# Patient Record
Sex: MALE | Race: WHITE | NOT HISPANIC OR LATINO | Employment: FULL TIME | URBAN - METROPOLITAN AREA
[De-identification: names, ages, dates, MRNs, and addresses within clinical notes are randomized per-mention and may not be internally consistent; named-entity substitution may affect disease eponyms.]

---

## 2017-02-23 ENCOUNTER — HOME HEALTH ADMISSION (OUTPATIENT)
Dept: HOME HEALTH SERVICES | Facility: HOME HEALTHCARE | Age: 47
End: 2017-02-23
Payer: MEDICAID

## 2017-02-24 ENCOUNTER — HOME CARE VISIT (OUTPATIENT)
Dept: HOME HEALTH SERVICES | Facility: HOME HEALTHCARE | Age: 47
End: 2017-02-24

## 2017-02-26 ENCOUNTER — HOME CARE VISIT (OUTPATIENT)
Dept: HOME HEALTH SERVICES | Facility: HOME HEALTHCARE | Age: 47
End: 2017-02-26

## 2018-03-26 ENCOUNTER — APPOINTMENT (OUTPATIENT)
Dept: RADIOLOGY | Facility: MEDICAL CENTER | Age: 48
End: 2018-03-26
Attending: EMERGENCY MEDICINE
Payer: MEDICAID

## 2018-03-26 ENCOUNTER — HOSPITAL ENCOUNTER (EMERGENCY)
Facility: MEDICAL CENTER | Age: 48
End: 2018-03-26
Attending: EMERGENCY MEDICINE
Payer: MEDICAID

## 2018-03-26 VITALS
RESPIRATION RATE: 18 BRPM | SYSTOLIC BLOOD PRESSURE: 131 MMHG | HEIGHT: 73 IN | OXYGEN SATURATION: 96 % | HEART RATE: 83 BPM | WEIGHT: 183.2 LBS | BODY MASS INDEX: 24.28 KG/M2 | DIASTOLIC BLOOD PRESSURE: 81 MMHG | TEMPERATURE: 97.3 F

## 2018-03-26 DIAGNOSIS — M62.830 MUSCLE SPASM OF BACK: ICD-10-CM

## 2018-03-26 LAB
ALBUMIN SERPL BCP-MCNC: 3.8 G/DL (ref 3.2–4.9)
ALBUMIN/GLOB SERPL: 1.4 G/DL
ALP SERPL-CCNC: 120 U/L (ref 30–99)
ALT SERPL-CCNC: 27 U/L (ref 2–50)
ANION GAP SERPL CALC-SCNC: 6 MMOL/L (ref 0–11.9)
AST SERPL-CCNC: 16 U/L (ref 12–45)
BASOPHILS # BLD AUTO: 0.7 % (ref 0–1.8)
BASOPHILS # BLD: 0.03 K/UL (ref 0–0.12)
BILIRUB SERPL-MCNC: 0.5 MG/DL (ref 0.1–1.5)
BUN SERPL-MCNC: 14 MG/DL (ref 8–22)
CALCIUM SERPL-MCNC: 9.4 MG/DL (ref 8.5–10.5)
CHLORIDE SERPL-SCNC: 99 MMOL/L (ref 96–112)
CO2 SERPL-SCNC: 25 MMOL/L (ref 20–33)
CREAT SERPL-MCNC: 0.87 MG/DL (ref 0.5–1.4)
EOSINOPHIL # BLD AUTO: 0.1 K/UL (ref 0–0.51)
EOSINOPHIL NFR BLD: 2.3 % (ref 0–6.9)
ERYTHROCYTE [DISTWIDTH] IN BLOOD BY AUTOMATED COUNT: 37.2 FL (ref 35.9–50)
GLOBULIN SER CALC-MCNC: 2.8 G/DL (ref 1.9–3.5)
GLUCOSE SERPL-MCNC: 448 MG/DL (ref 65–99)
HCT VFR BLD AUTO: 41.8 % (ref 42–52)
HGB BLD-MCNC: 15.3 G/DL (ref 14–18)
IMM GRANULOCYTES # BLD AUTO: 0.01 K/UL (ref 0–0.11)
IMM GRANULOCYTES NFR BLD AUTO: 0.2 % (ref 0–0.9)
LIPASE SERPL-CCNC: 16 U/L (ref 11–82)
LYMPHOCYTES # BLD AUTO: 1.57 K/UL (ref 1–4.8)
LYMPHOCYTES NFR BLD: 36.9 % (ref 22–41)
MCH RBC QN AUTO: 29.1 PG (ref 27–33)
MCHC RBC AUTO-ENTMCNC: 36.6 G/DL (ref 33.7–35.3)
MCV RBC AUTO: 79.6 FL (ref 81.4–97.8)
MONOCYTES # BLD AUTO: 0.52 K/UL (ref 0–0.85)
MONOCYTES NFR BLD AUTO: 12.2 % (ref 0–13.4)
NEUTROPHILS # BLD AUTO: 2.03 K/UL (ref 1.82–7.42)
NEUTROPHILS NFR BLD: 47.7 % (ref 44–72)
NRBC # BLD AUTO: 0 K/UL
NRBC BLD-RTO: 0 /100 WBC
PLATELET # BLD AUTO: 167 K/UL (ref 164–446)
PMV BLD AUTO: 10.1 FL (ref 9–12.9)
POTASSIUM SERPL-SCNC: 4 MMOL/L (ref 3.6–5.5)
PROT SERPL-MCNC: 6.6 G/DL (ref 6–8.2)
RBC # BLD AUTO: 5.25 M/UL (ref 4.7–6.1)
SODIUM SERPL-SCNC: 130 MMOL/L (ref 135–145)
WBC # BLD AUTO: 4.3 K/UL (ref 4.8–10.8)

## 2018-03-26 PROCEDURE — 700111 HCHG RX REV CODE 636 W/ 250 OVERRIDE (IP): Performed by: EMERGENCY MEDICINE

## 2018-03-26 PROCEDURE — 85025 COMPLETE CBC W/AUTO DIFF WBC: CPT

## 2018-03-26 PROCEDURE — 96374 THER/PROPH/DIAG INJ IV PUSH: CPT

## 2018-03-26 PROCEDURE — A9270 NON-COVERED ITEM OR SERVICE: HCPCS | Performed by: EMERGENCY MEDICINE

## 2018-03-26 PROCEDURE — 700102 HCHG RX REV CODE 250 W/ 637 OVERRIDE(OP): Performed by: EMERGENCY MEDICINE

## 2018-03-26 PROCEDURE — 74176 CT ABD & PELVIS W/O CONTRAST: CPT

## 2018-03-26 PROCEDURE — 99285 EMERGENCY DEPT VISIT HI MDM: CPT

## 2018-03-26 PROCEDURE — 83690 ASSAY OF LIPASE: CPT

## 2018-03-26 PROCEDURE — 80053 COMPREHEN METABOLIC PANEL: CPT

## 2018-03-26 RX ORDER — DIAZEPAM 5 MG/1
10 TABLET ORAL ONCE
Status: COMPLETED | OUTPATIENT
Start: 2018-03-26 | End: 2018-03-26

## 2018-03-26 RX ORDER — NAPROXEN 500 MG/1
500 TABLET ORAL 2 TIMES DAILY WITH MEALS
Qty: 20 TAB | Refills: 0 | Status: SHIPPED | OUTPATIENT
Start: 2018-03-26 | End: 2020-02-03

## 2018-03-26 RX ORDER — KETOROLAC TROMETHAMINE 30 MG/ML
30 INJECTION, SOLUTION INTRAMUSCULAR; INTRAVENOUS ONCE
Status: COMPLETED | OUTPATIENT
Start: 2018-03-26 | End: 2018-03-26

## 2018-03-26 RX ORDER — METHOCARBAMOL 750 MG/1
1500 TABLET, FILM COATED ORAL 3 TIMES DAILY
Qty: 30 TAB | Refills: 0 | Status: SHIPPED | OUTPATIENT
Start: 2018-03-26 | End: 2020-03-09

## 2018-03-26 RX ADMIN — DIAZEPAM 10 MG: 5 TABLET ORAL at 03:49

## 2018-03-26 RX ADMIN — KETOROLAC TROMETHAMINE 30 MG: 30 INJECTION, SOLUTION INTRAMUSCULAR; INTRAVENOUS at 03:49

## 2018-03-26 ASSESSMENT — PAIN SCALES - GENERAL
PAINLEVEL_OUTOF10: 0
PAINLEVEL_OUTOF10: 7
PAINLEVEL_OUTOF10: 0

## 2018-03-26 NOTE — ED NOTES
Patient continues resting in bed with eyes closed, visible rise and fall of chest, awakens to touch, oriented x 4, able to stay awake and answer multiple questions, again instructed not to drive home, personal cell phone within reach to call for transportation home, patient immediately falls back asleep as soon as RN left room, VS remain stable on RA.

## 2018-03-26 NOTE — ED NOTES
Pt in nad, resp equal and unlabored. Pt resting in bed with eyes closed. Awaiting ct results. Will continue to monitor.

## 2018-03-26 NOTE — ED PROVIDER NOTES
ED Provider Note    ED Provider Note    Primary care provider: Miladys Sanchez P.A.-C.  Means of arrival: walk in  History obtained from: Patient    CHIEF COMPLAINT  Chief Complaint   Patient presents with   • Flank Pain   • Abdominal Pain     Seen at 3:31 AM.   HPI  Vincent Riojas is a 47 y.o. male who presents to the Emergency Department 1.5 hours of right lower back cramping and spasming type pain. It is nonradiating and occurs without any precipitating or modifying factors. He has never had any pain in this location in the past. He does note a distant history of nephrolithiasis, this resolved without any treatment. He states the pain is very superficial and feels like it is in the skin. He denies any burning or rash.    He denies any recent fevers, chills, nausea, vomiting, hematuria, dysuria, trauma, lifting injury, numbness, weakness or incontinence. He does report some urinary retention that has been persistent for the past year. He has not seen any physician regarding this.    REVIEW OF SYSTEMS  See HPI,   Remainder of ROS negative.     PAST MEDICAL HISTORY   has a past medical history of AIDS (acquired immune deficiency syndrome) (CMS-[facility]); Brain tumor (CMS-HCC); CAD (coronary artery disease); Cancer (CMS-[facility]); Depression; Diabetes mellitus; HIV (human immunodeficiency virus infection) (CMS-[facility]); Lymphoma (CMS-HCC); and Myocardial infarct.    SURGICAL HISTORY  patient denies any surgical history    SOCIAL HISTORY  Social History   Substance Use Topics   • Smoking status: Current Every Day Smoker     Packs/day: 0.50     Types: Cigarettes     Last attempt to quit: 8/14/2015   • Smokeless tobacco: Never Used   • Alcohol use No      Comment: socially      History   Drug Use No     Comment: hx of. currently clean       FAMILY HISTORY  History reviewed. No pertinent family history.    CURRENT MEDICATIONS  Reviewed.  See Encounter Summary.     ALLERGIES  Allergies   Allergen Reactions   •  "Hydrocodone-Acetaminophen Vomiting   • Peanut-Derived Anaphylaxis   • Penicillins Hives     Patient tolerated Ancef 08/13/15       PHYSICAL EXAM  VITAL SIGNS: /91   Pulse 100   Temp 36.3 °C (97.4 °F)   Resp 18   Ht 1.854 m (6' 1\")   Wt 83.1 kg (183 lb 3.2 oz)   SpO2 97%   BMI 24.17 kg/m²   Constitutional: Awake, alert in no apparent distress. Appears uncomfortable intermittently with spasming type pain.  HENT: Normocephalic, Bilateral external ears normal. Nose normal.   Eyes: Conjunctiva normal, non-icteric, EOMI.    Thorax & Lungs: Easy unlabored respirations, Clear to ascultation bilaterally.  Cardiovascular: Borderline tachycardia, No murmurs, rubs or gallops.  Abdomen:  Soft, nontender, nondistended, normal active bowel sounds.   :    Skin: Visualized skin is  Dry, No erythema, No rash.   Musculoskeletal:   No cyanosis, clubbing or edema. The back is grossly normal in appearance. There is some tenderness in the right lower back region at the level of L4 and quite lateral, near the posterior axillary line.  Neurologic: Alert, Grossly non-focal.   Psychiatric: Normal affect, Normal mood  Lymphatic:  No cervical LAD      RADIOLOGY  CT-RENAL COLIC EVALUATION(A/P W/O)   Final Result         1. No urinary tract calculus identified. No renal collecting system in dilatation.      2. No evidence of inflammatory change in the abdomen or pelvis.  The study is however limited due to nonuse of intravenous contrast        The radiologist's interpretation of all radiological studies have been reviewed by me.    COURSE & MEDICAL DECISION MAKING  Pertinent Labs & Imaging studies reviewed. (See chart for details)    Differential diagnoses include but are not limited to: Back spasms, renal colic    3:31 AM - Medical record reviewed, patient seen and examined at bedside. I evaluated this patient on 2 separate occasions in the past.    4:56 AM - I attempted to awaken and discharge patient, he does arouse to verbal " stimuli though he is fairly sedated from the Valium.    Decision Making:  This is a 47 y.o. year old male who presents with severe spasming pain in the right flank and right lower back. His abdominal examination is benign. He does not have any CVA tenderness. His presentation is similar to a time I saw him several years ago when he had severe leg spasms. There was no etiology found at that time with the exception of mild hypokalemia. He was treated with Valium and his symptoms resolved. Because of the location, renal colic was a consideration. CT is unremarkable. Labs and urinalysis are also unremarkable.    I wrote for 10 mg of by mouth Valium on this presentation as when I saw him in the past it took a significant amount of benzodiazepines to achieve a level of comfort. He does appear that he has been slightly overmedicated today and is quite somnolent from the benzodiazepines. His vitals are reassuring, he does not have any hypotension, he does not have any respiratory depression.    He'll be discharged when he awakens fully. Because of his oversedation with benzodiazepines, he will be treated with non-scheduled medications.        The patient's blood pressure is elevated today. >120/80. I have referred them to primary care for follow up.       Discharge Medications:  New Prescriptions    METHOCARBAMOL (ROBAXIN) 750 MG TAB    Take 2 Tabs by mouth 3 times a day.    NAPROXEN (NAPROSYN) 500 MG TAB    Take 1 Tab by mouth 2 times a day, with meals.       The patient will be discharged home in good condition.    FINAL IMPRESSION  1. Muscle spasm of back

## 2018-03-26 NOTE — ED NOTES
Pt remains drowsy, responds to tactile stimuli, but unable to maintain awakeness. Will d/c when more awake.

## 2018-03-26 NOTE — ED NOTES
Pt up in room, removing lines and cords, states he is leaving and has to go to work, pt states he plans to drive, pt informed that he should not drive after taking valium. Pt yelling at staff, asked to wait in room, pt leaving, security called, primary RN and supervisor walking out of ER with pt.

## 2018-03-26 NOTE — ED NOTES
Report from MILAN Kohli.  Patient lethargic, awakens to touch, but then quickly falls back to sleep, placed on continuous pulse oximetry monitoring and automatic BP, POC discussed, instructed patient not to drive home.

## 2018-03-26 NOTE — ED NOTES
Patient ambulatory out of ED with steady gait.  Left prior to being given discharge instructions, follow up information, or prescriptions, patient stated he was going to drive, followed to parking garage, unable to see what kind of vehicle patient was driving, [employer] called and notified.

## 2018-03-26 NOTE — ED NOTES
Pt c/o intermittent R flank, RLQ abd pain x several hours. Pt reports hx of kidney stone last year. Pt reports urinary symptoms. denies n/v/d. Pt also reports hx of AIDS, not currently taking antivirals on a regular basis.

## 2018-03-26 NOTE — ED TRIAGE NOTES
"Chief Complaint   Patient presents with   • Flank Pain   • Abdominal Pain     Patient ambulatory to triage. States that he is having RIGHT sided flank/abdominal pain. States that the pain is \"stabbing\" and has lasted 1.5 hours. Patient states that nothing makes the pain better or worse. /91   Pulse 100   Temp 36.3 °C (97.4 °F)   Resp 18   Ht 1.854 m (6' 1\")   Wt 83.1 kg (183 lb 3.2 oz)   SpO2 97%   BMI 24.17 kg/m²     "

## 2018-03-26 NOTE — DISCHARGE INSTRUCTIONS
Muscle Cramps and Spasms  Muscle cramps and spasms are when muscles tighten by themselves. They usually get better within minutes. Muscle cramps are painful. They are usually stronger and last longer than muscle spasms. Muscle spasms may or may not be painful. They can last a few seconds or much longer.  HOME CARE  · Drink enough fluid to keep your pee (urine) clear or pale yellow.  · Massage, stretch, and relax the muscle.  · Use a warm towel, heating pad, or warm shower water on tight muscles.  · Place ice on the muscle if it is tender or in pain.  ¨ Put ice in a plastic bag.  ¨ Place a towel between your skin and the bag.  ¨ Leave the ice on for 15-20 minutes, 3-4 times a day.  · Only take medicine as told by your doctor.  GET HELP RIGHT AWAY IF:   Your cramps or spasms get worse, happen more often, or do not get better with time.  MAKE SURE YOU:  · Understand these instructions.  · Will watch your condition.  · Will get help right away if you are not doing well or get worse.  This information is not intended to replace advice given to you by your health care provider. Make sure you discuss any questions you have with your health care provider.  Document Released: 11/30/2009 Document Revised: 04/14/2014 Document Reviewed: 09/20/2016  Elsevier Interactive Patient Education © 2017 Elsevier Inc.

## 2020-01-01 ENCOUNTER — HOSPITAL ENCOUNTER (INPATIENT)
Facility: MEDICAL CENTER | Age: 50
LOS: 2 days | DRG: 439 | End: 2020-11-28
Attending: EMERGENCY MEDICINE | Admitting: INTERNAL MEDICINE
Payer: MEDICAID

## 2020-01-01 ENCOUNTER — APPOINTMENT (OUTPATIENT)
Dept: RADIOLOGY | Facility: MEDICAL CENTER | Age: 50
DRG: 439 | End: 2020-01-01
Attending: INTERNAL MEDICINE
Payer: MEDICAID

## 2020-01-01 VITALS
RESPIRATION RATE: 16 BRPM | DIASTOLIC BLOOD PRESSURE: 77 MMHG | HEIGHT: 73 IN | BODY MASS INDEX: 22.53 KG/M2 | SYSTOLIC BLOOD PRESSURE: 120 MMHG | OXYGEN SATURATION: 95 % | HEART RATE: 78 BPM | TEMPERATURE: 98.2 F | WEIGHT: 170 LBS

## 2020-01-01 DIAGNOSIS — Z21 ASYMPTOMATIC HIV INFECTION (HCC): ICD-10-CM

## 2020-01-01 DIAGNOSIS — K85.30 DRUG-INDUCED ACUTE PANCREATITIS, UNSPECIFIED COMPLICATION STATUS: ICD-10-CM

## 2020-01-01 LAB
ALBUMIN SERPL BCP-MCNC: 3.7 G/DL (ref 3.2–4.9)
ALBUMIN/GLOB SERPL: 1.2 G/DL
ALP SERPL-CCNC: 176 U/L (ref 30–99)
ALT SERPL-CCNC: 43 U/L (ref 2–50)
AMPHET UR QL SCN: POSITIVE
AMYLASE SERPL-CCNC: 52 U/L (ref 20–103)
ANION GAP SERPL CALC-SCNC: 4 MMOL/L (ref 7–16)
ANION GAP SERPL CALC-SCNC: 6 MMOL/L (ref 7–16)
ANION GAP SERPL CALC-SCNC: 6 MMOL/L (ref 7–16)
APPEARANCE UR: CLEAR
AST SERPL-CCNC: 20 U/L (ref 12–45)
BARBITURATES UR QL SCN: NEGATIVE
BASOPHILS # BLD AUTO: 0.2 % (ref 0–1.8)
BASOPHILS # BLD AUTO: 0.6 % (ref 0–1.8)
BASOPHILS # BLD: 0.01 K/UL (ref 0–0.12)
BASOPHILS # BLD: 0.02 K/UL (ref 0–0.12)
BENZODIAZ UR QL SCN: NEGATIVE
BILIRUB SERPL-MCNC: 0.5 MG/DL (ref 0.1–1.5)
BILIRUB UR QL STRIP.AUTO: NEGATIVE
BUN SERPL-MCNC: 10 MG/DL (ref 8–22)
BUN SERPL-MCNC: 13 MG/DL (ref 8–22)
BUN SERPL-MCNC: 16 MG/DL (ref 8–22)
BZE UR QL SCN: NEGATIVE
CALCIUM SERPL-MCNC: 8.3 MG/DL (ref 8.5–10.5)
CALCIUM SERPL-MCNC: 8.5 MG/DL (ref 8.5–10.5)
CALCIUM SERPL-MCNC: 9.3 MG/DL (ref 8.5–10.5)
CANNABINOIDS UR QL SCN: NEGATIVE
CHLORIDE SERPL-SCNC: 100 MMOL/L (ref 96–112)
CHLORIDE SERPL-SCNC: 95 MMOL/L (ref 96–112)
CHLORIDE SERPL-SCNC: 95 MMOL/L (ref 96–112)
CHOLEST SERPL-MCNC: 150 MG/DL (ref 100–199)
CO2 SERPL-SCNC: 25 MMOL/L (ref 20–33)
CO2 SERPL-SCNC: 26 MMOL/L (ref 20–33)
CO2 SERPL-SCNC: 28 MMOL/L (ref 20–33)
COLOR UR: ABNORMAL
COVID ORDER STATUS COVID19: NORMAL
CREAT SERPL-MCNC: 0.61 MG/DL (ref 0.5–1.4)
CREAT SERPL-MCNC: 0.64 MG/DL (ref 0.5–1.4)
CREAT SERPL-MCNC: 0.81 MG/DL (ref 0.5–1.4)
EOSINOPHIL # BLD AUTO: 0.11 K/UL (ref 0–0.51)
EOSINOPHIL # BLD AUTO: 0.15 K/UL (ref 0–0.51)
EOSINOPHIL NFR BLD: 2.6 % (ref 0–6.9)
EOSINOPHIL NFR BLD: 4.2 % (ref 0–6.9)
ERYTHROCYTE [DISTWIDTH] IN BLOOD BY AUTOMATED COUNT: 37.2 FL (ref 35.9–50)
ERYTHROCYTE [DISTWIDTH] IN BLOOD BY AUTOMATED COUNT: 38.5 FL (ref 35.9–50)
ERYTHROCYTE [DISTWIDTH] IN BLOOD BY AUTOMATED COUNT: 40.5 FL (ref 35.9–50)
ETHANOL BLD-MCNC: <10.1 MG/DL (ref 0–10)
GLOBULIN SER CALC-MCNC: 3 G/DL (ref 1.9–3.5)
GLUCOSE BLD-MCNC: 116 MG/DL (ref 65–99)
GLUCOSE BLD-MCNC: 153 MG/DL (ref 65–99)
GLUCOSE BLD-MCNC: 168 MG/DL (ref 65–99)
GLUCOSE BLD-MCNC: 171 MG/DL (ref 65–99)
GLUCOSE BLD-MCNC: 244 MG/DL (ref 65–99)
GLUCOSE BLD-MCNC: 250 MG/DL (ref 65–99)
GLUCOSE SERPL-MCNC: 136 MG/DL (ref 65–99)
GLUCOSE SERPL-MCNC: 168 MG/DL (ref 65–99)
GLUCOSE SERPL-MCNC: 334 MG/DL (ref 65–99)
GLUCOSE UR STRIP.AUTO-MCNC: >=1000 MG/DL
HAV IGM SERPL QL IA: ABNORMAL
HBV CORE IGM SER QL: REACTIVE
HBV SURFACE AG SER QL: REACTIVE
HCT VFR BLD AUTO: 39.9 % (ref 42–52)
HCT VFR BLD AUTO: 40.6 % (ref 42–52)
HCT VFR BLD AUTO: 46 % (ref 42–52)
HCV AB SER QL: ABNORMAL
HDLC SERPL-MCNC: 26 MG/DL
HGB BLD-MCNC: 14.1 G/DL (ref 14–18)
HGB BLD-MCNC: 14.1 G/DL (ref 14–18)
HGB BLD-MCNC: 16.3 G/DL (ref 14–18)
IMM GRANULOCYTES # BLD AUTO: 0.01 K/UL (ref 0–0.11)
IMM GRANULOCYTES # BLD AUTO: 0.01 K/UL (ref 0–0.11)
IMM GRANULOCYTES NFR BLD AUTO: 0.2 % (ref 0–0.9)
IMM GRANULOCYTES NFR BLD AUTO: 0.3 % (ref 0–0.9)
KETONES UR STRIP.AUTO-MCNC: NEGATIVE MG/DL
LDLC SERPL CALC-MCNC: 104 MG/DL
LEUKOCYTE ESTERASE UR QL STRIP.AUTO: NEGATIVE
LIPASE SERPL-CCNC: 171 U/L (ref 11–82)
LYMPHOCYTES # BLD AUTO: 1.77 K/UL (ref 1–4.8)
LYMPHOCYTES # BLD AUTO: 1.79 K/UL (ref 1–4.8)
LYMPHOCYTES NFR BLD: 42.3 % (ref 22–41)
LYMPHOCYTES NFR BLD: 50.1 % (ref 22–41)
MAGNESIUM SERPL-MCNC: 1.6 MG/DL (ref 1.5–2.5)
MCH RBC QN AUTO: 29.1 PG (ref 27–33)
MCH RBC QN AUTO: 29.2 PG (ref 27–33)
MCH RBC QN AUTO: 29.4 PG (ref 27–33)
MCHC RBC AUTO-ENTMCNC: 34.7 G/DL (ref 33.7–35.3)
MCHC RBC AUTO-ENTMCNC: 35.3 G/DL (ref 33.7–35.3)
MCHC RBC AUTO-ENTMCNC: 35.4 G/DL (ref 33.7–35.3)
MCV RBC AUTO: 82.3 FL (ref 81.4–97.8)
MCV RBC AUTO: 82.4 FL (ref 81.4–97.8)
MCV RBC AUTO: 84.6 FL (ref 81.4–97.8)
METHADONE UR QL SCN: NEGATIVE
MICRO URNS: ABNORMAL
MONOCYTES # BLD AUTO: 0.49 K/UL (ref 0–0.85)
MONOCYTES # BLD AUTO: 0.52 K/UL (ref 0–0.85)
MONOCYTES NFR BLD AUTO: 12.3 % (ref 0–13.4)
MONOCYTES NFR BLD AUTO: 13.9 % (ref 0–13.4)
NEUTROPHILS # BLD AUTO: 1.09 K/UL (ref 1.82–7.42)
NEUTROPHILS # BLD AUTO: 1.79 K/UL (ref 1.82–7.42)
NEUTROPHILS NFR BLD: 30.9 % (ref 44–72)
NEUTROPHILS NFR BLD: 42.4 % (ref 44–72)
NITRITE UR QL STRIP.AUTO: NEGATIVE
NRBC # BLD AUTO: 0 K/UL
NRBC # BLD AUTO: 0 K/UL
NRBC BLD-RTO: 0 /100 WBC
NRBC BLD-RTO: 0 /100 WBC
OPIATES UR QL SCN: POSITIVE
OXYCODONE UR QL SCN: NEGATIVE
PCP UR QL SCN: NEGATIVE
PH UR STRIP.AUTO: 6 [PH] (ref 5–8)
PHOSPHATE SERPL-MCNC: 2.8 MG/DL (ref 2.5–4.5)
PLATELET # BLD AUTO: 111 K/UL (ref 164–446)
PLATELET # BLD AUTO: 112 K/UL (ref 164–446)
PLATELET # BLD AUTO: 142 K/UL (ref 164–446)
PMV BLD AUTO: 10.1 FL (ref 9–12.9)
PMV BLD AUTO: 9.8 FL (ref 9–12.9)
PMV BLD AUTO: 9.8 FL (ref 9–12.9)
POTASSIUM SERPL-SCNC: 4 MMOL/L (ref 3.6–5.5)
POTASSIUM SERPL-SCNC: 4 MMOL/L (ref 3.6–5.5)
POTASSIUM SERPL-SCNC: 4.4 MMOL/L (ref 3.6–5.5)
PROPOXYPH UR QL SCN: NEGATIVE
PROT SERPL-MCNC: 6.7 G/DL (ref 6–8.2)
PROT UR QL STRIP: NEGATIVE MG/DL
RBC # BLD AUTO: 4.8 M/UL (ref 4.7–6.1)
RBC # BLD AUTO: 4.85 M/UL (ref 4.7–6.1)
RBC # BLD AUTO: 5.58 M/UL (ref 4.7–6.1)
RBC UR QL AUTO: NEGATIVE
SARS-COV-2 RNA RESP QL NAA+PROBE: NOTDETECTED
SODIUM SERPL-SCNC: 127 MMOL/L (ref 135–145)
SODIUM SERPL-SCNC: 129 MMOL/L (ref 135–145)
SODIUM SERPL-SCNC: 129 MMOL/L (ref 135–145)
SP GR UR STRIP.AUTO: 1.04
SPECIMEN SOURCE: NORMAL
TRIGL SERPL-MCNC: 99 MG/DL (ref 0–149)
UROBILINOGEN UR STRIP.AUTO-MCNC: 1 MG/DL
WBC # BLD AUTO: 3.5 K/UL (ref 4.8–10.8)
WBC # BLD AUTO: 3.5 K/UL (ref 4.8–10.8)
WBC # BLD AUTO: 4.2 K/UL (ref 4.8–10.8)

## 2020-01-01 PROCEDURE — A9270 NON-COVERED ITEM OR SERVICE: HCPCS | Performed by: INTERNAL MEDICINE

## 2020-01-01 PROCEDURE — 80048 BASIC METABOLIC PNL TOTAL CA: CPT

## 2020-01-01 PROCEDURE — 82150 ASSAY OF AMYLASE: CPT

## 2020-01-01 PROCEDURE — U0003 INFECTIOUS AGENT DETECTION BY NUCLEIC ACID (DNA OR RNA); SEVERE ACUTE RESPIRATORY SYNDROME CORONAVIRUS 2 (SARS-COV-2) (CORONAVIRUS DISEASE [COVID-19]), AMPLIFIED PROBE TECHNIQUE, MAKING USE OF HIGH THROUGHPUT TECHNOLOGIES AS DESCRIBED BY CMS-2020-01-R: HCPCS

## 2020-01-01 PROCEDURE — 700101 HCHG RX REV CODE 250: Performed by: INTERNAL MEDICINE

## 2020-01-01 PROCEDURE — 700102 HCHG RX REV CODE 250 W/ 637 OVERRIDE(OP): Performed by: INTERNAL MEDICINE

## 2020-01-01 PROCEDURE — 83735 ASSAY OF MAGNESIUM: CPT

## 2020-01-01 PROCEDURE — 770001 HCHG ROOM/CARE - MED/SURG/GYN PRIV*

## 2020-01-01 PROCEDURE — 700111 HCHG RX REV CODE 636 W/ 250 OVERRIDE (IP): Performed by: EMERGENCY MEDICINE

## 2020-01-01 PROCEDURE — 99285 EMERGENCY DEPT VISIT HI MDM: CPT

## 2020-01-01 PROCEDURE — 700111 HCHG RX REV CODE 636 W/ 250 OVERRIDE (IP)

## 2020-01-01 PROCEDURE — 85027 COMPLETE CBC AUTOMATED: CPT

## 2020-01-01 PROCEDURE — 76705 ECHO EXAM OF ABDOMEN: CPT

## 2020-01-01 PROCEDURE — 85025 COMPLETE CBC W/AUTO DIFF WBC: CPT

## 2020-01-01 PROCEDURE — 80307 DRUG TEST PRSMV CHEM ANLYZR: CPT

## 2020-01-01 PROCEDURE — 82962 GLUCOSE BLOOD TEST: CPT | Mod: 91

## 2020-01-01 PROCEDURE — 96374 THER/PROPH/DIAG INJ IV PUSH: CPT

## 2020-01-01 PROCEDURE — 80061 LIPID PANEL: CPT

## 2020-01-01 PROCEDURE — A9270 NON-COVERED ITEM OR SERVICE: HCPCS

## 2020-01-01 PROCEDURE — 700102 HCHG RX REV CODE 250 W/ 637 OVERRIDE(OP)

## 2020-01-01 PROCEDURE — 36415 COLL VENOUS BLD VENIPUNCTURE: CPT

## 2020-01-01 PROCEDURE — 82962 GLUCOSE BLOOD TEST: CPT

## 2020-01-01 PROCEDURE — 700105 HCHG RX REV CODE 258: Performed by: EMERGENCY MEDICINE

## 2020-01-01 PROCEDURE — 80074 ACUTE HEPATITIS PANEL: CPT

## 2020-01-01 PROCEDURE — 99232 SBSQ HOSP IP/OBS MODERATE 35: CPT | Performed by: INTERNAL MEDICINE

## 2020-01-01 PROCEDURE — 83690 ASSAY OF LIPASE: CPT

## 2020-01-01 PROCEDURE — 80053 COMPREHEN METABOLIC PANEL: CPT

## 2020-01-01 PROCEDURE — 700111 HCHG RX REV CODE 636 W/ 250 OVERRIDE (IP): Performed by: INTERNAL MEDICINE

## 2020-01-01 PROCEDURE — 99223 1ST HOSP IP/OBS HIGH 75: CPT | Performed by: INTERNAL MEDICINE

## 2020-01-01 PROCEDURE — 81003 URINALYSIS AUTO W/O SCOPE: CPT

## 2020-01-01 PROCEDURE — 700105 HCHG RX REV CODE 258: Performed by: INTERNAL MEDICINE

## 2020-01-01 PROCEDURE — 96375 TX/PRO/DX INJ NEW DRUG ADDON: CPT

## 2020-01-01 PROCEDURE — 84100 ASSAY OF PHOSPHORUS: CPT

## 2020-01-01 PROCEDURE — 770006 HCHG ROOM/CARE - MED/SURG/GYN SEMI*

## 2020-01-01 PROCEDURE — 96376 TX/PRO/DX INJ SAME DRUG ADON: CPT

## 2020-01-01 RX ORDER — SODIUM CHLORIDE 9 MG/ML
1000 INJECTION, SOLUTION INTRAVENOUS ONCE
Status: COMPLETED | OUTPATIENT
Start: 2020-01-01 | End: 2020-01-01

## 2020-01-01 RX ORDER — BICTEGRAVIR SODIUM, EMTRICITABINE, AND TENOFOVIR ALAFENAMIDE FUMARATE 50; 200; 25 MG/1; MG/1; MG/1
1 TABLET ORAL DAILY
COMMUNITY
End: 2021-01-01

## 2020-01-01 RX ORDER — FAMOTIDINE 20 MG/1
20 TABLET, FILM COATED ORAL 2 TIMES DAILY
Status: DISCONTINUED | OUTPATIENT
Start: 2020-01-01 | End: 2020-01-01 | Stop reason: HOSPADM

## 2020-01-01 RX ORDER — OXYCODONE HYDROCHLORIDE 5 MG/1
TABLET ORAL
Status: COMPLETED
Start: 2020-01-01 | End: 2020-01-01

## 2020-01-01 RX ORDER — PROCHLORPERAZINE EDISYLATE 5 MG/ML
5-10 INJECTION INTRAMUSCULAR; INTRAVENOUS EVERY 4 HOURS PRN
Status: DISCONTINUED | OUTPATIENT
Start: 2020-01-01 | End: 2020-01-01 | Stop reason: HOSPADM

## 2020-01-01 RX ORDER — ABACAVIR SULFATE, DOLUTEGRAVIR SODIUM, LAMIVUDINE 600; 50; 300 MG/1; MG/1; MG/1
1 TABLET, FILM COATED ORAL DAILY
COMMUNITY
End: 2020-01-01

## 2020-01-01 RX ORDER — MORPHINE SULFATE 4 MG/ML
INJECTION, SOLUTION INTRAMUSCULAR; INTRAVENOUS
Status: COMPLETED
Start: 2020-01-01 | End: 2020-01-01

## 2020-01-01 RX ORDER — ONDANSETRON 2 MG/ML
4 INJECTION INTRAMUSCULAR; INTRAVENOUS
Status: DISCONTINUED | OUTPATIENT
Start: 2020-01-01 | End: 2020-01-01

## 2020-01-01 RX ORDER — PROMETHAZINE HYDROCHLORIDE 25 MG/1
12.5-25 TABLET ORAL EVERY 4 HOURS PRN
Status: DISCONTINUED | OUTPATIENT
Start: 2020-01-01 | End: 2020-01-01 | Stop reason: HOSPADM

## 2020-01-01 RX ORDER — SODIUM CHLORIDE, SODIUM LACTATE, POTASSIUM CHLORIDE, CALCIUM CHLORIDE 600; 310; 30; 20 MG/100ML; MG/100ML; MG/100ML; MG/100ML
INJECTION, SOLUTION INTRAVENOUS CONTINUOUS
Status: DISCONTINUED | OUTPATIENT
Start: 2020-01-01 | End: 2020-01-01

## 2020-01-01 RX ORDER — OXYCODONE HYDROCHLORIDE 5 MG/1
TABLET ORAL
Status: DISPENSED
Start: 2020-01-01 | End: 2020-01-01

## 2020-01-01 RX ORDER — PROMETHAZINE HYDROCHLORIDE 12.5 MG/1
12.5-25 SUPPOSITORY RECTAL EVERY 4 HOURS PRN
Status: DISCONTINUED | OUTPATIENT
Start: 2020-01-01 | End: 2020-01-01 | Stop reason: HOSPADM

## 2020-01-01 RX ORDER — DEXTROSE MONOHYDRATE 25 G/50ML
50 INJECTION, SOLUTION INTRAVENOUS
Status: DISCONTINUED | OUTPATIENT
Start: 2020-01-01 | End: 2020-01-01 | Stop reason: HOSPADM

## 2020-01-01 RX ORDER — ACETAMINOPHEN 325 MG/1
650 TABLET ORAL EVERY 6 HOURS PRN
Status: DISCONTINUED | OUTPATIENT
Start: 2020-01-01 | End: 2020-01-01 | Stop reason: HOSPADM

## 2020-01-01 RX ORDER — ONDANSETRON 2 MG/ML
4 INJECTION INTRAMUSCULAR; INTRAVENOUS EVERY 4 HOURS PRN
Status: DISCONTINUED | OUTPATIENT
Start: 2020-01-01 | End: 2020-01-01 | Stop reason: HOSPADM

## 2020-01-01 RX ORDER — ONDANSETRON 4 MG/1
TABLET, ORALLY DISINTEGRATING ORAL
Status: COMPLETED
Start: 2020-01-01 | End: 2020-01-01

## 2020-01-01 RX ORDER — OXYCODONE HYDROCHLORIDE 5 MG/1
5 TABLET ORAL
Status: DISCONTINUED | OUTPATIENT
Start: 2020-01-01 | End: 2020-01-01 | Stop reason: HOSPADM

## 2020-01-01 RX ORDER — ONDANSETRON 4 MG/1
4 TABLET, ORALLY DISINTEGRATING ORAL EVERY 4 HOURS PRN
Status: DISCONTINUED | OUTPATIENT
Start: 2020-01-01 | End: 2020-01-01 | Stop reason: HOSPADM

## 2020-01-01 RX ORDER — MORPHINE SULFATE 4 MG/ML
4 INJECTION, SOLUTION INTRAMUSCULAR; INTRAVENOUS ONCE
Status: COMPLETED | OUTPATIENT
Start: 2020-01-01 | End: 2020-01-01

## 2020-01-01 RX ORDER — POLYETHYLENE GLYCOL 3350 17 G/17G
1 POWDER, FOR SOLUTION ORAL DAILY
Status: DISCONTINUED | OUTPATIENT
Start: 2020-01-01 | End: 2020-01-01 | Stop reason: HOSPADM

## 2020-01-01 RX ORDER — MORPHINE SULFATE 4 MG/ML
4 INJECTION, SOLUTION INTRAMUSCULAR; INTRAVENOUS
Status: DISCONTINUED | OUTPATIENT
Start: 2020-01-01 | End: 2020-01-01 | Stop reason: HOSPADM

## 2020-01-01 RX ORDER — OXYCODONE HYDROCHLORIDE 10 MG/1
10 TABLET ORAL
Status: DISCONTINUED | OUTPATIENT
Start: 2020-01-01 | End: 2020-01-01 | Stop reason: HOSPADM

## 2020-01-01 RX ADMIN — OXYCODONE HYDROCHLORIDE 5 MG: 5 TABLET ORAL at 05:31

## 2020-01-01 RX ADMIN — ONDANSETRON 4 MG: 2 INJECTION INTRAMUSCULAR; INTRAVENOUS at 09:06

## 2020-01-01 RX ADMIN — SODIUM CHLORIDE, POTASSIUM CHLORIDE, SODIUM LACTATE AND CALCIUM CHLORIDE: 600; 310; 30; 20 INJECTION, SOLUTION INTRAVENOUS at 20:41

## 2020-01-01 RX ADMIN — OXYCODONE HYDROCHLORIDE 5 MG: 5 TABLET ORAL at 21:34

## 2020-01-01 RX ADMIN — FAMOTIDINE 20 MG: 20 TABLET ORAL at 04:24

## 2020-01-01 RX ADMIN — MORPHINE SULFATE 4 MG: 4 INJECTION INTRAVENOUS at 12:57

## 2020-01-01 RX ADMIN — INSULIN HUMAN 2 UNITS: 100 INJECTION, SOLUTION PARENTERAL at 17:00

## 2020-01-01 RX ADMIN — FAMOTIDINE 20 MG: 20 TABLET ORAL at 17:15

## 2020-01-01 RX ADMIN — MORPHINE SULFATE 4 MG: 4 INJECTION INTRAVENOUS at 19:54

## 2020-01-01 RX ADMIN — POLYETHYLENE GLYCOL 3350 1 PACKET: 17 POWDER, FOR SOLUTION ORAL at 04:25

## 2020-01-01 RX ADMIN — SODIUM CHLORIDE 1000 ML: 9 INJECTION, SOLUTION INTRAVENOUS at 12:29

## 2020-01-01 RX ADMIN — SODIUM CHLORIDE, POTASSIUM CHLORIDE, SODIUM LACTATE AND CALCIUM CHLORIDE: 600; 310; 30; 20 INJECTION, SOLUTION INTRAVENOUS at 18:24

## 2020-01-01 RX ADMIN — MORPHINE SULFATE 4 MG: 4 INJECTION, SOLUTION INTRAMUSCULAR; INTRAVENOUS at 19:54

## 2020-01-01 RX ADMIN — OXYCODONE HYDROCHLORIDE 5 MG: 5 TABLET ORAL at 12:54

## 2020-01-01 RX ADMIN — ENOXAPARIN SODIUM 40 MG: 40 INJECTION SUBCUTANEOUS at 04:25

## 2020-01-01 RX ADMIN — ENOXAPARIN SODIUM 40 MG: 40 INJECTION SUBCUTANEOUS at 06:05

## 2020-01-01 RX ADMIN — SODIUM CHLORIDE, POTASSIUM CHLORIDE, SODIUM LACTATE AND CALCIUM CHLORIDE: 600; 310; 30; 20 INJECTION, SOLUTION INTRAVENOUS at 02:51

## 2020-01-01 RX ADMIN — INSULIN HUMAN 2 UNITS: 100 INJECTION, SOLUTION PARENTERAL at 12:20

## 2020-01-01 RX ADMIN — POLYETHYLENE GLYCOL 3350 1 PACKET: 17 POWDER, FOR SOLUTION ORAL at 12:20

## 2020-01-01 RX ADMIN — MORPHINE SULFATE 4 MG: 4 INJECTION INTRAVENOUS at 09:06

## 2020-01-01 RX ADMIN — INSULIN HUMAN 1 UNITS: 100 INJECTION, SOLUTION PARENTERAL at 17:14

## 2020-01-01 RX ADMIN — MORPHINE SULFATE 4 MG: 4 INJECTION INTRAVENOUS at 16:54

## 2020-01-01 RX ADMIN — MORPHINE SULFATE 4 MG: 4 INJECTION, SOLUTION INTRAMUSCULAR; INTRAVENOUS at 02:20

## 2020-01-01 RX ADMIN — ONDANSETRON 4 MG: 4 TABLET, ORALLY DISINTEGRATING ORAL at 21:34

## 2020-01-01 RX ADMIN — MORPHINE SULFATE 4 MG: 4 INJECTION, SOLUTION INTRAMUSCULAR; INTRAVENOUS at 16:54

## 2020-01-01 RX ADMIN — OXYCODONE HYDROCHLORIDE 5 MG: 5 TABLET ORAL at 01:13

## 2020-01-01 RX ADMIN — INSULIN HUMAN 1 UNITS: 100 INJECTION, SOLUTION PARENTERAL at 23:47

## 2020-01-01 RX ADMIN — INSULIN HUMAN 1 UNITS: 100 INJECTION, SOLUTION PARENTERAL at 00:10

## 2020-01-01 RX ADMIN — MORPHINE SULFATE 4 MG: 4 INJECTION INTRAVENOUS at 02:20

## 2020-01-01 ASSESSMENT — COPD QUESTIONNAIRES
HAVE YOU SMOKED AT LEAST 100 CIGARETTES IN YOUR ENTIRE LIFE: YES
COPD SCREENING SCORE: 3
DURING THE PAST 4 WEEKS HOW MUCH DID YOU FEEL SHORT OF BREATH: NONE/LITTLE OF THE TIME
DO YOU EVER COUGH UP ANY MUCUS OR PHLEGM?: NO/ONLY WITH OCCASIONAL COLDS OR INFECTIONS

## 2020-01-01 ASSESSMENT — ENCOUNTER SYMPTOMS
SHORTNESS OF BREATH: 0
FOCAL WEAKNESS: 0
CLAUDICATION: 0
MYALGIAS: 0
FLANK PAIN: 0
SENSORY CHANGE: 0
HEMOPTYSIS: 0
BACK PAIN: 0
DIARRHEA: 0
CHILLS: 0
HEADACHES: 0
VOMITING: 0
MYALGIAS: 0
FEVER: 0
BRUISES/BLEEDS EASILY: 0
BLURRED VISION: 0
BRUISES/BLEEDS EASILY: 0
BLOOD IN STOOL: 0
ABDOMINAL PAIN: 1
DOUBLE VISION: 0
WHEEZING: 0
FEVER: 0
COUGH: 0
VOMITING: 1
PALPITATIONS: 0
EYE DISCHARGE: 0
DIZZINESS: 0
CHILLS: 0
SPEECH CHANGE: 0
FLANK PAIN: 0
HEADACHES: 0
DOUBLE VISION: 0
SORE THROAT: 0
SPEECH CHANGE: 0
VOMITING: 0
COUGH: 0
BLOOD IN STOOL: 0
HEARTBURN: 0
NAUSEA: 0
BACK PAIN: 0
FEVER: 0
BACK PAIN: 0
SHORTNESS OF BREATH: 0
SPUTUM PRODUCTION: 0
WEAKNESS: 0
SPUTUM PRODUCTION: 0
PHOTOPHOBIA: 0
NAUSEA: 1
NECK PAIN: 0
FLANK PAIN: 0
MYALGIAS: 0
BLURRED VISION: 0
DEPRESSION: 0
DEPRESSION: 0
DIAPHORESIS: 0
DIZZINESS: 0
BLURRED VISION: 0
BRUISES/BLEEDS EASILY: 0
PALPITATIONS: 0
SEIZURES: 0
PHOTOPHOBIA: 0
SORE THROAT: 0
ABDOMINAL PAIN: 1
CHILLS: 0
NAUSEA: 0
COUGH: 0
HEMOPTYSIS: 0
ABDOMINAL PAIN: 1
DIZZINESS: 0
DIARRHEA: 0
ORTHOPNEA: 0
WHEEZING: 0
EYE DISCHARGE: 0
PALPITATIONS: 0

## 2020-01-01 ASSESSMENT — LIFESTYLE VARIABLES
HAVE YOU EVER FELT YOU SHOULD CUT DOWN ON YOUR DRINKING: NO
AVERAGE NUMBER OF DAYS PER WEEK YOU HAVE A DRINK CONTAINING ALCOHOL: 0
HOW MANY TIMES IN THE PAST YEAR HAVE YOU HAD 5 OR MORE DRINKS IN A DAY: 0
EVER HAD A DRINK FIRST THING IN THE MORNING TO STEADY YOUR NERVES TO GET RID OF A HANGOVER: NO
EVER FELT BAD OR GUILTY ABOUT YOUR DRINKING: NO
DO YOU DRINK ALCOHOL: NO
TOTAL SCORE: 0
CONSUMPTION TOTAL: NEGATIVE
TOTAL SCORE: 0
ON A TYPICAL DAY WHEN YOU DRINK ALCOHOL HOW MANY DRINKS DO YOU HAVE: 0
HAVE PEOPLE ANNOYED YOU BY CRITICIZING YOUR DRINKING: NO
TOTAL SCORE: 0

## 2020-01-01 ASSESSMENT — PAIN DESCRIPTION - PAIN TYPE
TYPE: ACUTE PAIN

## 2020-01-01 ASSESSMENT — PATIENT HEALTH QUESTIONNAIRE - PHQ9
3. TROUBLE FALLING OR STAYING ASLEEP OR SLEEPING TOO MUCH: SEVERAL DAYS
SUM OF ALL RESPONSES TO PHQ9 QUESTIONS 1 AND 2: 2
2. FEELING DOWN, DEPRESSED, IRRITABLE, OR HOPELESS: SEVERAL DAYS
6. FEELING BAD ABOUT YOURSELF - OR THAT YOU ARE A FAILURE OR HAVE LET YOURSELF OR YOUR FAMILY DOWN: SEVERAL DAYS
9. THOUGHTS THAT YOU WOULD BE BETTER OFF DEAD, OR OF HURTING YOURSELF: NOT AT ALL
1. LITTLE INTEREST OR PLEASURE IN DOING THINGS: SEVERAL DAYS
7. TROUBLE CONCENTRATING ON THINGS, SUCH AS READING THE NEWSPAPER OR WATCHING TELEVISION: SEVERAL DAYS
8. MOVING OR SPEAKING SO SLOWLY THAT OTHER PEOPLE COULD HAVE NOTICED. OR THE OPPOSITE, BEING SO FIGETY OR RESTLESS THAT YOU HAVE BEEN MOVING AROUND A LOT MORE THAN USUAL: NOT AT ALL
4. FEELING TIRED OR HAVING LITTLE ENERGY: SEVERAL DAYS
SUM OF ALL RESPONSES TO PHQ QUESTIONS 1-9: 7
5. POOR APPETITE OR OVEREATING: SEVERAL DAYS

## 2020-01-01 ASSESSMENT — FIBROSIS 4 INDEX: FIB4 SCORE: 2.36

## 2020-01-07 ENCOUNTER — OCCUPATIONAL MEDICINE (OUTPATIENT)
Dept: URGENT CARE | Facility: PHYSICIAN GROUP | Age: 50
End: 2020-01-07
Payer: COMMERCIAL

## 2020-01-07 VITALS
SYSTOLIC BLOOD PRESSURE: 122 MMHG | HEART RATE: 93 BPM | WEIGHT: 170 LBS | RESPIRATION RATE: 12 BRPM | DIASTOLIC BLOOD PRESSURE: 72 MMHG | OXYGEN SATURATION: 98 % | BODY MASS INDEX: 22.53 KG/M2 | HEIGHT: 73 IN | TEMPERATURE: 97.3 F

## 2020-01-07 DIAGNOSIS — S86.811A STRAIN OF CALF MUSCLE, RIGHT, INITIAL ENCOUNTER: ICD-10-CM

## 2020-01-07 PROCEDURE — 99203 OFFICE O/P NEW LOW 30 MIN: CPT | Mod: 29 | Performed by: NURSE PRACTITIONER

## 2020-01-07 ASSESSMENT — ENCOUNTER SYMPTOMS
TINGLING: 0
SHORTNESS OF BREATH: 0
FEVER: 0
CHILLS: 0

## 2020-01-07 NOTE — LETTER
"EMPLOYEE’S CLAIM FOR COMPENSATION/ REPORT OF INITIAL TREATMENT  FORM C-4    EMPLOYEE’S CLAIM - PROVIDE ALL INFORMATION REQUESTED   First Name  Vincent Last Name  Shine Birthdate                    1970                Sex  male Claim Number   Home Address  [address] Age  49 y.o. Height  1.854 m (6' 1\") Weight  77.1 kg (170 lb) [employer] [facility]     [facility] Zip  [address] Telephone  There are no phone numbers on file.   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 (if applicable)  Pushing a cart with 3 kits om it     How did this injury or occupational disease occur? (Be specific an answer in detail. Use additional sheet if necessary)  Pushing a cart with 3 kits. Stopped to let robot have right of way, was pushing off to restart my way to delivery point when I felt a sudden pain   If you believe that you have an occupational disease, when did you first have knowledge of the disability and it relationship to your employment?  n/a Witnesses to the Accident  n/a      Nature of Injury or Occupational Disease  Strain  Part(s) of Body Injured or Affected  Lower Leg (R), ,     I certify that the above is true and correct to the best of my knowledge and that I " have provided this information in order to obtain the benefits of [place]  I hereby authorize any physician, chiropractor, surgeon, practitioner, or other person, any hospital, including [facility] hospital, any medical service organization, any insurance company, or other institution or organization to release to each other, any medical or other information, including benefits paid or payable, pertinent to this injury or disease, except information relative to diagnosis, treatment and/or counseling for AIDS, psychological conditions, alcohol or controlled substances, for which I must give specific authorization.  A Photostat of this authorization shall be as valid as the original.     Date   Place   Employee’s Signature   THIS REPORT MUST BE COMPLETED AND MAILED WITHIN 3 WORKING DAYS OF TREATMENT   Place  [facility]  Name of Facility  [place]   Date  1/7/2020 Diagnosis  (S86.811A) Strain of calf muscle, right, initial encounter Is there evidence the injured employee was under the influence of alcohol and/or another controlled substance at the time of accident?   Hour  6:37 PM Description of Injury or Disease  The encounter diagnosis was Strain of calf muscle, right, initial encounter.     Treatment  Continue with ace wrap and May take over-the-counter Ibuprofen 600-800 mg every 8 hours as needed for pain. Return to office for worsening symptoms.  Have you advised the patient to remain off work five days or more?     X-Ray Findings      If Yes   From Date  To Date      From information given by the employee, together with medical evidence, can you directly connect this injury or occupational disease as job incurred?  Yes If No Full Duty No Modified Duty  Yes   Is additional medical care by a physician indicated?  Yes If Modified Duty, Specify any Limitations /  "Restrictions      Do you know of any previous injury or disease contributing to this condition or occupational disease?                            No   Date  1/7/2020 Print Doctor’s Name MARY BETH Reed I certify the employer’s copy of  this form was mailed on:   Address  [address]  [facility] Insurer’s Use Only     [facility]  [id number]    Provider’s Tax ID Number  [id number] Telephone  Dept: [phone]        sin-AMANDA Aguirre   e-Signature: Dr. Sukh Putnam,   Medical Director Degree  MD        ORIGINAL-TREATING PHYSICIAN OR CHIROPRACTOR    PAGE 2-INSURER/TPA    PAGE 3-EMPLOYER    PAGE 4-EMPLOYEE             Form C-4 (rev.10/07)              BRIEF DESCRIPTION OF RIGHTS AND BENEFITS  (Pursuant to [place] 616C.[place])    Notice of Injury or Occupational Disease (Incident Report Form C-1): If an injury or occupational disease (OD) arises out of and in the course of employment, you must provide written notice to your employer as soon as practicable, but no later than 7 days after the accident or OD. Your employer shall maintain a sufficient supply of the required forms.    Claim for Compensation (Form C-4): If medical treatment is sought, the form C-4 is available at the place of initial treatment. A completed \"Claim for Compensation\" (Form C-4) must be filed within 90 days after an accident or OD. The treating physician or chiropractor must, within 3 working days after treatment, complete and mail to the employer, the employer's insurer and third-party , the Claim for Compensation.    Medical Treatment: If you require medical treatment for your on-the-job injury or OD, you may be required to select a physician or chiropractor from a list provided by your workers’ compensation insurer, if it has contracted with an Organization for Managed Care (MCO) or Preferred Provider Organization (PPO) or providers of health care. If your employer has not entered into a contract " with an MCO or PPO, you may select a physician or chiropractor from the Panel of Physicians and Chiropractors. Any medical costs related to your industrial injury or OD will be paid by your insurer.    Temporary Total Disability (TTD): If your doctor has certified that you are unable to work for a period of at least 5 consecutive days, or 5 cumulative days in a 20-day period, or places restrictions on you that your employer does not accommodate, you may be entitled to TTD compensation.    Temporary Partial Disability (TPD): If the wage you receive upon reemployment is less than the compensation for TTD to which you are entitled, the insurer may be required to pay you TPD compensation to make up the difference. TPD can only be paid for a maximum of 24 months.    Permanent Partial Disability (PPD): When your medical condition is stable and there is an indication of a PPD as a result of your injury or OD, within 30 days, your insurer must arrange for an evaluation by a rating physician or chiropractor to determine the degree of your PPD. The amount of your PPD award depends on the date of injury, the results of the PPD evaluation and your age and wage.    Permanent Total Disability (PTD): If you are medically certified by a treating physician or chiropractor as permanently and totally disabled and have been granted a PTD status by your insurer, you are entitled to receive monthly benefits not to exceed 66 2/3% of your average monthly wage. The amount of your PTD payments is subject to reduction if you previously received a PPD award.    Vocational Rehabilitation Services: You may be eligible for vocational rehabilitation services if you are unable to return to the job due to a permanent physical impairment or permanent restrictions as a result of your injury or occupational disease.    Transportation and Per Pedro Luis Reimbursement: You may be eligible for travel expenses and per pedro luis associated with medical  treatment.    Reopening: You may be able to reopen your claim if your condition worsens after claim closure.    Appeal Process: If you disagree with a written determination issued by the insurer or the insurer does not respond to your request, you may appeal to the Department of Administration, , by following the instructions contained in your determination letter. You must appeal the determination within 70 days from the date of the determination letter at [address], [address], [place], [place] [address], or [address], [place], [place] [address]. If you disagree with the  decision, you may appeal to the Department of Administration, . You must file your appeal within 30 days from the date of the  decision letter at [address], [address], [place], [place] [address], or [address] [facility] [address], [place], [place] [address]. If you disagree with a decision of an , you may file a petition for judicial review with the District Court. You must do so within 30 days of the Appeal Officer’s decision. You may be represented by an  at your own expense or you may contact the [facility] for possible representation.    [place]  for Injured Workers (NAIW): If you disagree with a  decision, you may request that NAIW represent you without charge at an  Hearing. For information regarding denial of benefits, you may contact the [facility] at: [address], [address], [place] [address], [phone], or [address], [address], [place] [address], [phone]    To File a Complaint with the Division: If you wish to file a complaint with the  of the Division of Industrial Relations (DIR),  please contact the Workers’ Compensation Section, [address], [address], [place], [place] [address], telephone [phone], or [address]  [place] [address], [address], [place], [place] [address], telephone [phone].    For assistance with Workers’ Compensation Issues: You may contact the Office of the Governor Consumer Health Assistance, [facility] [address], [address], [place], [place] [address], Toll Free 1-424.681.7717, Web site: http://[email or web address].[email or web address]., E-mail [email or web address].                   __________________________________________________________________                                                     _________        Employee Name / Signature                                                                                                                                              Date                                                                                                                                                                                                     D-2 (rev. 06/18)

## 2020-01-08 NOTE — PROGRESS NOTES
"Subjective:   Vincent Riojas is a 49 y.o. male who presents for Leg Injury (WC/New- Lt calf injury x2days)    DOI 1/6/2020: patient states was was pushing heavy carts at work and when he went to push off, he felt a pull in his right calf muscle. Denies falling. States he went to see on site MD and was given ace wrap and told to take ibuprofen. States with mild to moderate calf pain, worsened with walking/bearing weight. States with improvement today. Denies prior injury to the area. Has been using cane to aid with walking.   HPI     Review of Systems   Constitutional: Negative for chills and fever.   Respiratory: Negative for shortness of breath.    Cardiovascular: Negative for chest pain.   Skin: Negative for itching and rash.   Neurological: Negative for tingling.     Patient's PMH, SocHx, SurgHx, FamHx, Drug allergies and medications reviewed.     Objective:   /72   Pulse 93   Temp 36.3 °C (97.3 °F) (Temporal)   Resp 12   Ht 1.854 m (6' 1\")   Wt 77.1 kg (170 lb)   SpO2 98%   BMI 22.43 kg/m²   Physical Exam  Vitals signs reviewed.   Constitutional:       General: He is not in acute distress.     Appearance: He is well-developed. He is not diaphoretic.   HENT:      Head: Normocephalic.      Right Ear: Hearing normal.      Left Ear: Hearing normal.      Nose: Nose normal.   Eyes:      General: Lids are normal.      Conjunctiva/sclera: Conjunctivae normal.      Pupils: Pupils are equal, round, and reactive to light.   Neck:      Musculoskeletal: Normal range of motion.   Cardiovascular:      Rate and Rhythm: Normal rate and regular rhythm.      Heart sounds: Normal heart sounds.   Pulmonary:      Effort: Pulmonary effort is normal. No respiratory distress.      Breath sounds: Normal breath sounds. No decreased breath sounds or wheezing.   Musculoskeletal:      Right lower leg: He exhibits tenderness. He exhibits no bony tenderness and no swelling.        Legs:    Skin:     General: Skin is warm.   "  Findings: No abrasion, abscess, bruising, erythema or rash.   Neurological:      Mental Status: He is alert.   Psychiatric:         Speech: Speech normal.         Behavior: Behavior normal.         Thought Content: Thought content normal.         Judgment: Judgment normal.       Right calf: TTP to the medial right calf. No bruising, erythema or swelling present. Negative Dixon's sign. Distal neurovascular and sensation intact. No pain to achilles tendon  Right calf and left calf equal measurements   Assessment/Plan:   Assessment    1. Strain of calf muscle, right, initial encounter    Restrictions per D39    Differential diagnosis, natural history, supportive care, and indications for immediate follow-up discussed.     **Please note that all invasive procedures during this visit were performed by myself and/or the Medical Assistant under the supervision of the PA or MD in office**

## 2020-01-10 ENCOUNTER — OCCUPATIONAL MEDICINE (OUTPATIENT)
Dept: URGENT CARE | Facility: PHYSICIAN GROUP | Age: 50
End: 2020-01-10
Payer: COMMERCIAL

## 2020-01-10 VITALS
WEIGHT: 170 LBS | BODY MASS INDEX: 22.53 KG/M2 | SYSTOLIC BLOOD PRESSURE: 102 MMHG | OXYGEN SATURATION: 96 % | HEART RATE: 111 BPM | DIASTOLIC BLOOD PRESSURE: 62 MMHG | RESPIRATION RATE: 16 BRPM | HEIGHT: 73 IN | TEMPERATURE: 97.2 F

## 2020-01-10 DIAGNOSIS — S86.001D ACHILLES TENDON INJURY, RIGHT, SUBSEQUENT ENCOUNTER: ICD-10-CM

## 2020-01-10 DIAGNOSIS — S86.811A STRAIN OF CALF MUSCLE, RIGHT, INITIAL ENCOUNTER: ICD-10-CM

## 2020-01-10 PROCEDURE — 99214 OFFICE O/P EST MOD 30 MIN: CPT | Performed by: FAMILY MEDICINE

## 2020-01-10 ASSESSMENT — ENCOUNTER SYMPTOMS
SHORTNESS OF BREATH: 0
VOMITING: 0
FEVER: 0
SORE THROAT: 0
COUGH: 0
LEG PAIN: 1
CHILLS: 0
NAUSEA: 0

## 2020-01-10 NOTE — LETTER
"   [facility] [address] [place] [address]  Phone:  [phone] - Fax:  [phone]   [facility] Progress Report and Disability Certification  Date of Service: 1/10/2020   No Show:  No  Date / Time of Next Visit: 1/17/2020   Claim Information   Patient Name: Vincent Riojas  Claim Number:     Employer: [employer] Date of Injury: 1/6/2020     Insurer / TPA: [employer]  ID / SSN:     Occupation: Associate  Diagnosis: Diagnoses of Achilles tendon injury, right, subsequent encounter and Strain of calf muscle, right, initial encounter were pertinent to this visit.    Medical Information   Related to Industrial Injury? Yes    Subjective Complaints:  Date of injury 1/6/2020.  49-year-old  presents and follow-up for right calf strain sustained on 1/6/2020 when forcefully pushing a cart loaded with 3 kits weighing approximately 600 pounds from a dead stop.  The patient had yielded to a robot and then was required to start pushing the loaded cart from the dead stop and experienced sudden and immediate pain in the right posterior calf.  Patient denies recollection of hearing or feeling a \" pop\".  The patient was able to attempt to ambulate yet after 5 minutes was unable to continue ambulating because of the pain.  Patient was originally evaluated in the urgent care clinic on 1/7/2020 and advised to light duty yet the employee has not been on the work schedule since the injury.  Patient has been using a cane to ambulate.  Patient states the pain is minimally improved approximately 10% improved from the prior visit on 1/7/2020   Objective Findings: Physical exam, right leg: Markedly tender to palpation in the posterior aspect mid calf, trace swelling, trace ecchymosis, tenderness defect at gastrocnemius Achilles juncture noted, positive Higginbotham test right side in comparison to the left.  Negative Homans sign.  Motor strength 5 out of " 5 to plantar flexion and extension yet with guarding.   Pre-Existing Condition(s):     Assessment:   Condition Same    Status: Additional Care Required  Permanent Disability:No    Plan:   Comments:Walking boot cast, MRI right leg, occupational health follow-up    Diagnostics: MRI    Comments:       Disability Information   Status: Released to Restricted Duty    From:  1/10/2020  Through: 2020 Restrictions are: Temporary   Physical Restrictions   Sitting:    Standin hrs/day Stooping:    Bending:      Squatting:    Walkin hrs/day Climbing:    Pushing:      Pulling:    Other:    Reaching Above Shoulder (L):   Reaching Above Shoulder (R):       Reaching Below Shoulder (L):    Reaching Below Shoulder (R):      Not to exceed Weight Limits   Carrying(hrs): 0 Weight Limit(lb): < or = to 10 pounds Lifting(hrs): 0 Weight  Limit(lb): < or = to 10 pounds   Comments: Seated work only, walking boot cast ,recommend cane    Repetitive Actions   Hands: i.e. Fine Manipulations from Grasping:     Feet: i.e. Operating Foot Controls:     Driving / Operate Machinery:     Physician Name: Zachary Hallman M.D. Physician Signature: ZACHARY Morel M.D. e-Signature: Dr. Sukh Putnam, Medical Director   Clinic Name / Location: [address] [address] Clinic Phone Number: Dept: [phone]   Appointment Time: 4:00 Pm Visit Start Time: 3:47 PM   Check-In Time:  3:41 Pm Visit Discharge Time:  4:50 PM   Original-Treating Physician or Chiropractor    Page 2-Insurer/TPA    Page 3-Employer    Page 4-Employee

## 2020-01-11 NOTE — PATIENT INSTRUCTIONS
"Achilles Tendon Rupture  The Achilles tendon is a cord-like band that connects the muscles of your lower leg (calf) to your heel. An Achilles tendon rupture is an injury that involves a tear in this tendon. This tendon is the most common site of tendon tearing.  What are the causes?  This condition may be caused by:  · Stress from a sudden stretching of the tendon. For example, this may occur when you land from a jump or when your heel drops down into a hole on uneven ground.  · A hard, direct hit to the tendon.  · Pushing off your foot forcefully, such as when sprinting, jumping, or changing direction while running.  What increases the risk?  This condition is more likely to develop in:  · Runners.  · People who play sports that involve sprinting, running, or jumping.  · People who play contact sports.  · People with a weak Achilles tendon. Tendons can weaken from aging, repeat injuries, and chronic tendinitis.  · Males who are 30-50 years of age, especially those who do not exercise regularly.  What are the signs or symptoms?  Symptoms of this condition include:  · Hearing a \"pop\" at the time of injury.  · Severe, sudden pain in the back of the ankle.  · Swelling and bruising.  · Inability to actively point your toes down.  · Pain when standing or walking.  · A feeling of giving way when you step on the affected side.  How is this diagnosed?  This condition is usually diagnosed with a physical exam. During the exam, your health care provider may:  · Touch the tendon and the structures around it.  · Squeeze your calf to see if your foot moves.  · Ask you to point and flex your foot.  Sometimes, tests are done in addition to an exam. Tests may include:  · An ultrasound.  · An X-ray.  · MRI.  How is this treated?  This condition may be treated with:  · Ice applied to the area.  · Pain medicine.  · Rest.  · Crutches.  · A cast, splint, or other device to keep the ankle from moving (keep it immobilized).  · Heel wedges " to reduce the stretch on your tendon as it heals.  · Surgery. This option may depend on your age and your activity level.  Follow these instructions at home:  If you have a splint or brace:  · Do not put pressure on any part of the splint until it is fully hardened. This may take several hours.  · Wear the splint or brace as told by your health care provider. Remove it only as told by your health care provider.  · Loosen the splint or brace if your toes tingle, become numb, or turn cold and blue.  · Do not let your splint or brace get wet if it is not waterproof.  · Keep the splint or brace clean.  If you have a cast:  · Do not put pressure on any part of the cast until it is fully hardened. This may take several hours.  · Do not stick anything inside the cast to scratch your skin. Doing that increases your risk of infection.  · Check the skin around the cast every day. Tell your health care provider about any concerns.  · You may put lotion on dry skin around the edges of the cast. Do not put lotion on the skin underneath the cast.  · Do not let your cast get wet if it is not waterproof.  · Keep the cast clean.  Bathing  · Do not take baths, swim, or use a hot tub until your health care provider approves. Ask your health care provider if you can take showers. You may only be allowed to take sponge baths for bathing.  · If your cast, splint, or brace is not waterproof, cover it with a watertight covering when you take a bath or a shower.  Managing pain, stiffness, and swelling  · If directed, apply ice to the injured area.  ¨ Put ice in a plastic bag.  ¨ Place a towel between your skin and the bag.  ¨ Leave the ice on for 20 minutes, 2-3 times a day.  · Move your toes often to avoid stiffness and to lessen swelling.  · Raise (elevate) the injured area above the level of your heart while you are sitting or lying down. Do not dangle your leg over a chair, couch, or bed.  Driving  · Do not drive or operate heavy  machinery while taking prescription pain medicine.  · Ask your health care provider when it is safe to drive if you have a cast, splint, or brace on a leg or foot that you use for driving.  Activity  · Return to your normal activities as told by your health care provider. Ask your health care provider what activities are safe for you.  · Do exercises only as told by your health care provider.  General instructions  · Do not use the injured limb to support your body weight until your health care provider says that you can. Use crutches as told by your health care provider.  · Do not use any tobacco products, such as cigarettes, chewing tobacco, and e-cigarettes. Tobacco can delay bone healing. If you need help quitting, ask your health care provider.  · Take over-the-counter and prescription medicines only as told by your health care provider.  · Keep all follow-up visits as told by your health care provider. This is important.  How is this prevented?  · Warm up and stretch before being active.  · Cool down and stretch after being active.  · Give your body time to rest between periods of activity.  · Make sure to use equipment that fits you.  · Be safe and responsible while being active to avoid falls.  · Each week, do at least 150 minutes of moderate-intensity exercise, such as brisk walking or water aerobics.  · Spread your workouts over the whole week, instead of just working out intensely one or two days of the week.  · Make slow, incremental changes in intensity, distance, or time for running or sporting activity.  · Maintain physical fitness, including:  ¨ Strength.  ¨ Flexibility.  ¨ Cardiovascular fitness.  ¨ Endurance.  Contact a health care provider if:  · Your pain and swelling increase.  · Your pain is not controlled with medicines.  · You develop new, unexplained symptoms.  · Your symptoms get worse.  · You cannot move your toes or foot.  · You develop warmth and swelling in your foot.  · You have an  unexplained fever.  This information is not intended to replace advice given to you by your health care provider. Make sure you discuss any questions you have with your health care provider.  Document Released: 12/18/2006 Document Revised: 08/22/2017 Document Reviewed: 11/09/2016  [employer] Interactive Patient Education © 2017 [employer]

## 2020-01-11 NOTE — PROGRESS NOTES
"Subjective:      Vincent Riojas is a 49 y.o. male who presents with Leg Pain (WC/FV Rt calf injury, slowly improving, small knot-tender/painful to the touch  x5days )      Date of injury 1/6/2020.  49-year-old  presents and follow-up for right calf strain sustained on 1/6/2020 when forcefully pushing a cart loaded with 3 kits weighing approximately 600 pounds from a dead stop.  The patient had yielded to a robot and then was required to start pushing the loaded cart from the dead stop and experienced sudden and immediate pain in the right posterior calf.  Patient denies recollection of hearing or feeling a \" pop\".  The patient was able to attempt to ambulate yet after 5 minutes was unable to continue ambulating because of the pain.  Patient was originally evaluated in the urgent care clinic on 1/7/2020 and advised to light duty yet the employee has not been on the work schedule since the injury.  Patient has been using a cane to ambulate.  Patient states the pain is minimally improved approximately 10% improved from the prior visit on 1/7/2020     49-year-old  presents in follow-up for right calf strain sustained on 1/6/2020.    Leg Pain   Pertinent negatives include no chest pain, chills, coughing, fever, nausea, rash, sore throat or vomiting.       Review of Systems   Constitutional: Negative for chills and fever.   HENT: Negative for sore throat.    Respiratory: Negative for cough and shortness of breath.    Cardiovascular: Negative for chest pain.   Gastrointestinal: Negative for nausea and vomiting.   Skin: Negative for rash.          Objective:     /62   Pulse (!) 111   Temp 36.2 °C (97.2 °F) (Temporal)   Resp 16   Ht 1.854 m (6' 1\")   Wt 77.1 kg (170 lb)   SpO2 96%   BMI 22.43 kg/m²      Physical Exam  Vitals signs and nursing note reviewed.   Constitutional:       General: He is not in acute distress.     Appearance: He is well-developed.   HENT:      " Head: Normocephalic and atraumatic.      Right Ear: External ear normal.      Left Ear: External ear normal.      Nose: Nose normal.      Mouth/Throat:      Mouth: Mucous membranes are moist.   Eyes:      Conjunctiva/sclera: Conjunctivae normal.      Pupils: Pupils are equal, round, and reactive to light.   Cardiovascular:      Rate and Rhythm: Normal rate and regular rhythm.      Heart sounds: No murmur.   Pulmonary:      Effort: Pulmonary effort is normal. No respiratory distress.      Breath sounds: Normal breath sounds.   Abdominal:      General: There is no distension.      Palpations: Abdomen is soft.      Tenderness: There is no tenderness.   Musculoskeletal: Normal range of motion.      Right ankle: Achilles tendon exhibits pain, defect and abnormal Higginbotham's test results.      Right lower leg: He exhibits tenderness, swelling and deformity (Tendon defect gastrocnemius Achilles musculotendinous juncture, positive Higginbotham test right in comparison to left). He exhibits no bony tenderness.   Skin:     General: Skin is warm and dry.   Neurological:      General: No focal deficit present.      Mental Status: He is alert and oriented to person, place, and time. Mental status is at baseline.      Gait: Gait (gait at baseline) normal.   Psychiatric:         Judgment: Judgment normal.         Physical exam, right leg: Markedly tender to palpation in the posterior aspect mid calf, trace swelling, trace ecchymosis, tenderness defect at gastrocnemius Achilles juncture noted, positive Higginbotham test right side in comparison to the left.  Negative Homans sign.  Motor strength 5 out of 5 to plantar flexion and extension yet with guarding.       Assessment/Plan:       1. Achilles tendon injury, right, subsequent encounter    - QB-[id number]-W/O RIGHT; Future  - Walking Boot    See D-39 form    Occupational health clinic follow-up    See D 39 form

## 2020-02-03 ENCOUNTER — OCCUPATIONAL MEDICINE (OUTPATIENT)
Dept: OCCUPATIONAL MEDICINE | Facility: CLINIC | Age: 50
End: 2020-02-03
Payer: COMMERCIAL

## 2020-02-03 VITALS
WEIGHT: 186 LBS | OXYGEN SATURATION: 100 % | DIASTOLIC BLOOD PRESSURE: 76 MMHG | BODY MASS INDEX: 24.65 KG/M2 | RESPIRATION RATE: 14 BRPM | HEIGHT: 73 IN | SYSTOLIC BLOOD PRESSURE: 124 MMHG | HEART RATE: 84 BPM

## 2020-02-03 DIAGNOSIS — S86.001D ACHILLES TENDON INJURY, RIGHT, SUBSEQUENT ENCOUNTER: ICD-10-CM

## 2020-02-03 DIAGNOSIS — S86.811D STRAIN OF CALF MUSCLE, RIGHT, SUBSEQUENT ENCOUNTER: ICD-10-CM

## 2020-02-03 PROCEDURE — 99213 OFFICE O/P EST LOW 20 MIN: CPT | Performed by: NURSE PRACTITIONER

## 2020-02-03 RX ORDER — DICLOFENAC SODIUM 75 MG/1
75 TABLET, DELAYED RELEASE ORAL 2 TIMES DAILY
Qty: 60 TAB | Refills: 0 | Status: SHIPPED | OUTPATIENT
Start: 2020-02-03 | End: 2020-03-09

## 2020-02-03 ASSESSMENT — ENCOUNTER SYMPTOMS
CARDIOVASCULAR NEGATIVE: 1
RESPIRATORY NEGATIVE: 1
WEAKNESS: 1
PSYCHIATRIC NEGATIVE: 1
MYALGIAS: 1
TINGLING: 0
SENSORY CHANGE: 1

## 2020-02-03 ASSESSMENT — PAIN SCALES - GENERAL: PAINLEVEL: 7=MODERATE-SEVERE PAIN

## 2020-02-03 NOTE — LETTER
"   [facility] [place]  [address] [place] [address],   [address] [address]  Phone:  [phone] - Fax:  [phone]   [facility] Progress Report and Disability Certification  Date of Service: 2/3/2020   No Show:  No  Date / Time of Next Visit:  Discharged/ Transfer care to Orthopedics    2/13/19 @ 1:45pm   Claim Information   Patient Name: Vincent Riojas  Claim Number:     Employer: [employer]  Date of Injury: 1/6/2020     Insurer / TPA: [employer]  ID / SSN:     Occupation: Associate  Diagnosis: Diagnoses of Achilles tendon injury, right, subsequent encounter and Strain of calf muscle, right, subsequent encounter were pertinent to this visit.    Medical Information   Related to Industrial Injury? Yes    Subjective Complaints:  Date of injury 1/6/2020.  49-year-old  presents and follow-up for right calf strain sustained on 1/6/2020 when forcefully pushing a cart loaded with 3 kits weighing approximately 600 pounds from a dead stop.  The patient had yielded to a robot and then was required to start pushing the loaded cart from the dead stop and experienced sudden and immediate pain in the right posterior calf.  Patient denies recollection of hearing or feeling a \" pop\".  The patient was able to attempt to ambulate yet after 5 minutes was unable to continue ambulating because of the pain.  Patient was originally evaluated in the urgent care clinic on 1/7/2020 and advised to light duty yet the employee has not been on the work schedule since the injury.  Patient has been using a cane to ambulate.  Patient states the pain is minimally improved approximately 10% improved from the prior visit on 1/7/2020     Today overall no improvement of symptoms. Pain is constant, sharp if walking, and weakness. Patient denies tingling and swelling has minimized. Patient has not been able to feel the cold due to neuropathy from the diabetes. Patient " states that heat causes more distress. Patient states that he is unable walk on your leg without the cane or the boot because of severity of symptoms. Reviewed med list no longer taking MS Contin or robaxin. Patient has not been back to work since the incident. Due to continued severity of symptoms patient will be transferred to Orthopedics for further evaluation and management of symptoms. Plan of care discussed with patient.    Objective Findings: Right leg: Markedly tender to palpation in the posterior aspect mid calf, trace swelling, trace ecchymosis, tenderness defect at gastrocnemius Achilles juncture noted, positive Higginbotham test right side in comparison to the left.  Negative Homans sign.  Motor strength 5 out of 5 to plantar flexion and extension yet with guarding. Antalgic gait   Pre-Existing Condition(s):     Assessment:   Condition Worsened    Status: Discharged / Care Transfer  Permanent Disability:No    Plan: Transfer Care    Diagnostics:      Comments:  Follow-up in 2 weeks, if not seen by Orthopedics  Transfer care to Orthopedics  Work restrictions, per orthopedics  Take diclofenac as prescribed  Apply heat as needed   Continue with boot and cane as needed       Disability Information   Status: Released to Restricted Duty    From:  2/3/2020  Through:   Restrictions are: Temporary   Physical Restrictions   Sitting:    Standing:  < or = to 1 hr/day Stooping:    Bending:      Squatting:    Walking:  < or = to 1 hr/day Climbin hrs/day Pushing:      Pulling:    Other:    Reaching Above Shoulder (L):   Reaching Above Shoulder (R):       Reaching Below Shoulder (L):    Reaching Below Shoulder (R):      Not to exceed Weight Limits   Carrying(hrs):   Weight Limit(lb): < or = to 10 pounds Lifting(hrs):   Weight  Limit(lb): < or = to 10 pounds   Comments: Seated work only, walking boot cast ,recommend cane     Repetitive Actions   Hands: i.e. Fine Manipulations from Grasping:     Feet: i.e. Operating Foot  Controls:     Driving / Operate Machinery:     Physician Name: MARY BETH Villafuerte Physician Signature:   e-Signature: Dr. Sukh Putnam, Medical Director   Clinic Name / Location: [address],   [address]  [place] [address] Clinic Phone Number: Dept: [phone]   Appointment Time: 11:30 Am Visit Start Time: 11:26 AM   Check-In Time:  11:21 Am Visit Discharge Time:  12:10pm   Original-Treating Physician or Chiropractor    Page 2-Insurer/TPA    Page 3-Employer    Page 4-Employee

## 2020-02-03 NOTE — PROGRESS NOTES
"Subjective:      Vincent Riojas is a 49 y.o. male who presents with Follow-Up (WC Date of injury 1/6/2020 Right Calf - Worse - RM 16)      Date of injury 1/6/2020.  49-year-old  presents and follow-up for right calf strain sustained on 1/6/2020 when forcefully pushing a cart loaded with 3 kits weighing approximately 600 pounds from a dead stop.  The patient had yielded to a robot and then was required to start pushing the loaded cart from the dead stop and experienced sudden and immediate pain in the right posterior calf.  Patient denies recollection of hearing or feeling a \" pop\".  The patient was able to attempt to ambulate yet after 5 minutes was unable to continue ambulating because of the pain.  Patient was originally evaluated in the urgent care clinic on 1/7/2020 and advised to light duty yet the employee has not been on the work schedule since the injury.  Patient has been using a cane to ambulate.  Patient states the pain is minimally improved approximately 10% improved from the prior visit on 1/7/2020     Today overall no improvement of symptoms. Pain is constant, sharp if walking, and weakness. Patient denies tingling and swelling has minimized. Patient has not been able to feel the cold due to neuropathy from the diabetes. Patient states that heat causes more distress. Patient states that he is unable walk on your leg without the cane or the boot because of severity of symptoms. Reviewed med list no longer taking MS Contin or robaxin. Patient has not been back to work since the incident. Due to continued severity of symptoms patient will be transferred to Orthopedics for further evaluation and management of symptoms. Plan of care discussed with patient.      HPI    Review of Systems   Respiratory: Negative.    Cardiovascular: Negative.    Musculoskeletal: Positive for joint pain and myalgias.   Skin:        Trace bruising, swelling, and markedly pos TTP    Neurological: Positive " "for sensory change and weakness. Negative for tingling.   Psychiatric/Behavioral: Negative.         ROS: All systems were reviewed on intake form, form was reviewed and signed. See scanned documents in media. Pertinent positives and negatives included in HPI.    PMH: No pertinent past medical history to this problem  MEDS: Medications were reviewed in Epic  ALLERGIES:   Allergies   Allergen Reactions   • Hydrocodone-Acetaminophen Vomiting   • Peanut-Derived Anaphylaxis   • Penicillins Hives     Patient tolerated Ancef 08/13/15     SOCH[employer]: Works as  at "[employer]"  FH: No pertinent family history to this problem       Objective:     /76   Pulse 84   Resp 14   Ht 1.854 m (6' 1\")   Wt 84.4 kg (186 lb)   SpO2 100%   BMI 24.54 kg/m²      Physical Exam  Constitutional:       General: He is not in acute distress.     Appearance: Normal appearance. He is not ill-appearing.   Cardiovascular:      Rate and Rhythm: Normal rate and regular rhythm.      Pulses: Normal pulses.   Pulmonary:      Effort: Pulmonary effort is normal.   Musculoskeletal:         General: Swelling, tenderness and signs of injury present. No deformity.      Right lower leg: He exhibits tenderness and swelling. He exhibits no bony tenderness, no deformity and no laceration. Edema present.        Legs:    Skin:     General: Skin is warm and dry.      Capillary Refill: Capillary refill takes less than 2 seconds.      Findings: Bruising present. No erythema.   Neurological:      General: No focal deficit present.      Mental Status: He is alert and oriented to person, place, and time.      Cranial Nerves: No cranial nerve deficit.      Sensory: Sensory deficit present.      Motor: Weakness present.      Gait: Gait abnormal.      Deep Tendon Reflexes: Reflexes normal.   Psychiatric:         Mood and Affect: Mood normal.         Behavior: Behavior normal.         Right leg: Markedly tender to palpation in the posterior aspect mid calf, " trace swelling, trace ecchymosis, tenderness defect at gastrocnemius Achilles juncture noted, positive Higginbotham test right side in comparison to the left.  Negative Homans sign.  Motor strength 5 out of 5 to plantar flexion and extension yet with guarding. Antalgic gait       Assessment/Plan:       1. Achilles tendon injury, right, subsequent encounter    - diclofenac EC (VOLTAREN) 75 MG Tablet Delayed Response; Take 1 Tab by mouth 2 times a day.  Dispense: 60 Tab; Refill: 0  - REFERRAL TO ORTHOPEDICS    2. Strain of calf muscle, right, subsequent encounter    - diclofenac EC (VOLTAREN) 75 MG Tablet Delayed Response; Take 1 Tab by mouth 2 times a day.  Dispense: 60 Tab; Refill: 0  - REFERRAL TO ORTHOPEDICS      Follow-up in 2 weeks, if not seen by Orthopedics  Transfer care to Orthopedics  Work restrictions, per orthopedics  Take diclofenac as prescribed  Apply heat as needed   Continue with boot and cane as needed   Diclofenac as prescribed

## 2020-02-05 DIAGNOSIS — S86.001D ACHILLES TENDON INJURY, RIGHT, SUBSEQUENT ENCOUNTER: ICD-10-CM

## 2020-02-13 ENCOUNTER — OCCUPATIONAL MEDICINE (OUTPATIENT)
Dept: OCCUPATIONAL MEDICINE | Facility: CLINIC | Age: 50
End: 2020-02-13
Payer: COMMERCIAL

## 2020-02-13 VITALS
SYSTOLIC BLOOD PRESSURE: 120 MMHG | WEIGHT: 178 LBS | BODY MASS INDEX: 23.59 KG/M2 | TEMPERATURE: 98.8 F | OXYGEN SATURATION: 100 % | DIASTOLIC BLOOD PRESSURE: 80 MMHG | HEIGHT: 73 IN | HEART RATE: 107 BPM | RESPIRATION RATE: 14 BRPM

## 2020-02-13 DIAGNOSIS — S86.811D STRAIN OF CALF MUSCLE, RIGHT, SUBSEQUENT ENCOUNTER: ICD-10-CM

## 2020-02-13 DIAGNOSIS — S86.001D ACHILLES TENDON INJURY, RIGHT, SUBSEQUENT ENCOUNTER: ICD-10-CM

## 2020-02-13 PROCEDURE — 99213 OFFICE O/P EST LOW 20 MIN: CPT | Performed by: NURSE PRACTITIONER

## 2020-02-13 ASSESSMENT — ENCOUNTER SYMPTOMS
NEUROLOGICAL NEGATIVE: 1
MYALGIAS: 1
RESPIRATORY NEGATIVE: 1
PSYCHIATRIC NEGATIVE: 1
CARDIOVASCULAR NEGATIVE: 1
CONSTITUTIONAL NEGATIVE: 1

## 2020-02-13 ASSESSMENT — PAIN SCALES - GENERAL: PAINLEVEL: 8=MODERATE-SEVERE PAIN

## 2020-02-13 NOTE — LETTER
"[facility] [place]  [address],   [address] [place] [address]  Phone:  [phone] - Fax:  [phone]   [facility] Progress Report and Disability Certification  Date of Service: 2/13/2020   No Show:  No  Date / Time of Next Visit:  Discharged/ Transfer care to Orthopedics   Claim Information   Patient Name: Vincent Riojas  Claim Number:     Employer: [employer]  Date of Injury: 1/6/2020     Insurer / TPA: Sammy Elmwood  ID / SSN:     Occupation: Associate  Diagnosis: Diagnoses of Achilles tendon injury, right, subsequent encounter and Strain of calf muscle, right, subsequent encounter were pertinent to this visit.    Medical Information   Related to Industrial Injury? Yes    Subjective Complaints:  Date of injury 1/6/2020.  49-year-old  presents and follow-up for right calf strain sustained on 1/6/2020 when forcefully pushing a cart loaded with 3 kits weighing approximately 600 pounds from a dead stop.  The patient had yielded to a robot and then was required to start pushing the loaded cart from the dead stop and experienced sudden and immediate pain in the right posterior calf.  Patient denies recollection of hearing or feeling a \" pop\".  The patient was able to attempt to ambulate yet after 5 minutes was unable to continue ambulating because of the pain.  Patient was originally evaluated in the urgent care clinic on 1/7/2020 and advised to light duty yet the employee has not been on the work schedule since the injury.  Patient has been using a cane to ambulate.  Patient states the pain is minimally improved approximately 10% improved from the prior visit on 1/7/2020      Today no improvement, feels worse. Pain is constant, sharp if walking, and weakness. Patient denies tingling and swelling. Patient has not been able to feel the cold due to neuropathy from the diabetes. Patient unable to tolerate heat without increase in " symptoms. Patient states that he is unable walk on his leg without the cane or the boot because of severity of symptoms. Reviewed med list no longer taking MS Contin or robaxin.  Patient states that Diclofenac was helpful initially, but he eventfully was taking 3 times the dose.  Patient has not been back to work since the incident. Due to continued severity of symptoms patient will be transferred to Orthopedics. Patient is scheduled for 3-3-20 with Dr. Kumar. MRI is scheduled for 20.  Plan of care discussed with patient.    Objective Findings: Right leg: Markedly tender to palpation in the posterior aspect mid calf, trace swelling, trace ecchymosis, tenderness defect at gastrocnemius Achilles juncture noted, positive Higginbotham test right side in comparison to the left.  Negative Homans sign.  Motor strength 5 out of 5 to plantar flexion and extension yet with guarding. Antalgic gait   Pre-Existing Condition(s):     Assessment:   Condition Same    Status: Discharged / Care Transfer  Permanent Disability:No    Plan: Transfer CareDiagnostics    Diagnostics: MRI    Comments:  Transfer care to orthopedics  Keep appointment with orthopedics for 3/3/2020  Restricted duty per orthopedics  MRI scheduled for  20  Continue with OTC Tylenol and ibuprofen as needed  Apply heat as needed   Continue with boot and cane as needed         Disability Information   Status: Released to Restricted Duty    From:  2020  Through:   Restrictions are: Temporary   Physical Restrictions   Sitting:    Standing:  < or = to 2 hrs/day Stooping:    Bending:      Squatting:    Walking:  < or = to 2 hrs/day Climbin hrs/day Pushing:      Pulling:    Other:    Reaching Above Shoulder (L):   Reaching Above Shoulder (R):       Reaching Below Shoulder (L):    Reaching Below Shoulder (R):      Not to exceed Weight Limits   Carrying(hrs):   Weight Limit(lb):   Lifting(hrs):   Weight  Limit(lb):     Comments:      Repetitive Actions      Hands: i.e. Fine Manipulations from Grasping:     Feet: i.e. Operating Foot Controls:     Driving / Operate Machinery:     Physician Name: MARY BETH Villafuerte Physician Signature:   e-Signature: Dr. Sukh Putnam, Medical Director   Clinic Name / Location: [address],   [address]  [place] [address] Clinic Phone Number: Dept: [phone]   Appointment Time: 1:45 Pm Visit Start Time: 1:56 PM   Check-In Time:  1:46 Pm Visit Discharge Time: 2:28 pm    Original-Treating Physician or Chiropractor    Page 2-Insurer/TPA    Page 3-Employer    Page 4-Employee

## 2020-02-13 NOTE — PROGRESS NOTES
"Subjective:      Vincent Riojas is a 49 y.o. male who presents with Follow-Up (WC Date of injury 1/6/2020 Right Calf - Same - RM 16)      Date of injury 1/6/2020.  49-year-old  presents and follow-up for right calf strain sustained on 1/6/2020 when forcefully pushing a cart loaded with 3 kits weighing approximately 600 pounds from a dead stop.  The patient had yielded to a robot and then was required to start pushing the loaded cart from the dead stop and experienced sudden and immediate pain in the right posterior calf.  Patient denies recollection of hearing or feeling a \" pop\".  The patient was able to attempt to ambulate yet after 5 minutes was unable to continue ambulating because of the pain.  Patient was originally evaluated in the urgent care clinic on 1/7/2020 and advised to light duty yet the employee has not been on the work schedule since the injury.  Patient has been using a cane to ambulate.  Patient states the pain is minimally improved approximately 10% improved from the prior visit on 1/7/2020      Today no improvement, feels worse. Pain is constant, sharp if walking, and weakness. Patient denies tingling and swelling. Patient has not been able to feel the cold due to neuropathy from the diabetes. Patient unable to tolerate heat without increase in symptoms. Patient states that he is unable walk on his leg without the cane or the boot because of severity of symptoms. Reviewed med list no longer taking MS Contin or robaxin.  Patient states that Diclofenac was helpful initially, but he eventfully was taking 3 times the dose.  Patient has not been back to work since the incident. Due to continued severity of symptoms patient will be transferred to Orthopedics. Patient is scheduled for 3-3-20 with Dr. Kumar. MRI is scheduled for 2/28/20.  Plan of care discussed with patient.      HPI    Review of Systems   Constitutional: Negative.    Respiratory: Negative.    Cardiovascular: " "Negative.    Musculoskeletal: Positive for joint pain and myalgias.   Skin: Negative for itching and rash.        Trace and swelling, bruising posterior aspect mid calf   Neurological: Negative.    Psychiatric/Behavioral: Negative.         SOCHX: Works as  at [employer]  FH: No pertinent family history to this problem     Objective:     /80   Pulse (!) 107   Temp 37.1 °C (98.8 °F) (Temporal)   Resp 14   Ht 1.854 m (6' 1\")   Wt 80.7 kg (178 lb)   SpO2 100%   BMI 23.48 kg/m²      Physical Exam  Constitutional:       General: He is not in acute distress.     Appearance: Normal appearance. He is not ill-appearing.   Cardiovascular:      Rate and Rhythm: Normal rate and regular rhythm.   Pulmonary:      Effort: Pulmonary effort is normal.   Musculoskeletal:         General: Swelling, tenderness and signs of injury present.      Right ankle: He exhibits decreased range of motion, swelling and ecchymosis. Tenderness. Achilles tendon exhibits pain and abnormal Higginbotham's test results.   Skin:     General: Skin is warm and dry.      Capillary Refill: Capillary refill takes less than 2 seconds.      Findings: Bruising present. No erythema.   Neurological:      General: No focal deficit present.      Mental Status: He is alert and oriented to person, place, and time.      Cranial Nerves: No cranial nerve deficit.      Sensory: No sensory deficit.      Motor: Weakness present.      Gait: Gait abnormal.      Deep Tendon Reflexes: Reflexes normal.   Psychiatric:         Mood and Affect: Mood normal.         Behavior: Behavior normal.         Right leg: Markedly tender to palpation in the posterior aspect mid calf, trace swelling, trace ecchymosis, tenderness defect at gastrocnemius Achilles juncture noted, positive Higginbotham test right side in comparison to the left.  Negative Homans sign.  Motor strength 5 out of 5 to plantar flexion and extension yet with guarding. Antalgic gait       Assessment/Plan: "       1. Achilles tendon injury, right, subsequent encounter      2. Strain of calf muscle, right, subsequent encounter    Transfer care to orthopedics   Keep appointment with orthopedics for 3/3/2020   Restricted duty per orthopedics   MRI scheduled for  2/28/20   Continue with OTC Tylenol and ibuprofen as needed   Apply heat as needed   Continue with boot and cane as needed

## 2020-03-09 ENCOUNTER — APPOINTMENT (OUTPATIENT)
Dept: RADIOLOGY | Facility: MEDICAL CENTER | Age: 50
DRG: 204 | End: 2020-03-09
Attending: EMERGENCY MEDICINE
Payer: MEDICAID

## 2020-03-09 ENCOUNTER — HOSPITAL ENCOUNTER (INPATIENT)
Facility: MEDICAL CENTER | Age: 50
LOS: 1 days | DRG: 204 | End: 2020-03-11
Attending: EMERGENCY MEDICINE | Admitting: INTERNAL MEDICINE
Payer: MEDICAID

## 2020-03-09 LAB
ALBUMIN SERPL BCP-MCNC: 3.4 G/DL (ref 3.2–4.9)
ALBUMIN/GLOB SERPL: 0.8 G/DL
ALP SERPL-CCNC: 614 U/L (ref 30–99)
ALT SERPL-CCNC: 74 U/L (ref 2–50)
ANION GAP SERPL CALC-SCNC: 6 MMOL/L (ref 0–11.9)
APPEARANCE UR: CLEAR
AST SERPL-CCNC: 52 U/L (ref 12–45)
BACTERIA #/AREA URNS HPF: NEGATIVE /HPF
BASOPHILS # BLD AUTO: 0.6 % (ref 0–1.8)
BASOPHILS # BLD: 0.02 K/UL (ref 0–0.12)
BILIRUB SERPL-MCNC: 0.6 MG/DL (ref 0.1–1.5)
BILIRUB UR QL STRIP.AUTO: NEGATIVE
BUN SERPL-MCNC: 20 MG/DL (ref 8–22)
C PNEUM DNA SPEC QL NAA+PROBE: NOT DETECTED
CALCIUM SERPL-MCNC: 9.3 MG/DL (ref 8.5–10.5)
CHLORIDE SERPL-SCNC: 95 MMOL/L (ref 96–112)
CO2 SERPL-SCNC: 26 MMOL/L (ref 20–33)
COLOR UR: YELLOW
CREAT SERPL-MCNC: 0.81 MG/DL (ref 0.5–1.4)
EOSINOPHIL # BLD AUTO: 0.03 K/UL (ref 0–0.51)
EOSINOPHIL NFR BLD: 0.9 % (ref 0–6.9)
EPI CELLS #/AREA URNS HPF: NEGATIVE /HPF
ERYTHROCYTE [DISTWIDTH] IN BLOOD BY AUTOMATED COUNT: 35.7 FL (ref 35.9–50)
FLUAV H1 2009 PAND RNA SPEC QL NAA+PROBE: NOT DETECTED
FLUAV H1 RNA SPEC QL NAA+PROBE: NOT DETECTED
FLUAV H3 RNA SPEC QL NAA+PROBE: NOT DETECTED
FLUAV RNA SPEC QL NAA+PROBE: NEGATIVE
FLUAV RNA SPEC QL NAA+PROBE: NOT DETECTED
FLUBV RNA SPEC QL NAA+PROBE: NEGATIVE
FLUBV RNA SPEC QL NAA+PROBE: NOT DETECTED
GLOBULIN SER CALC-MCNC: 4.3 G/DL (ref 1.9–3.5)
GLUCOSE SERPL-MCNC: 420 MG/DL (ref 65–99)
GLUCOSE UR STRIP.AUTO-MCNC: >=1000 MG/DL
HADV DNA SPEC QL NAA+PROBE: NOT DETECTED
HCOV RNA SPEC QL NAA+PROBE: NOT DETECTED
HCT VFR BLD AUTO: 40.7 % (ref 42–52)
HGB BLD-MCNC: 14.3 G/DL (ref 14–18)
HMPV RNA SPEC QL NAA+PROBE: NOT DETECTED
HPIV1 RNA SPEC QL NAA+PROBE: NOT DETECTED
HPIV2 RNA SPEC QL NAA+PROBE: NOT DETECTED
HPIV3 RNA SPEC QL NAA+PROBE: NOT DETECTED
HPIV4 RNA SPEC QL NAA+PROBE: NOT DETECTED
HYALINE CASTS #/AREA URNS LPF: ABNORMAL /LPF
IMM GRANULOCYTES # BLD AUTO: 0.03 K/UL (ref 0–0.11)
IMM GRANULOCYTES NFR BLD AUTO: 0.9 % (ref 0–0.9)
KETONES UR STRIP.AUTO-MCNC: NEGATIVE MG/DL
LEUKOCYTE ESTERASE UR QL STRIP.AUTO: NEGATIVE
LYMPHOCYTES # BLD AUTO: 1.1 K/UL (ref 1–4.8)
LYMPHOCYTES NFR BLD: 31.9 % (ref 22–41)
M PNEUMO DNA SPEC QL NAA+PROBE: NOT DETECTED
MAGNESIUM SERPL-MCNC: 2 MG/DL (ref 1.5–2.5)
MCH RBC QN AUTO: 26.7 PG (ref 27–33)
MCHC RBC AUTO-ENTMCNC: 35.1 G/DL (ref 33.7–35.3)
MCV RBC AUTO: 76.1 FL (ref 81.4–97.8)
MICRO URNS: ABNORMAL
MONOCYTES # BLD AUTO: 0.46 K/UL (ref 0–0.85)
MONOCYTES NFR BLD AUTO: 13.3 % (ref 0–13.4)
NEUTROPHILS # BLD AUTO: 1.81 K/UL (ref 1.82–7.42)
NEUTROPHILS NFR BLD: 52.4 % (ref 44–72)
NITRITE UR QL STRIP.AUTO: NEGATIVE
NRBC # BLD AUTO: 0 K/UL
NRBC BLD-RTO: 0 /100 WBC
PH UR STRIP.AUTO: 5.5 [PH] (ref 5–8)
PLATELET # BLD AUTO: 186 K/UL (ref 164–446)
PMV BLD AUTO: 10.4 FL (ref 9–12.9)
POTASSIUM SERPL-SCNC: 4.6 MMOL/L (ref 3.6–5.5)
PROT SERPL-MCNC: 7.7 G/DL (ref 6–8.2)
PROT UR QL STRIP: 30 MG/DL
RBC # BLD AUTO: 5.35 M/UL (ref 4.7–6.1)
RBC # URNS HPF: ABNORMAL /HPF
RBC UR QL AUTO: ABNORMAL
RSV A RNA SPEC QL NAA+PROBE: NOT DETECTED
RSV B RNA SPEC QL NAA+PROBE: NOT DETECTED
RV+EV RNA SPEC QL NAA+PROBE: NOT DETECTED
S PYO DNA SPEC NAA+PROBE: NOT DETECTED
SODIUM SERPL-SCNC: 127 MMOL/L (ref 135–145)
SP GR UR STRIP.AUTO: 1.04
UROBILINOGEN UR STRIP.AUTO-MCNC: 1 MG/DL
WBC # BLD AUTO: 3.5 K/UL (ref 4.8–10.8)
WBC #/AREA URNS HPF: ABNORMAL /HPF

## 2020-03-09 PROCEDURE — G0378 HOSPITAL OBSERVATION PER HR: HCPCS

## 2020-03-09 PROCEDURE — 71045 X-RAY EXAM CHEST 1 VIEW: CPT

## 2020-03-09 PROCEDURE — 99220 PR INITIAL OBSERVATION CARE,LEVL III: CPT | Performed by: INTERNAL MEDICINE

## 2020-03-09 PROCEDURE — 87502 INFLUENZA DNA AMP PROBE: CPT

## 2020-03-09 PROCEDURE — 83735 ASSAY OF MAGNESIUM: CPT

## 2020-03-09 PROCEDURE — 81001 URINALYSIS AUTO W/SCOPE: CPT

## 2020-03-09 PROCEDURE — 80053 COMPREHEN METABOLIC PANEL: CPT

## 2020-03-09 PROCEDURE — 87651 STREP A DNA AMP PROBE: CPT

## 2020-03-09 PROCEDURE — 87581 M.PNEUMON DNA AMP PROBE: CPT

## 2020-03-09 PROCEDURE — 36415 COLL VENOUS BLD VENIPUNCTURE: CPT

## 2020-03-09 PROCEDURE — 87633 RESP VIRUS 12-25 TARGETS: CPT

## 2020-03-09 PROCEDURE — 85025 COMPLETE CBC W/AUTO DIFF WBC: CPT

## 2020-03-09 PROCEDURE — 87486 CHLMYD PNEUM DNA AMP PROBE: CPT

## 2020-03-09 PROCEDURE — 87040 BLOOD CULTURE FOR BACTERIA: CPT

## 2020-03-09 PROCEDURE — 99291 CRITICAL CARE FIRST HOUR: CPT

## 2020-03-09 PROCEDURE — 76705 ECHO EXAM OF ABDOMEN: CPT

## 2020-03-09 RX ORDER — AMOXICILLIN 250 MG
2 CAPSULE ORAL 2 TIMES DAILY
Status: DISCONTINUED | OUTPATIENT
Start: 2020-03-09 | End: 2020-03-11 | Stop reason: HOSPADM

## 2020-03-09 RX ORDER — ACETAMINOPHEN 500 MG
500 TABLET ORAL EVERY 6 HOURS PRN
Status: DISCONTINUED | OUTPATIENT
Start: 2020-03-09 | End: 2020-03-11 | Stop reason: HOSPADM

## 2020-03-09 RX ORDER — SODIUM CHLORIDE 9 MG/ML
INJECTION, SOLUTION INTRAVENOUS CONTINUOUS
Status: DISCONTINUED | OUTPATIENT
Start: 2020-03-09 | End: 2020-03-11

## 2020-03-09 RX ORDER — POLYETHYLENE GLYCOL 3350 17 G/17G
1 POWDER, FOR SOLUTION ORAL
Status: DISCONTINUED | OUTPATIENT
Start: 2020-03-09 | End: 2020-03-11 | Stop reason: HOSPADM

## 2020-03-09 RX ORDER — BISACODYL 10 MG
10 SUPPOSITORY, RECTAL RECTAL
Status: DISCONTINUED | OUTPATIENT
Start: 2020-03-09 | End: 2020-03-11 | Stop reason: HOSPADM

## 2020-03-09 ASSESSMENT — FIBROSIS 4 INDEX: FIB4 SCORE: 0.94

## 2020-03-09 NOTE — ED PROVIDER NOTES
"ED Provider Note    CHIEF COMPLAINT  Chief Complaint   Patient presents with   • Flu Like Symptoms     pt complaining of flu like symptoms x's 10 days. [place] stated he sat next to a man who had just gotten off a cruise ship from [place]. Pt reports he has HIV and has been off his meds for a year. Pt reports he had a fever this am of 103 and took aspirin and temperature has resolved        HPI  Vincent Riojas is a 49 y.o. HIV positive male who presents complaining of feeling fatigued with a cough for the last 2 weeks.  One week ago, his cough became more productive of sputum \"that is all kinds of colors.\"  He reports shortness of breath.  He denies chest pain, rash, vomiting, diarrhea.  He reports a 30 pound weight loss because he has no appetite and has not been eating.  Patient has not taken his HIV medications in 1 year.  Patient is concerned as he [place] [facility] who [facility] and had been on a cruise trip in [place] 2 weeks ago.  The man was not obviously ill.  Patient states he took his temperature this morning and had a fever of 103.      ALLERGIES  Allergies   Allergen Reactions   • Hydrocodone-Acetaminophen Vomiting   • Peanut-Derived Anaphylaxis   • Penicillins Hives     Patient tolerated Ancef 08/13/15       CURRENT MEDICATIONS  Home Medications     Reviewed by Ary Marquez R.N. (Registered Nurse) on 03/09/20 at 1318  Med List Status: Not Addressed   Medication Last Dose Status   Abacavir-Dolutegravir-Lamivud (TRIUMEQ) 600- MG Tab  Active   diazepam (VALIUM) 2 MG Tab  Active   diclofenac EC (VOLTAREN) 75 MG Tablet Delayed Response  Active   glimepiride (AMARYL) 2 MG Tab  Active   insulin glargine (LANTUS) 100 UNIT/ML Solution  Active   metformin (GLUCOPHAGE) 1000 MG tablet  Active   methocarbamol (ROBAXIN) 750 MG Tab  Active   morphine SR (MS CONTIN) 15 MG Tab CR tablet  Active   omeprazole (PRILOSEC) 20 MG delayed-release capsule  Active " "  zolpidem (AMBIEN) 5 MG TABS  Active                PAST MEDICAL HISTORY   has a past medical history of AIDS (acquired immune deficiency syndrome) (HCC), Brain tumor (HCC), CAD (coronary artery disease), Cancer (HCC), Depression, Diabetes mellitus, HIV (human immunodeficiency virus infection) (HCC), HIV (human immunodeficiency virus infection) (HCC), Lymphoma (HCC), and Myocardial infarct (HCC).    SURGICAL HISTORY  patient denies any surgical history    SOCIAL HISTORY  Social History     Tobacco Use   • Smoking status: Current Every Day Smoker     Packs/day: 0.50     Types: Cigarettes     Last attempt to quit: 2015     Years since quittin.5   • Smokeless tobacco: Never Used   Substance and Sexual Activity   • Alcohol use: No     Comment: socially   • Drug use: No     Types: Intravenous     Comment: hx of. currently clean   • Sexual activity: Not on file     Denies IV drug use    REVIEW OF SYSTEMS  See HPI for further details.  All other systems are negative except as above in HPI.      PHYSICAL EXAM  VITAL SIGNS: /73   Pulse 90   Temp 36.2 °C (97.2 °F) (Temporal)   Resp 16   Ht 1.854 m (6' 1\")   Wt 73.4 kg (161 lb 13.1 oz)   SpO2 95%   BMI 21.35 kg/m²     General:  WD thin nontoxic appearing in NAD; A+Ox3; V/S as above; afebrile; persistent cough  Skin: warm and dry; good color; no rash  HEENT: NCAT; EOMs intact; PERRL; no scleral icterus; posterior pharynx erythematous without exudate, thrush, trismus, or drooling  Neck: FROM; no meningismus, no LAD; no stridor  Cardiovascular: Regular heart rate and rhythm.  No murmurs, rubs, or gallops; pulses 2+ bilaterally radially and DP areas  Lungs: Clear to auscultation with good air movement bilaterally.  No wheezes, rhonchi, or rales.   Abdomen: BS present; soft; NTND; no rebound, guarding, or rigidity.  No organomegaly or pulsatile mass; no CVAT   Extremities: SUTTON x 4; no e/o trauma; no pedal edema; neg Dixon's  Neurologic: CNs III-XII grossly " intact; speech clear; distal sensation intact; strength 5/5 UE/LEs  Psychiatric: Appropriate affect, normal mood    LABS  Results for orders placed or performed during the hospital encounter of 03/09/20   Influenza A/B By PCR (Adult - Flu Only)   Result Value Ref Range    Influenza virus A RNA Negative Negative    Influenza virus B, PCR Negative Negative   CBC WITH DIFFERENTIAL   Result Value Ref Range    WBC 3.5 (L) 4.8 - 10.8 K/uL    RBC 5.35 4.70 - 6.10 M/uL    Hemoglobin 14.3 14.0 - 18.0 g/dL    Hematocrit 40.7 (L) 42.0 - 52.0 %    MCV 76.1 (L) 81.4 - 97.8 fL    MCH 26.7 (L) 27.0 - 33.0 pg    MCHC 35.1 33.7 - 35.3 g/dL    RDW 35.7 (L) 35.9 - 50.0 fL    Platelet Count 186 164 - 446 K/uL    MPV 10.4 9.0 - 12.9 fL    Neutrophils-Polys 52.40 44.00 - 72.00 %    Lymphocytes 31.90 22.00 - 41.00 %    Monocytes 13.30 0.00 - 13.40 %    Eosinophils 0.90 0.00 - 6.90 %    Basophils 0.60 0.00 - 1.80 %    Immature Granulocytes 0.90 0.00 - 0.90 %    Nucleated RBC 0.00 /100 WBC    Neutrophils (Absolute) 1.81 (L) 1.82 - 7.42 K/uL    Lymphs (Absolute) 1.10 1.00 - 4.80 K/uL    Monos (Absolute) 0.46 0.00 - 0.85 K/uL    Eos (Absolute) 0.03 0.00 - 0.51 K/uL    Baso (Absolute) 0.02 0.00 - 0.12 K/uL    Immature Granulocytes (abs) 0.03 0.00 - 0.11 K/uL    NRBC (Absolute) 0.00 K/uL   CMP   Result Value Ref Range    Sodium 127 (L) 135 - 145 mmol/L    Potassium 4.6 3.6 - 5.5 mmol/L    Chloride 95 (L) 96 - 112 mmol/L    Co2 26 20 - 33 mmol/L    Anion Gap 6.0 0.0 - 11.9    Glucose 420 (H) 65 - 99 mg/dL    Bun 20 8 - 22 mg/dL    Creatinine 0.81 0.50 - 1.40 mg/dL    Calcium 9.3 8.5 - 10.5 mg/dL    AST(SGOT) 52 (H) 12 - 45 U/L    ALT(SGPT) 74 (H) 2 - 50 U/L    Alkaline Phosphatase 614 (H) 30 - 99 U/L    Total Bilirubin 0.6 0.1 - 1.5 mg/dL    Albumin 3.4 3.2 - 4.9 g/dL    Total Protein 7.7 6.0 - 8.2 g/dL    Globulin 4.3 (H) 1.9 - 3.5 g/dL    A-G Ratio 0.8 g/dL   Group A Strep by PCR   Result Value Ref Range    Group A Strep by PCR Not Detected  Not Detected   ESTIMATED GFR   Result Value Ref Range    GFR If African American >60 >60 mL/min/1.73 m 2    GFR If Non African American >60 >60 mL/min/1.73 m 2       IMAGING  US-RUQ   Final Result      No acute sonographic abnormality. No gallstones.      DX-CHEST-PORTABLE (1 VIEW)   Final Result      No acute cardiopulmonary abnormality.            MEDICAL RECORD  I have reviewed patient's medical record and pertinent results are listed below.      COURSE & MEDICAL DECISION MAKING  I have reviewed any medical record information, laboratory studies and radiographic results as noted.    Vincent Riojas is a 49 y.o. HIV positive male who presents complaining of respiratory symptoms and contact with a person who had recently been in [place] but was not ill.  Patient is afebrile here.  Not hypoxic.    Patient was seen while wearing an N95 mask and goggles/protective gear.  No adventitious breath sounds were appreciated.    Chest x-ray demonstrates no acute pathology.  Right upper quadrant ultrasound was obtained given the elevated alk phos.  This is concerning to me for bony metastases especially in conjunction with a significant weight loss reported by the patient.    Labs demonstrate no UTI, negative flu, negative strep, white blood cell count 3.5 without neutropenia, sodium low at 127, glucose high at 420.    5:07 PM  I discussed the case with the hospitalist who agrees to hospitalize the patient.  He requests that a respiratory panel be initiated.      FINAL IMPRESSION  Upper respiratory illness  Hyperglycemia  Elevated alk phos  Transaminitis  Hyponatremia  HIV       Electronically signed by: Sindi Candelario M.D., 3/9/2020 2:00 PM

## 2020-03-09 NOTE — ED TRIAGE NOTES
..  Chief Complaint   Patient presents with   • Flu Like Symptoms     pt complaining of flu like symptoms x's 10 days. Pt was at the [facility] about two weeks ago and stated he sat next to a man who had just gotten off a cruise ship from [place]. Pt reports he has HIV and has been off his meds for a year. Pt reports he had a fever this am of 103 and took aspirin and temperature has resolved    ..  Vitals:    03/09/20 1252   BP: 147/90   Pulse: (!) 103   Resp: 16   Temp: 36.2 °C (97.2 °F)   SpO2: 96%   ...pt placed in negative pressure isolation room 24 charge nurse notified

## 2020-03-10 PROBLEM — R74.8 ELEVATED ALKALINE PHOSPHATASE LEVEL: Status: ACTIVE | Noted: 2020-03-10

## 2020-03-10 PROBLEM — D61.818 PANCYTOPENIA (HCC): Status: ACTIVE | Noted: 2020-03-10

## 2020-03-10 PROBLEM — R50.9 FEVER: Status: ACTIVE | Noted: 2020-03-10

## 2020-03-10 LAB
ALBUMIN SERPL BCP-MCNC: 2.9 G/DL (ref 3.2–4.9)
ALBUMIN/GLOB SERPL: 0.7 G/DL
ALP SERPL-CCNC: 555 U/L (ref 30–99)
ALT SERPL-CCNC: 67 U/L (ref 2–50)
ANION GAP SERPL CALC-SCNC: 6 MMOL/L (ref 0–11.9)
ANISOCYTOSIS BLD QL SMEAR: ABNORMAL
AST SERPL-CCNC: 42 U/L (ref 12–45)
BASOPHILS # BLD AUTO: 0.9 % (ref 0–1.8)
BASOPHILS # BLD: 0.03 K/UL (ref 0–0.12)
BILIRUB SERPL-MCNC: 0.5 MG/DL (ref 0.1–1.5)
BUN SERPL-MCNC: 19 MG/DL (ref 8–22)
CALCIUM SERPL-MCNC: 8.8 MG/DL (ref 8.5–10.5)
CHLORIDE SERPL-SCNC: 96 MMOL/L (ref 96–112)
CO2 SERPL-SCNC: 27 MMOL/L (ref 20–33)
CREAT SERPL-MCNC: 0.74 MG/DL (ref 0.5–1.4)
EOSINOPHIL # BLD AUTO: 0.08 K/UL (ref 0–0.51)
EOSINOPHIL NFR BLD: 2.6 % (ref 0–6.9)
ERYTHROCYTE [DISTWIDTH] IN BLOOD BY AUTOMATED COUNT: 35.7 FL (ref 35.9–50)
EST. AVERAGE GLUCOSE BLD GHB EST-MCNC: 338 MG/DL
GLOBULIN SER CALC-MCNC: 3.9 G/DL (ref 1.9–3.5)
GLUCOSE BLD-MCNC: 212 MG/DL (ref 65–99)
GLUCOSE BLD-MCNC: 281 MG/DL (ref 65–99)
GLUCOSE BLD-MCNC: 307 MG/DL (ref 65–99)
GLUCOSE BLD-MCNC: 371 MG/DL (ref 65–99)
GLUCOSE BLD-MCNC: 452 MG/DL (ref 65–99)
GLUCOSE SERPL-MCNC: 331 MG/DL (ref 65–99)
HBA1C MFR BLD: 13.4 % (ref 0–5.6)
HCT VFR BLD AUTO: 37.9 % (ref 42–52)
HGB BLD-MCNC: 13.2 G/DL (ref 14–18)
LYMPHOCYTES # BLD AUTO: 0.78 K/UL (ref 1–4.8)
LYMPHOCYTES NFR BLD: 26.9 % (ref 22–41)
MANUAL DIFF BLD: NORMAL
MCH RBC QN AUTO: 26.7 PG (ref 27–33)
MCHC RBC AUTO-ENTMCNC: 34.8 G/DL (ref 33.7–35.3)
MCV RBC AUTO: 76.7 FL (ref 81.4–97.8)
MICROCYTES BLD QL SMEAR: ABNORMAL
MONOCYTES # BLD AUTO: 0.28 K/UL (ref 0–0.85)
MONOCYTES NFR BLD AUTO: 9.6 % (ref 0–13.4)
MORPHOLOGY BLD-IMP: NORMAL
NEUTROPHILS # BLD AUTO: 1.74 K/UL (ref 1.82–7.42)
NEUTROPHILS NFR BLD: 60 % (ref 44–72)
NRBC # BLD AUTO: 0 K/UL
NRBC BLD-RTO: 0 /100 WBC
PLATELET # BLD AUTO: 162 K/UL (ref 164–446)
PLATELET BLD QL SMEAR: NORMAL
PMV BLD AUTO: 10.7 FL (ref 9–12.9)
POTASSIUM SERPL-SCNC: 4.2 MMOL/L (ref 3.6–5.5)
PROT SERPL-MCNC: 6.8 G/DL (ref 6–8.2)
RBC # BLD AUTO: 4.94 M/UL (ref 4.7–6.1)
RBC BLD AUTO: PRESENT
SODIUM SERPL-SCNC: 129 MMOL/L (ref 135–145)
VARIANT LYMPHS BLD QL SMEAR: NORMAL
WBC # BLD AUTO: 2.9 K/UL (ref 4.8–10.8)

## 2020-03-10 PROCEDURE — A9270 NON-COVERED ITEM OR SERVICE: HCPCS | Performed by: INTERNAL MEDICINE

## 2020-03-10 PROCEDURE — 80053 COMPREHEN METABOLIC PANEL: CPT

## 2020-03-10 PROCEDURE — 85027 COMPLETE CBC AUTOMATED: CPT

## 2020-03-10 PROCEDURE — 700102 HCHG RX REV CODE 250 W/ 637 OVERRIDE(OP): Performed by: INTERNAL MEDICINE

## 2020-03-10 PROCEDURE — 86360 T CELL ABSOLUTE COUNT/RATIO: CPT

## 2020-03-10 PROCEDURE — 83036 HEMOGLOBIN GLYCOSYLATED A1C: CPT

## 2020-03-10 PROCEDURE — 82962 GLUCOSE BLOOD TEST: CPT

## 2020-03-10 PROCEDURE — A9270 NON-COVERED ITEM OR SERVICE: HCPCS | Performed by: HOSPITALIST

## 2020-03-10 PROCEDURE — 700105 HCHG RX REV CODE 258: Performed by: HOSPITALIST

## 2020-03-10 PROCEDURE — 86359 T CELLS TOTAL COUNT: CPT

## 2020-03-10 PROCEDURE — 96365 THER/PROPH/DIAG IV INF INIT: CPT

## 2020-03-10 PROCEDURE — 770020 HCHG ROOM/CARE - TELE (206)

## 2020-03-10 PROCEDURE — 99239 HOSP IP/OBS DSCHRG MGMT >30: CPT | Performed by: HOSPITALIST

## 2020-03-10 PROCEDURE — 96372 THER/PROPH/DIAG INJ SC/IM: CPT

## 2020-03-10 PROCEDURE — 700102 HCHG RX REV CODE 250 W/ 637 OVERRIDE(OP): Performed by: HOSPITALIST

## 2020-03-10 PROCEDURE — 85007 BL SMEAR W/DIFF WBC COUNT: CPT

## 2020-03-10 PROCEDURE — 700111 HCHG RX REV CODE 636 W/ 250 OVERRIDE (IP): Performed by: INTERNAL MEDICINE

## 2020-03-10 PROCEDURE — 700105 HCHG RX REV CODE 258: Performed by: INTERNAL MEDICINE

## 2020-03-10 RX ORDER — IBUPROFEN 800 MG/1
400 TABLET ORAL EVERY 6 HOURS PRN
Status: DISCONTINUED | OUTPATIENT
Start: 2020-03-10 | End: 2020-03-11 | Stop reason: HOSPADM

## 2020-03-10 RX ORDER — SODIUM CHLORIDE 9 MG/ML
500 INJECTION, SOLUTION INTRAVENOUS ONCE
Status: COMPLETED | OUTPATIENT
Start: 2020-03-10 | End: 2020-03-10

## 2020-03-10 RX ORDER — INSULIN GLARGINE 100 [IU]/ML
15 INJECTION, SOLUTION SUBCUTANEOUS EVERY EVENING
Status: DISCONTINUED | OUTPATIENT
Start: 2020-03-10 | End: 2020-03-11

## 2020-03-10 RX ORDER — GABAPENTIN 300 MG/1
300 CAPSULE ORAL EVERY EVENING
Status: DISCONTINUED | OUTPATIENT
Start: 2020-03-10 | End: 2020-03-11 | Stop reason: HOSPADM

## 2020-03-10 RX ADMIN — INSULIN HUMAN 8 UNITS: 100 INJECTION, SOLUTION PARENTERAL at 17:47

## 2020-03-10 RX ADMIN — CEFTRIAXONE SODIUM 2 G: 2 INJECTION, POWDER, FOR SOLUTION INTRAMUSCULAR; INTRAVENOUS at 06:14

## 2020-03-10 RX ADMIN — INSULIN HUMAN 6 UNITS: 100 INJECTION, SOLUTION PARENTERAL at 01:06

## 2020-03-10 RX ADMIN — SODIUM CHLORIDE: 9 INJECTION, SOLUTION INTRAVENOUS at 01:18

## 2020-03-10 RX ADMIN — SODIUM CHLORIDE: 9 INJECTION, SOLUTION INTRAVENOUS at 12:58

## 2020-03-10 RX ADMIN — INSULIN HUMAN 5 UNITS: 100 INJECTION, SOLUTION PARENTERAL at 20:57

## 2020-03-10 RX ADMIN — GABAPENTIN 300 MG: 300 CAPSULE ORAL at 01:15

## 2020-03-10 RX ADMIN — INSULIN GLARGINE 15 UNITS: 100 INJECTION, SOLUTION SUBCUTANEOUS at 17:47

## 2020-03-10 RX ADMIN — ENOXAPARIN SODIUM 40 MG: 100 INJECTION SUBCUTANEOUS at 06:15

## 2020-03-10 RX ADMIN — GABAPENTIN 300 MG: 300 CAPSULE ORAL at 23:09

## 2020-03-10 RX ADMIN — INSULIN HUMAN 4 UNITS: 100 INJECTION, SOLUTION PARENTERAL at 13:04

## 2020-03-10 RX ADMIN — INSULIN HUMAN 2 UNITS: 100 INJECTION, SOLUTION PARENTERAL at 08:51

## 2020-03-10 RX ADMIN — SODIUM CHLORIDE 500 ML: 9 INJECTION, SOLUTION INTRAVENOUS at 22:30

## 2020-03-10 RX ADMIN — IBUPROFEN 400 MG: 800 TABLET, FILM COATED ORAL at 14:11

## 2020-03-10 ASSESSMENT — COGNITIVE AND FUNCTIONAL STATUS - GENERAL
MOBILITY SCORE: 24
SUGGESTED CMS G CODE MODIFIER DAILY ACTIVITY: CH
SUGGESTED CMS G CODE MODIFIER MOBILITY: CH
DAILY ACTIVITIY SCORE: 24

## 2020-03-10 ASSESSMENT — ENCOUNTER SYMPTOMS
SENSORY CHANGE: 0
EYE PAIN: 0
PALPITATIONS: 0
CHILLS: 0
NECK PAIN: 0
ABDOMINAL PAIN: 0
BLURRED VISION: 0
HEADACHES: 0
TREMORS: 0
NAUSEA: 0
DOUBLE VISION: 0
FOCAL WEAKNESS: 0
VOMITING: 0
DIZZINESS: 0
PHOTOPHOBIA: 0
SPEECH CHANGE: 0
ORTHOPNEA: 0
SPUTUM PRODUCTION: 1
HALLUCINATIONS: 0
FEVER: 1
WEIGHT LOSS: 0
MYALGIAS: 0
BACK PAIN: 0
TINGLING: 0
ROS GI COMMENTS: GOOD APPETITE
DIARRHEA: 0
SHORTNESS OF BREATH: 1
CONSTIPATION: 0
COUGH: 1

## 2020-03-10 ASSESSMENT — LIFESTYLE VARIABLES
TOTAL SCORE: 0
SUBSTANCE_ABUSE: 0
ALCOHOL_USE: NO
EVER FELT BAD OR GUILTY ABOUT YOUR DRINKING: NO
HAVE PEOPLE ANNOYED YOU BY CRITICIZING YOUR DRINKING: NO
TOTAL SCORE: 0
CONSUMPTION TOTAL: INCOMPLETE
TOTAL SCORE: 0
EVER HAD A DRINK FIRST THING IN THE MORNING TO STEADY YOUR NERVES TO GET RID OF A HANGOVER: NO
HAVE YOU EVER FELT YOU SHOULD CUT DOWN ON YOUR DRINKING: NO

## 2020-03-10 NOTE — ASSESSMENT & PLAN NOTE
He found to have a markedly elevated alkaline phosphatase level.  He underwent ultrasound upper quadrant and did not show any acute abnormalities.  Isozymes ordered

## 2020-03-10 NOTE — H&P
Hospital Medicine History & Physical Note    Date of Service  3/9/2020    Primary Care Physician  Miladys Sanchez P.A.-C.    Consultants  None    Code Status  Full code    Chief Complaint  Shortness of breath, cough and fever for 2-week    History of Presenting Illness    49 y.o. male with past medical history of AIDS and who presented hospital on March 9, 2020 with complaint of shortness of breath, cough and fever for 2 weeks.  He reported that he has been having these symptoms for the last 2 weeks and he met someone in [facility] and he was on cruise and he traveled to [place] as well.  He reported after meeting with him for the last 2 weeks he has been having shortness of breath, productive cough and he is bringing up yellow and green-colored phlegm.  He also developed fever for the last 1 week and this morning he reported he had a fever of 103.  He also expressed that his mom is also sick at home.  He denies any recent traveling also denies that his mother also does not have any travel history.  There is no specific aggravating or alleviating factor.  Denies any other acute complaints of nausea, vomiting, diarrhea, constipation and dysuria.  He is a current smoker and he expressed that for the last 2 weeks he has not been smoking much.  Medical decision less than what completed and he has not been taking any medication other than diclofenac 75 mg twice daily and his last refill was on February 3, 2020.        Review of Systems  Review of Systems   Constitutional: Positive for fever and malaise/fatigue. Negative for chills and weight loss.   HENT: Negative for hearing loss and tinnitus.    Eyes: Negative for blurred vision, double vision, photophobia and pain.   Respiratory: Positive for cough, sputum production and shortness of breath.    Cardiovascular: Negative for chest pain, palpitations, orthopnea and leg swelling.   Gastrointestinal: Negative for abdominal pain, constipation, diarrhea, nausea and vomiting.    Genitourinary: Negative for dysuria, frequency and urgency.   Musculoskeletal: Negative for back pain, joint pain, myalgias and neck pain.   Skin: Negative for rash.   Neurological: Negative for dizziness, tingling, tremors, sensory change, speech change, focal weakness and headaches.   Psychiatric/Behavioral: Negative for hallucinations and substance abuse.   All other systems reviewed and are negative.      Past Medical History   has a past medical history of AIDS (acquired immune deficiency syndrome) (HCC), Brain tumor (HCC), CAD (coronary artery disease), Cancer (HCC), Depression, Diabetes mellitus, HIV (human immunodeficiency virus infection) (HCC), HIV (human immunodeficiency virus infection) (HCC), Lymphoma (HCC), and Myocardial infarct (HCC).    Surgical History  I reviewed surgical history with patient and he reported he has history of tumor removal from the skin.    Family History  I reviewed family history with patient not pertinent to his current condition.    Social History   reports that he has been smoking cigarettes. He has been smoking about 0.50 packs per day. He has never used smokeless tobacco. He reports that he does not drink alcohol or use drugs.    Allergies  Allergies   Allergen Reactions   • Hydrocodone-Acetaminophen Vomiting   • Peanut-Derived Anaphylaxis   • Penicillins Hives     Patient tolerated Anc 08/13/15       Medications  None       Physical Exam  Temp:  [36.2 °C (97.2 °F)] 36.2 °C (97.2 °F)  Pulse:  [] 92  Resp:  [16] 16  BP: (103-147)/(48-90) 122/74  SpO2:  [94 %-97 %] 94 %    Physical Exam  Vitals signs reviewed.   Constitutional:       General: He is not in acute distress.     Appearance: He is ill-appearing.   HENT:      Head: Normocephalic and atraumatic. No contusion.      Right Ear: External ear normal.      Left Ear: External ear normal.      Nose: Nose normal.      Mouth/Throat:      Mouth: Mucous membranes are moist.      Pharynx: No oropharyngeal exudate.    Eyes:      General:         Right eye: No discharge.         Left eye: No discharge.      Pupils: Pupils are equal, round, and reactive to light.   Neck:      Musculoskeletal: No neck rigidity or muscular tenderness.   Cardiovascular:      Rate and Rhythm: Normal rate and regular rhythm.      Heart sounds: No murmur. No friction rub. No gallop.    Pulmonary:      Effort: Pulmonary effort is normal.      Breath sounds: Wheezing and rhonchi present.   Abdominal:      General: Bowel sounds are normal. There is no distension.      Palpations: Abdomen is soft.      Tenderness: There is no abdominal tenderness. There is no rebound.   Musculoskeletal: Normal range of motion.         General: No swelling or tenderness.   Skin:     General: Skin is warm and dry.      Coloration: Skin is not jaundiced.   Neurological:      General: No focal deficit present.      Mental Status: He is alert.      Cranial Nerves: No cranial nerve deficit.      Sensory: No sensory deficit.   Psychiatric:         Mood and Affect: Mood normal.         Laboratory:  Recent Labs     03/09/20  1337   WBC 3.5*   RBC 5.35   HEMOGLOBIN 14.3   HEMATOCRIT 40.7*   MCV 76.1*   MCH 26.7*   MCHC 35.1   RDW 35.7*   PLATELETCT 186   MPV 10.4     Recent Labs     03/09/20  1337   SODIUM 127*   POTASSIUM 4.6   CHLORIDE 95*   CO2 26   GLUCOSE 420*   BUN 20   CREATININE 0.81   CALCIUM 9.3     Recent Labs     03/09/20  1337   ALTSGPT 74*   ASTSGOT 52*   ALKPHOSPHAT 614*   TBILIRUBIN 0.6   GLUCOSE 420*         No results for input(s): NTPROBNP in the last 72 hours.      No results for input(s): TROPONINT in the last 72 hours.    Urinalysis:    Recent Labs     03/09/20  1749   SPECGRAVITY 1.041   GLUCOSEUR >=1000*   KETONES Negative   NITRITE Negative   LEUKESTERAS Negative   WBCURINE 0-2*   RBCURINE 0-2*   BACTERIA Negative   EPITHELCELL Negative        Imaging:  US-RUQ   Final Result      No acute sonographic abnormality. No gallstones.      DX-CHEST-PORTABLE (1  VIEW)   Final Result      No acute cardiopulmonary abnormality.            Assessment/Plan:  I anticipate this patient will require at least two midnights for appropriate medical management, necessitating inpatient admission.    Elevated alkaline phosphatase level  Assessment & Plan  He found to have elevated alkaline phosphatase level.  He underwent ultrasound upper quadrant and did not show any acute abnormalities.  He does not have any significant abdominal tenderness.      Fever  Assessment & Plan  He has been having symptoms of fever and productive cough.  His chest x-ray did not show any acute abnormalities per  Blood cultures were ordered.  He has penicillin listed as allergy and he tolerated Ancef before I started him on ceftriaxone.  Flu testing came back negative.  Respiratory panel PCR was ordered.  I ordered procalcitonin level.  Currently airborne precaution due to possible history of exposure to someone who traveled to [place].    Hyponatremia- (present on admission)  Assessment & Plan  He found to have significant hyponatremia.  It could be due to pseudohyponatremia due to elevated blood glucose.  It could be due to hypovolemic hyponatremia.  Admit to the hospital.  Continue to monitor.  Ordered urine electrolytes and urine osmolarity.  Legionella can cause hyponatremia as well as pneumonia but he has chronic hyponatremia  Continue IV fluid    HIV (human immunodeficiency virus infection) ([facility])- (present on admission)  Assessment & Plan  I ordered CD4/CD8 count.      DM (diabetes mellitus) ([facility])- (present on admission)  Assessment & Plan  I started him on some sliding scale with hypoglycemia protocol.  I ordered HbA1c   I ordered diabetic education      VTE prophylaxis: Lovenox

## 2020-03-10 NOTE — ASSESSMENT & PLAN NOTE
He has been having symptoms of fever and productive cough.  His chest x-ray did not show any acute abnormalities   Blood cultures were ordered and negative.  Empiric IV ceftriaxone was initiated and will be stopped as there is no apparent source of bacterial infection  Urinalysis was negative  Flu testing came back negative.  Respiratory panel PCR negative  Corona Virus was negative  He is immunosuppressed due to HIV off meds over one year.

## 2020-03-10 NOTE — PROGRESS NOTES
Dr. Egan paged for increased HR and temp 102.9. Pt refused Tylenol. States he has an allergy and it causes hives.      1130 Dr Egan returned MD jaz to come to unit

## 2020-03-10 NOTE — ASSESSMENT & PLAN NOTE
Source of this is unclear  Await his viral load  Nutrition may play a factor in this  Will need outpatient CBC with differential followed up at the [facility]

## 2020-03-10 NOTE — ED NOTES
Med rec complete per [facility] pharmacy- pt last filled diclofenac 75 mg BID for 30 days on 2/3/20. No other medications have been filled per staff

## 2020-03-10 NOTE — ASSESSMENT & PLAN NOTE
He found to have significant hyponatremia with Na 127.  It could be due to pseudohyponatremia due to elevated blood glucose.  It could be due to hypovolemic hyponatremia.  IV saline was given

## 2020-03-10 NOTE — PROGRESS NOTES
Hospital Medicine Daily Progress Note    Date of Service  3/10/2020    Chief Complaint  49 y.o. male admitted 3/9/2020 with cough and shortness of breath.    Hospital Course    Mr. Riojas has a history of HIV with recent exposure to a person who traveled to [place] and was on a cruise that began to experience fever and productive cough that has been progressive over the past 2 weeks. He was admitted to the ICU under Coronavirus isolation.        Interval Problem Update  3/10: patient seen and evaluated in the ICU. Mr. Riojas overall feels very poorly. He has had a mild cough. He has been off his HIV meds for over a year though cannot endorse why. He is interested in getting back on meds and is open to meeting with Dr. Velazquez.     Consultants/Specialty  I have contacted Dr. Velazquez, ID for consult    Code Status  full    Disposition  ICU    Review of Systems  Review of Systems   Constitutional: Positive for fever and malaise/fatigue. Negative for chills.   Respiratory: Positive for cough.    Cardiovascular: Negative for chest pain.   Gastrointestinal: Negative for diarrhea.        Good appetite    Genitourinary: Negative for dysuria.        Physical Exam  Temp:  [36.8 °C (98.3 °F)-39.4 °C (102.9 °F)] 39.4 °C (102.9 °F)  Pulse:  [] 125  Resp:  [8-24] 19  BP: ()/(48-93) 131/62  SpO2:  [82 %-97 %] 95 %    Physical Exam  Vitals signs and nursing note reviewed.   Constitutional:       Appearance: He is ill-appearing. He is not toxic-appearing.   HENT:      Head:      Comments: Temporal wasting     Mouth/Throat:      Mouth: Mucous membranes are dry.      Pharynx: Oropharynx is clear.   Eyes:      General: No scleral icterus.     Conjunctiva/sclera: Conjunctivae normal.   Neck:      Musculoskeletal: Normal range of motion and neck supple.   Cardiovascular:      Comments: Sinus tachycardia  No murmur  Pulmonary:      Effort: Pulmonary effort is normal. No respiratory distress.      Breath sounds: Normal breath  sounds.   Abdominal:      General: Abdomen is flat. There is no distension.   Musculoskeletal:      Right lower leg: No edema.      Left lower leg: No edema.   Skin:     General: Skin is warm and dry.   Neurological:      General: No focal deficit present.      Mental Status: He is alert and oriented to person, place, and time.   Psychiatric:         Mood and Affect: Mood normal.         Behavior: Behavior normal.         Fluids    Intake/Output Summary (Last 24 hours) at 3/10/2020 1308  Last data filed at 3/10/2020 1200  Gross per 24 hour   Intake 1650 ml   Output --   Net 1650 ml       Laboratory  Recent Labs     03/09/20  1337 03/10/20  0238   WBC 3.5* 2.9*   RBC 5.35 4.94   HEMOGLOBIN 14.3 13.2*   HEMATOCRIT 40.7* 37.9*   MCV 76.1* 76.7*   MCH 26.7* 26.7*   MCHC 35.1 34.8   RDW 35.7* 35.7*   PLATELETCT 186 162*   MPV 10.4 10.7     Recent Labs     03/09/20  1337 03/10/20  0238   SODIUM 127* 129*   POTASSIUM 4.6 4.2   CHLORIDE 95* 96   CO2 26 27   GLUCOSE 420* 331*   BUN 20 19   CREATININE 0.81 0.74   CALCIUM 9.3 8.8                   Imaging  US-RUQ   Final Result      No acute sonographic abnormality. No gallstones.      DX-CHEST-PORTABLE (1 VIEW)   Final Result      No acute cardiopulmonary abnormality.           Assessment/Plan  * Fever  Assessment & Plan  He has been having symptoms of fever and productive cough.  His chest x-ray did not show any acute abnormalities   Blood cultures were ordered and negative.  Empiric IV ceftriaxone.  Flu testing came back negative.  Respiratory panel PCR negative  Corona Virus precautions due to possible exposure  He is immunosuppressed due to HIV off meds over one year.      Pancytopenia (HCC)- (present on admission)  Assessment & Plan  Follow CBC in the morning    Elevated alkaline phosphatase level  Assessment & Plan  He found to have a markedly elevated alkaline phosphatase level.  He underwent ultrasound upper quadrant and did not show any acute abnormalities.  Isozymes  ordered      Hyponatremia- (present on admission)  Assessment & Plan  He found to have significant hyponatremia with Na 127.  It could be due to pseudohyponatremia due to elevated blood glucose.  It could be due to hypovolemic hyponatremia.  IV saline  BMP ordered for the morning      HIV (human immunodeficiency virus infection) ([facility])- (present on admission)  Assessment & Plan  CD4/CD8 count pending  Dr. Velazquez to see  He is amenable to restarting anti-retroviral meds.      DM (diabetes mellitus) ([facility])- (present on admission)  Assessment & Plan  With markedly elevated blood sugars over 400  sliding scale with hypoglycemia protocol.  Start Lantus 15 units daily  HbA1c pending  Diabetic education ordered prior to discharge       VTE prophylaxis: lovenox

## 2020-03-10 NOTE — DIETARY
"Nutrition services: Day 0 of admit.  Vincent Riojas is a 49 y.o. male with admitting DX of hyponatremia.   Pt noted with a hx risk dx: HIV/AIDS.      Assessment:  Height: 185.4 cm (6' 1\")  Weight: 73.4 kg (161 lb 13.1 oz) - via stand up scale.   Body mass index is 21.35 kg/m²., BMI classification: WNL.   Diet/Intake: Diabetic.      Evaluation:   1. Pt noted with fever, elevated alkaline phosphatase level, and DM.  2. Pt presented with SOB, cough, fever 2 weeks PTA, though no significant GI distress noted (n/v, diarrhea, or constipation).   3. Per chart, pt is consuming % thus far.   4. Labs: Sodium: 129, Glucose: 331, ALT: 67, Alkaline phosphatase: 555.  5. HgbA1C from February 2015 was 7.5%.  New HgbA1C has been ordered - pending.  6. Per chart review, pt weighed 77.1 kg January 2020 and 80.7 kg February 2020 - Wt has fluctuated however noted with a 9% wt loss over the past month, which is considered severe.  7. Pt would benefit from oral nutrition supplements to optimize intake with recent wt loss.   8. Meds: Rocephin (currently stopped), SSI, Senokot.  9. LBM: PTA.     Malnutrition Risk: Pt noted with a severe wt loss however does not meet ASPEN Guideline criteria for malnutrition at this time.     Recommendations/Plan:  1. Boost Glucose Control BID.    2. Encourage intake of meals and supplements.   3. Document intake of all meals and supplements as % taken in ADL's to provide interdisciplinary communication across all shifts.   4. Monitor weight.  5. Nutrition rep will continue to see patient for ongoing meal and snack preferences.   6. Consult received and acknowledged for DM education.  RD to follow-up for DM education prior to discharge as able/appropriate.    RD continues to monitor to ensure sufficient intake and toleration of diet.  "

## 2020-03-10 NOTE — ASSESSMENT & PLAN NOTE
Presenting with hyperglycemia and has had markedly elevated blood sugars over 400  He has been on insulin in the past though decided to stop taking it  HbA1c markedly elevated at 13.4 consistent with very poorly controlled DM  Diabetic education ordered prior to discharge  In order to make this a more simple regimen and less sophisticated, will start NPH insulin 15 units twice a day

## 2020-03-11 VITALS
RESPIRATION RATE: 20 BRPM | SYSTOLIC BLOOD PRESSURE: 121 MMHG | DIASTOLIC BLOOD PRESSURE: 74 MMHG | HEIGHT: 73 IN | OXYGEN SATURATION: 98 % | BODY MASS INDEX: 21.45 KG/M2 | WEIGHT: 161.82 LBS | TEMPERATURE: 98.4 F | HEART RATE: 84 BPM

## 2020-03-11 PROBLEM — R50.9 FEVER: Status: RESOLVED | Noted: 2020-03-10 | Resolved: 2020-03-11

## 2020-03-11 LAB
ALBUMIN SERPL BCP-MCNC: 2.7 G/DL (ref 3.2–4.9)
ALBUMIN/GLOB SERPL: 0.8 G/DL
ALP SERPL-CCNC: 568 U/L (ref 30–99)
ALT SERPL-CCNC: 74 U/L (ref 2–50)
ANION GAP SERPL CALC-SCNC: 3 MMOL/L (ref 0–11.9)
ANNOTATION COMMENT IMP: ABNORMAL
AST SERPL-CCNC: 55 U/L (ref 12–45)
BASOPHILS # BLD AUTO: 0.4 % (ref 0–1.8)
BASOPHILS # BLD: 0.01 K/UL (ref 0–0.12)
BILIRUB SERPL-MCNC: 0.7 MG/DL (ref 0.1–1.5)
BUN SERPL-MCNC: 17 MG/DL (ref 8–22)
CALCIUM SERPL-MCNC: 8.2 MG/DL (ref 8.5–10.5)
CD3 CELLS # BLD: 704 CELLS/UL (ref 570–2400)
CD3+CD4+ CELLS # BLD: 69 CELLS/UL (ref 430–1800)
CD3+CD4+ CELLS/CD3+CD8+ CLL BLD: 0.11 RATIO (ref 0.8–3.9)
CD3+CD8+ CELLS # BLD: 606 CELLS/UL (ref 210–1200)
CHLORIDE SERPL-SCNC: 103 MMOL/L (ref 96–112)
CHLORIDE UR-SCNC: 70 MMOL/L
CO2 SERPL-SCNC: 25 MMOL/L (ref 20–33)
CREAT SERPL-MCNC: 0.63 MG/DL (ref 0.5–1.4)
CREAT UR-MCNC: 63 MG/DL
EOSINOPHIL # BLD AUTO: 0.01 K/UL (ref 0–0.51)
EOSINOPHIL NFR BLD: 0.4 % (ref 0–6.9)
ERYTHROCYTE [DISTWIDTH] IN BLOOD BY AUTOMATED COUNT: 35.9 FL (ref 35.9–50)
GLOBULIN SER CALC-MCNC: 3.3 G/DL (ref 1.9–3.5)
GLUCOSE BLD-MCNC: 286 MG/DL (ref 65–99)
GLUCOSE BLD-MCNC: 297 MG/DL (ref 65–99)
GLUCOSE SERPL-MCNC: 284 MG/DL (ref 65–99)
HCT VFR BLD AUTO: 35.1 % (ref 42–52)
HGB BLD-MCNC: 12.4 G/DL (ref 14–18)
IMM GRANULOCYTES # BLD AUTO: 0.01 K/UL (ref 0–0.11)
IMM GRANULOCYTES NFR BLD AUTO: 0.4 % (ref 0–0.9)
LYMPHOCYTES # BLD AUTO: 0.95 K/UL (ref 1–4.8)
LYMPHOCYTES NFR BLD: 35.7 % (ref 22–41)
MCH RBC QN AUTO: 26.7 PG (ref 27–33)
MCHC RBC AUTO-ENTMCNC: 35.3 G/DL (ref 33.7–35.3)
MCV RBC AUTO: 75.5 FL (ref 81.4–97.8)
MONOCYTES # BLD AUTO: 0.39 K/UL (ref 0–0.85)
MONOCYTES NFR BLD AUTO: 14.7 % (ref 0–13.4)
NEUTROPHILS # BLD AUTO: 1.29 K/UL (ref 1.82–7.42)
NEUTROPHILS NFR BLD: 48.4 % (ref 44–72)
NRBC # BLD AUTO: 0 K/UL
NRBC BLD-RTO: 0 /100 WBC
OSMOLALITY UR: 618 MOSM/KG H2O (ref 300–900)
PLATELET # BLD AUTO: 141 K/UL (ref 164–446)
PMV BLD AUTO: 10.4 FL (ref 9–12.9)
POTASSIUM SERPL-SCNC: 3.9 MMOL/L (ref 3.6–5.5)
PROT SERPL-MCNC: 6 G/DL (ref 6–8.2)
RBC # BLD AUTO: 4.65 M/UL (ref 4.7–6.1)
SARS-COV-2 N GENE SPEC QL NAA N1: NOT DETECTED
SARS-COV-2 N GENE SPEC QL NAA N1: NOT DETECTED
SARS-COV-2 N GENE SPEC QL NAA N2: NOT DETECTED
SARS-COV-2 N GENE SPEC QL NAA N2: NOT DETECTED
SARS-COV-2 RNA RESP QL NAA+PROBE: NOT DETECTED
SARS-COV-2 RNA RESP QL NAA+PROBE: NOT DETECTED
SODIUM SERPL-SCNC: 131 MMOL/L (ref 135–145)
SODIUM UR-SCNC: 80 MMOL/L
SPECIMEN SOURCE: NORMAL
SPECIMEN SOURCE: NORMAL
WBC # BLD AUTO: 2.7 K/UL (ref 4.8–10.8)

## 2020-03-11 PROCEDURE — 99239 HOSP IP/OBS DSCHRG MGMT >30: CPT | Performed by: HOSPITALIST

## 2020-03-11 PROCEDURE — 82570 ASSAY OF URINE CREATININE: CPT

## 2020-03-11 PROCEDURE — 82436 ASSAY OF URINE CHLORIDE: CPT

## 2020-03-11 PROCEDURE — 84075 ASSAY ALKALINE PHOSPHATASE: CPT

## 2020-03-11 PROCEDURE — 82962 GLUCOSE BLOOD TEST: CPT

## 2020-03-11 PROCEDURE — 84300 ASSAY OF URINE SODIUM: CPT

## 2020-03-11 PROCEDURE — 85025 COMPLETE CBC W/AUTO DIFF WBC: CPT

## 2020-03-11 PROCEDURE — 700111 HCHG RX REV CODE 636 W/ 250 OVERRIDE (IP): Performed by: INTERNAL MEDICINE

## 2020-03-11 PROCEDURE — 84080 ASSAY ALKALINE PHOSPHATASES: CPT

## 2020-03-11 PROCEDURE — 700105 HCHG RX REV CODE 258: Performed by: INTERNAL MEDICINE

## 2020-03-11 PROCEDURE — 83935 ASSAY OF URINE OSMOLALITY: CPT

## 2020-03-11 PROCEDURE — 80053 COMPREHEN METABOLIC PANEL: CPT

## 2020-03-11 PROCEDURE — 96372 THER/PROPH/DIAG INJ SC/IM: CPT

## 2020-03-11 PROCEDURE — 700102 HCHG RX REV CODE 250 W/ 637 OVERRIDE(OP): Performed by: HOSPITALIST

## 2020-03-11 RX ORDER — LANCETS 30 GAUGE
EACH MISCELLANEOUS
Qty: 100 EACH | Refills: 0 | Status: SHIPPED | OUTPATIENT
Start: 2020-03-11

## 2020-03-11 RX ORDER — INSULIN GLARGINE 100 [IU]/ML
30 INJECTION, SOLUTION SUBCUTANEOUS EVERY EVENING
Qty: 5 PEN | Refills: 6 | Status: ON HOLD | OUTPATIENT
Start: 2020-03-11 | End: 2021-01-01

## 2020-03-11 RX ORDER — GLUCOSAMINE HCL/CHONDROITIN SU 500-400 MG
CAPSULE ORAL
Qty: 100 EACH | Refills: 0 | Status: SHIPPED | OUTPATIENT
Start: 2020-03-11

## 2020-03-11 RX ADMIN — INSULIN HUMAN 5 UNITS: 100 INJECTION, SOLUTION PARENTERAL at 08:43

## 2020-03-11 RX ADMIN — ENOXAPARIN SODIUM 40 MG: 100 INJECTION SUBCUTANEOUS at 05:25

## 2020-03-11 RX ADMIN — INSULIN HUMAN 5 UNITS: 100 INJECTION, SOLUTION PARENTERAL at 12:24

## 2020-03-11 RX ADMIN — CEFTRIAXONE SODIUM 2 G: 2 INJECTION, POWDER, FOR SOLUTION INTRAMUSCULAR; INTRAVENOUS at 05:25

## 2020-03-11 ASSESSMENT — COGNITIVE AND FUNCTIONAL STATUS - GENERAL
SUGGESTED CMS G CODE MODIFIER DAILY ACTIVITY: CH
SUGGESTED CMS G CODE MODIFIER MOBILITY: CH
DAILY ACTIVITIY SCORE: 24
MOBILITY SCORE: 24

## 2020-03-11 ASSESSMENT — ENCOUNTER SYMPTOMS
DIARRHEA: 0
CHILLS: 0
FEVER: 0
ROS GI COMMENTS: GOOD APPETITE
COUGH: 1

## 2020-03-11 NOTE — PROGRESS NOTES
Hospital Medicine Daily Progress Note    Date of Service  3/11/2020    Chief Complaint  49 y.o. male admitted 3/9/2020 with cough and shortness of breath.    Hospital Course    Mr. Riojas has a history of HIV with recent exposure to a person who traveled to [place] and was on a cruise that began to experience fever and productive cough that has been progressive over the past 2 weeks. He was admitted to the ICU under Coronavirus isolation.        Interval Problem Update  3/10: patient seen and evaluated in the ICU. Mr. Riojas overall feels very poorly. He has had a mild cough. He has been off his HIV meds for over a year though cannot endorse why. He is interested in getting back on meds and is open to meeting with Dr. Velazquez.   3/11: Mr. Riojas was evaluated and examined on the telemetry floor.  Had a long discussion as condition when he in fact is not been taking any insulin for quite some time.  He is amenable to restarting his HIV meds as well as his insulin which he stopped over a year ago for both.  Discussed a less sophisticated options specifically NPH insulin twice a day and he can go to the [facility] clinic for his prescriptions.  His CD4/CD8 count is not back.  Consultants/Specialty  I have contacted Dr. Velazquez, ID for consult    Code Status  full    Disposition  ICU    Review of Systems  Review of Systems   Constitutional: Negative for chills, fever and malaise/fatigue.   Respiratory: Positive for cough.    Cardiovascular: Negative for chest pain.   Gastrointestinal: Negative for diarrhea.        Good appetite    Genitourinary: Negative for dysuria.        Physical Exam  Temp:  [36.7 °C (98 °F)-39.4 °C (102.9 °F)] 36.7 °C (98 °F)  Pulse:  [] 88  Resp:  [13-27] 17  BP: ()/(49-62) 98/62  SpO2:  [90 %-98 %] 94 %    Physical Exam  Vitals signs and nursing note reviewed.   Constitutional:       Appearance: He is not toxic-appearing.      Comments: Thin and chronically ill-appearing   HENT:      Head:       Comments: Temporal wasting     Mouth/Throat:      Mouth: Mucous membranes are moist.      Pharynx: Oropharynx is clear.   Eyes:      General: No scleral icterus.     Conjunctiva/sclera: Conjunctivae normal.   Neck:      Musculoskeletal: Normal range of motion and neck supple.   Cardiovascular:      Comments: Sinus rhythm  No murmur  Pulmonary:      Effort: Pulmonary effort is normal. No respiratory distress.      Breath sounds: Normal breath sounds.   Abdominal:      General: Abdomen is flat. There is no distension.   Musculoskeletal:      Right lower leg: No edema.      Left lower leg: No edema.   Skin:     General: Skin is warm and dry.      Coloration: Skin is pale.      Findings: No rash.   Neurological:      General: No focal deficit present.      Mental Status: He is alert and oriented to person, place, and time.   Psychiatric:         Mood and Affect: Mood normal.         Behavior: Behavior normal.         Thought Content: Thought content normal.         Fluids    Intake/Output Summary (Last 24 hours) at 3/11/2020 0645  Last data filed at 3/11/2020 0400  Gross per 24 hour   Intake 3990 ml   Output 1100 ml   Net 2890 ml       Laboratory  Recent Labs     03/09/20  1337 03/10/20  0238 03/11/20  0415   WBC 3.5* 2.9* 2.7*   RBC 5.35 4.94 4.65*   HEMOGLOBIN 14.3 13.2* 12.4*   HEMATOCRIT 40.7* 37.9* 35.1*   MCV 76.1* 76.7* 75.5*   MCH 26.7* 26.7* 26.7*   MCHC 35.1 34.8 35.3   RDW 35.7* 35.7* 35.9   PLATELETCT 186 162* 141*   MPV 10.4 10.7 10.4     Recent Labs     03/09/20  1337 03/10/20  0238 03/11/20  0415   SODIUM 127* 129* 131*   POTASSIUM 4.6 4.2 3.9   CHLORIDE 95* 96 103   CO2 26 27 25   GLUCOSE 420* 331* 284*   BUN 20 19 17   CREATININE 0.81 0.74 0.63   CALCIUM 9.3 8.8 8.2*                   Imaging  US-RUQ   Final Result      No acute sonographic abnormality. No gallstones.      DX-CHEST-PORTABLE (1 VIEW)   Final Result      No acute cardiopulmonary abnormality.           Assessment/Plan  * Fever  Assessment  & Plan  He has been having symptoms of fever and productive cough.  His chest x-ray did not show any acute abnormalities   Blood cultures were ordered and negative.  Empiric IV ceftriaxone was initiated and will be stopped as there is no apparent source of bacterial infection  Urinalysis was negative  Flu testing came back negative.  Respiratory panel PCR negative  Corona Virus was negative  He is immunosuppressed due to HIV off meds over one year.      Pancytopenia ([facility])- (present on admission)  Assessment & Plan  Source of this is unclear  Await his viral load  Nutrition may play a factor in this  Will need outpatient CBC with differential followed up at the [facility]    Elevated alkaline phosphatase level  Assessment & Plan  He found to have a markedly elevated alkaline phosphatase level.  He underwent ultrasound upper quadrant and did not show any acute abnormalities.  Isozymes ordered      Hyponatremia- (present on admission)  Assessment & Plan  He found to have significant hyponatremia with Na 127.  It could be due to pseudohyponatremia due to elevated blood glucose.  It could be due to hypovolemic hyponatremia.  IV saline was given      HIV (human immunodeficiency virus infection) ([facility])- (present on admission)  Assessment & Plan  CD4/CD8 count pending  Dr. Velazquez to see  He is amenable to restarting anti-retroviral meds.      DM (diabetes mellitus) ([facility])- (present on admission)  Assessment & Plan  Presenting with hyperglycemia and has had markedly elevated blood sugars over 400  He has been on insulin in the past though decided to stop taking it  HbA1c markedly elevated at 13.4 consistent with very poorly controlled DM  Diabetic education ordered prior to discharge  In order to make this a more simple regimen and less sophisticated, will start NPH insulin 15 units twice a day       VTE prophylaxis: lovenox

## 2020-03-11 NOTE — CONSULTS
Diabetes education: Met with Vincent, who has a hx of diabetes on insulin, bu stopped all his medications a year ago. Pt was admitted with blood sugar of 420 and Hg a1c of 13.4%. Pt was to be on NPH 15 unit BID ( not yet started), but prior was on Lantus 15 units pm ( received one dose). Met with pt who had just met with his PCP from [facility] and now he is anxious to go home .  Spoke with him regarding diabetes management, using insulin pens, insulin storage and shelf life. Pt states his mother also has diabetes and that is why he is anxious to go home as he is worried about her. Pt states he has an appointment with the [facility] for this Friday.  Spoke with Dr. Egan and with Meds to beds. Medication to be delivered. Medication for discharged ordered by MD. Awaiting discharge.

## 2020-03-11 NOTE — DISCHARGE INSTRUCTIONS
Discharge Instructions    Discharged to home by car with relative. Discharged via walking, hospital escort: Refused.  Special equipment needed: Not Applicable    Be sure to schedule a follow-up appointment with your primary care doctor or any specialists as instructed.     Discharge Plan:   Influenza Vaccine Indication: Patient Refuses    I understand that a diet low in cholesterol, fat, and sodium is recommended for good health. Unless I have been given specific instructions below for another diet, I accept this instruction as my diet prescription.   Other diet: Regular    Special Instructions: None    · Is patient discharged on Warfarin / Coumadin?   No     Depression / Suicide Risk    As you are discharged from this [facility] facility, it is important to learn how to keep safe from harming yourself.    Recognize the warning signs:  · Abrupt changes in personality, positive or negative- including increase in energy   · Giving away possessions  · Change in eating patterns- significant weight changes-  positive or negative  · Change in sleeping patterns- unable to sleep or sleeping all the time   · Unwillingness or inability to communicate  · Depression  · Unusual sadness, discouragement and loneliness  · Talk of wanting to die  · Neglect of personal appearance   · Rebelliousness- reckless behavior  · Withdrawal from people/activities they love  · Confusion- inability to concentrate     If you or a loved one observes any of these behaviors or has concerns about self-harm, here's what you can do:  · Talk about it- your feelings and reasons for harming yourself  · Remove any means that you might use to hurt yourself (examples: pills, rope, extension cords, firearm)  · Get professional help from the community (Mental Health, Substance Abuse, psychological counseling)  · Do not be alone:Call your Safe Contact- someone whom you trust who will be there for you.  · Call your local CRISIS HOTLINE 356-8540 or  124.479.7812  · Call your local Children's Mobile Crisis Response Team [place] (224) 881-1321 or www.[employer] [facility]dy.wumo  · Call the toll free National Suicide Prevention Hotlines   · National Suicide Prevention Lifeline 390-284-CIMG (1493)  · National Hope Line Network 800-SUICIDE (243-2834)      Discharge Instructions per Trevor Egan M.D.        DIET: diabetic    ACTIVITY: as tolerated, no sexual activity please    DIAGNOSIS: fever    Return to ER if symptoms worsen

## 2020-03-11 NOTE — DISCHARGE PLANNING
Patient is eligible for Medicaid Meds to Beds at discharge if they have coverage with [place] Medicaid, Medicaid FFS, Medicaid HMO ([facility]), or [place]. This service is provided through the [facility] if orders are received prior to 4 p.m. Monday through Friday, excluding holidays. Please call [phone] prior to discharge.

## 2020-03-11 NOTE — DISCHARGE SUMMARY
Discharge Summary    CHIEF COMPLAINT ON ADMISSION  Chief Complaint   Patient presents with   • Flu Like Symptoms     pt complaining of flu like symptoms x's 10 days. Pt was at the [facility] about two weeks ago and stated he sat next to a man who had just gotten off a cruise ship from [place]. Pt reports he has HIV and has been off his meds for a year. Pt reports he had a fever this am of 103 and took aspirin and temperature has resolved        Reason for Admission  Flu like symptoms, Cough, fever     Admission Date  3/9/2020    CODE STATUS  Full Code    HPI & HOSPITAL COURSE  This is a 49 y.o. male here with fever   Mr. Riojas has a history of HIV with recent exposure to a person who traveled to [place] and was on a cruise that began to experience fever and productive cough that has been progressive over the past 2 weeks. He was admitted to the ICU under Coronavirus isolation.     He was empirically started on IV rocephin which was stopped after all cultures negative. COVID-19 was negative.       Interval Problem Update  3/10: patient seen and evaluated in the ICU. Mr. Riojas overall feels very poorly. He has had a mild cough. He has been off his HIV meds for over a year though cannot endorse why. He is interested in getting back on meds and is open to meeting with Dr. Velazquez.   3/11: Mr. Riojas was evaluated and examined on the telemetry floor.  Had a long discussion as condition when he in fact is not been taking any insulin for quite some time.  He is amenable to restarting his HIV meds as well as his insulin which he stopped over a year ago for both.  iscussed a less sophisticated options specifically Lantusinsulin once a day and he can go to the [facility] clinic for his prescriptions.  His CD4/CD8 count was checked and CD4 is 69 and CD8 is 606 with a CD4-CD8 ratio of 0.11.  Dr. Velazquez saw him today and arranged for him to be seen at the [facility] on Friday to restart his antiretrovirals. He is arranging for Bactrim via the  "[facility].    \"Meds to Beds\" has arranged to deliver his insulin and supplies to the hospital today for a one month supply including lancets, test strips, and a glucometer.     Therefore, he is discharged in fair and stable condition to home with close outpatient follow-up.    The patient met 2-midnight criteria for an inpatient stay at the time of discharge.    Discharge Date  3/11    FOLLOW UP ITEMS POST DISCHARGE  Insulin     DISCHARGE DIAGNOSES  Principal Problem (Resolved):    Fever POA: Unknown  Active Problems:    Elevated alkaline phosphatase level POA: Unknown    Pancytopenia (HCC) POA: Yes    DM (diabetes mellitus) (HCC) POA: Yes    HIV (human immunodeficiency virus infection) ([facility]) POA: Yes    Hyponatremia POA: Yes      FOLLOW UP  No future appointments.  [address] [address] [phone]  Call in 1 day      [address] [id number] [phone]  In 3 days        MEDICATIONS ON DISCHARGE     Medication List      START taking these medications      Instructions   Alcohol Swabs   Doctor's comments:  Per formulary preference. ICD-10 code: E11.65 Uncontrolled type 2 Diabetes Mellitus  Wipe site with prep pad prior to injection.     * Blood Glucose Meter Kit   Doctor's comments:  Or per formulary preference. ICD-10 code: E11.65 Uncontrolled type 2 Diabetes Mellitus  Test blood sugar as recommended by provider. True Metrix blood glucose monitoring kit.     * Blood Glucose Test Strips   Doctor's comments:  Or per formulary preference. ICD-10 code: E11.65 Uncontrolled type 2 Diabetes Mellitus  Use one True Metrix strip to test blood sugar twice daily before meals.     insulin glargine 100 UNIT/ML Sopn injection  Generic drug:  insulin glargine   Inject 30 Units as instructed every evening.  Dose:  30 Units     Insulin Pen Needle 32 G x 4 mm   Doctor's comments:  Per patient/formulary preference. ICD-10 code: E11.65 Uncontrolled type 2 " Diabetes Mellitus  Use one pen needle in pen device to inject insulin once daily.     Lancets   Doctor's comments:  Or per formulary preference. ICD-10 code: E11.65 Uncontrolled type 2 Diabetes Mellitus  Use one True Metrix lancet to test blood sugar twice daily before meals.         * This list has 2 medication(s) that are the same as other medications prescribed for you. Read the directions carefully, and ask your doctor or other care provider to review them with you.                Allergies  Allergies   Allergen Reactions   • Acetaminophen Hives   • Hydrocodone-Acetaminophen Vomiting   • Peanut-Derived Anaphylaxis   • Penicillins Hives     Patient tolerated Ancef 08/13/15       DIET  Orders Placed This Encounter   Procedures   • Diet Order Diabetic     Standing Status:   Standing     Number of Occurrences:   1     Order Specific Question:   Diet:     Answer:   Diabetic [3]       ACTIVITY  No sexual contact with others    CONSULTATIONS  Dr. Velazquez, ID    PROCEDURES  none    LABORATORY  Lab Results   Component Value Date    SODIUM 131 (L) 03/11/2020    POTASSIUM 3.9 03/11/2020    CHLORIDE 103 03/11/2020    CO2 25 03/11/2020    GLUCOSE 284 (H) 03/11/2020    BUN 17 03/11/2020    CREATININE 0.63 03/11/2020    CREATININE 0.9 01/22/2008        Lab Results   Component Value Date    WBC 2.7 (L) 03/11/2020    HEMOGLOBIN 12.4 (L) 03/11/2020    HEMATOCRIT 35.1 (L) 03/11/2020    PLATELETCT 141 (L) 03/11/2020    imaging:  US-RUQ   Final Result      No acute sonographic abnormality. No gallstones.      DX-CHEST-PORTABLE (1 VIEW)   Final Result      No acute cardiopulmonary abnormality.            Total time of the discharge process exceeds 32 minutes.

## 2020-03-11 NOTE — CARE PLAN
Problem: Infection  Goal: Will remain free from infection  Intervention: Assess signs and symptoms of infection  Note: Pt. On R/O for COVID 19, in airborne and contact isolation, receiving IV Abx.      Problem: Respiratory:  Goal: Respiratory status will improve  Intervention: Assess and monitor pulmonary status  Note: Pt. Lungs clear/diminished, on room air, receiving IV Abx.

## 2020-03-12 NOTE — PROGRESS NOTES
Discharge instructions reviewed with patient, meds 2 beds delivered, peripheral IV discontinued. All belongings gathered and sent with patient.

## 2020-03-12 NOTE — DISCHARGE PLANNING
Medicaid Meds-to-Beds: Discharge prescription orders listed below delivered to patient's bedside. RN notified. Patient counseled. Reviewed proper insulin injection technique and storage conditions. Patient verbalized understanding.     Vincent Riojas   Home Medication Instructions KRISTIN:[id number]    Printed on:03/11/20 5818   Medication Information                      Alcohol Swabs  Wipe site with prep pad prior to injection.             Blood Glucose Meter Kit  Test blood sugar as recommended by provider. True Metrix blood glucose monitoring kit.             Blood Glucose Test Strips  Use one True Metrix strip to test blood sugar twice daily before meals.             insulin glargine (INSULIN GLARGINE) 100 UNIT/ML Solution Pen-injector injection  Inject 30 Units as instructed every evening.             Insulin Pen Needle 32 G x 4 mm  Use one pen needle in pen device to inject insulin once daily.             Lancets  Use one True Metrix lancet to test blood sugar twice daily before meals.               Serene Kumar, PharmD

## 2020-03-13 LAB
ALP BONE SERPL-CCNC: 33 U/L (ref 0–55)
ALP ISOS SERPL HS-CCNC: 0 U/L
ALP LIVER SERPL-CCNC: 632 U/L (ref 0–94)
ALP SERPL-CCNC: 665 U/L (ref 40–120)

## 2020-03-14 LAB
BACTERIA BLD CULT: NORMAL
SIGNIFICANT IND 70042: NORMAL
SITE SITE: NORMAL
SOURCE SOURCE: NORMAL

## 2020-11-26 PROBLEM — K85.90 PANCREATITIS: Status: ACTIVE | Noted: 2020-01-01

## 2020-11-26 NOTE — ASSESSMENT & PLAN NOTE
Patient states that he has been on HIV medication for a little bit longer than a year, no previous episodes of abdominal pain. Unlikely cause at this time.  Patient is amphetamine positive. This can cause pancreatitis  Lipase only mildly elevated  No cholelithiasis was seen on CT at the outside facility or RUQ during this admission  Negative lipid panel  Patient does not drink alcohol  Clear liquid diet  IV fluid hydration with LR  Pain control with oxycodone and IV morphine, monitor respiratory status closely

## 2020-11-26 NOTE — ED NOTES
Pt resting in bed, VSS on RA, states return of abd pain worsening, MD notified, will continue to monitor, updated POC to wait for inpatient bed

## 2020-11-26 NOTE — H&P
Hospital Medicine History & Physical Note    Date of Service  11/26/2020    Primary Care Physician  Miladys Sanchez P.A.-C.    Consultants  None    Code Status  Full Code    Chief Complaint  Chief Complaint   Patient presents with   • Abdominal Pain     since yesterday, was seen at [facility] yesterday for same        History of Presenting Illness  50 y.o. male with past medical history of HIV, diabetes mellitus who presented 11/26/2020 with abdominal pain started yesterday.  The patient reports central abdominal pain without radiation associated with nausea and vomiting.  He denies any fevers, chills, chest pain, shortness of breath or diarrhea.  Denies any blood in his vomit or stools.  Patient reports a history of pancreatitis.  He is currently taking Triumeq which can cause pancreatitis.  He denies drinking alcohol in the past 11 years.  He presented to [facility] yesterday and underwent a CT abdomen pelvis with IV contrast that revealed prominent stool in the colon, appendix is normal.  The urinary bladder is somewhat lobular in contour.  It is unchanged in appearance when compared to 6/30/2018.  He was recommended outpatient follow-up with urology for his bladder CT findings.  His lipase was 154.  Today and [facility] his lipase returned elevated at 171.    Review of Systems  Review of Systems   Constitutional: Negative for chills, diaphoresis and fever.   HENT: Negative for hearing loss and sore throat.    Eyes: Negative for blurred vision.   Respiratory: Negative for cough, sputum production, shortness of breath and wheezing.    Cardiovascular: Negative for chest pain, palpitations and leg swelling.   Gastrointestinal: Positive for abdominal pain, nausea and vomiting. Negative for blood in stool and diarrhea.   Genitourinary: Negative for dysuria, flank pain and urgency.   Musculoskeletal: Negative for back pain, joint pain, myalgias and neck pain.   Skin: Negative for rash.   Neurological: Negative for  dizziness, focal weakness, seizures and headaches.   Endo/Heme/Allergies: Does not bruise/bleed easily.   Psychiatric/Behavioral: Negative for suicidal ideas.   All other systems reviewed and are negative.      Past Medical History   has a past medical history of AIDS (acquired immune deficiency syndrome) (HCC), Brain tumor (HCC), CAD (coronary artery disease), Cancer (HCC), Depression, Diabetes mellitus, HIV (human immunodeficiency virus infection) (HCC), HIV (human immunodeficiency virus infection) (HCC), Lymphoma (HCC), and Myocardial infarct (HCC).    Surgical History  No pertinent surgical history    Family History  No pertinent family history    Social History   reports that he has been smoking cigarettes. He has been smoking about 0.50 packs per day. He has never used smokeless tobacco. He reports that he does not drink alcohol or use drugs.    Allergies  Allergies   Allergen Reactions   • Peanut-Derived Anaphylaxis   • Acetaminophen Hives   • Hydrocodone-Acetaminophen Vomiting   • Penicillins Hives     Patient tolerated Ancef 08/13/15       Medications  Prior to Admission Medications   Prescriptions Last Dose Informant Patient Reported? Taking?   Abacavir-Dolutegravir-Lamivud (TRIUMEQ) 600- MG Tab 11/24/2020 at am  Yes Yes   Sig: Take 1 Tab by mouth every day.   Alcohol Swabs   No No   Sig: Wipe site with prep pad prior to injection.   Blood Glucose Meter Kit   No No   Sig: Test blood sugar as recommended by provider. True Metrix blood glucose monitoring kit.   Blood Glucose Test Strips   No No   Sig: Use one True Metrix strip to test blood sugar twice daily before meals.   Insulin Pen Needle 32 G x 4 mm   No No   Sig: Use one pen needle in pen device to inject insulin once daily.   Lancets   No No   Sig: Use one True Metrix lancet to test blood sugar twice daily before meals.   insulin glargine (INSULIN GLARGINE) 100 UNIT/ML Solution Pen-injector injection Not Taking at Unknown time  No No   Sig:  Inject 30 Units as instructed every evening.   Patient not taking: Reported on 11/26/2020      Facility-Administered Medications: None       Physical Exam  Temp:  [36.3 °C (97.4 °F)] 36.3 °C (97.4 °F)  Pulse:  [88-92] 90  Resp:  [18-19] 18  BP: (108-146)/(62-84) 108/62  SpO2:  [97 %-100 %] 97 %    Physical Exam  Vitals signs and nursing note reviewed.   Constitutional:       General: He is not in acute distress.     Appearance: Normal appearance.   HENT:      Head: Normocephalic and atraumatic.      Nose: Nose normal.      Mouth/Throat:      Mouth: Mucous membranes are moist.   Eyes:      Extraocular Movements: Extraocular movements intact.      Conjunctiva/sclera: Conjunctivae normal.      Pupils: Pupils are equal, round, and reactive to light.   Neck:      Musculoskeletal: Normal range of motion and neck supple.   Cardiovascular:      Rate and Rhythm: Normal rate and regular rhythm.      Pulses: Normal pulses.      Heart sounds: Normal heart sounds.   Pulmonary:      Effort: Pulmonary effort is normal. No respiratory distress.      Breath sounds: Normal breath sounds. No wheezing, rhonchi or rales.   Abdominal:      General: Bowel sounds are normal. There is no distension.      Palpations: Abdomen is soft.      Tenderness: There is abdominal tenderness.   Musculoskeletal: Normal range of motion.         General: No swelling or tenderness.   Lymphadenopathy:      Cervical: No cervical adenopathy.   Skin:     General: Skin is warm.      Coloration: Skin is not jaundiced.      Findings: No rash.   Neurological:      General: No focal deficit present.      Mental Status: He is alert and oriented to person, place, and time.      Cranial Nerves: No cranial nerve deficit.      Motor: No weakness.   Psychiatric:         Mood and Affect: Mood normal.         Behavior: Behavior normal.         Laboratory:  Recent Labs     11/26/20  0905   WBC 4.2*   RBC 5.58   HEMOGLOBIN 16.3   HEMATOCRIT 46.0   MCV 82.4   MCH 29.2   MCHC  35.4*   RDW 38.5   PLATELETCT 142*   MPV 10.1     Recent Labs     11/26/20  0905   SODIUM 129*   POTASSIUM 4.4   CHLORIDE 95*   CO2 28   GLUCOSE 334*   BUN 16   CREATININE 0.81   CALCIUM 9.3     Recent Labs     11/26/20  0905   ALTSGPT 43   ASTSGOT 20   ALKPHOSPHAT 176*   TBILIRUBIN 0.5   AMYLASE 52   LIPASE 171*   GLUCOSE 334*         No results for input(s): NTPROBNP in the last 72 hours.      No results for input(s): TROPONINT in the last 72 hours.    Imaging:  No orders to display         Assessment/Plan:  I anticipate this patient will require at least two midnights for appropriate medical management, necessitating inpatient admission.    * Pancreatitis- (present on admission)  Assessment & Plan  Likely secondary to HIV medication, which has been held for now  No cholelithiasis was seen on CT at the outside facility.  Patient does not drink alcohol  Clear liquid diet  IV fluid hydration with LR  Pain control with oxycodone and IV morphine, monitor respiratory status closely    Hyponatremia- (present on admission)  Assessment & Plan  Hypovolemic hyponatremia  IV fluid hydration   Monitor BMP and assess response      HIV (human immunodeficiency virus infection) (HCC)- (present on admission)  Assessment & Plan  Hold Biktarv due to adverse effect of pancreatitis  Follow up with ID as outpatient    DM (diabetes mellitus) (HCC)- (present on admission)  Assessment & Plan  Uncontrolled with hyperglycemia  Start on insulin sliding scale with serial Accu-Checks  Hypoglycemic protocol in place

## 2020-11-26 NOTE — ED TRIAGE NOTES
BIB [facility] to rm 11  Chief Complaint   Patient presents with   • Abdominal Pain     since yesterday, was seen at [facility] yesterday for same      Pt said he had blood work and a CT, but they didn't find anything. Said he was given toradol which helped, but is having more pain today. Denies any n/v/d.  Chart up for ERP.

## 2020-11-26 NOTE — ED PROVIDER NOTES
ED Provider Note    Scribed for Faustino Pike M.D. by Marie Canela. 11/26/2020  8:44 AM    Primary care provider: Miladys Sanchez P.A.-C.  Means of arrival: Ambulance  History obtained from: Patient  History limited by: None    CHIEF COMPLAINT  Chief Complaint   Patient presents with   • Abdominal Pain     since yesterday, was seen at [facility] yesterday for same      HPI  Vincent Riojas is a 50 y.o. male who presents to the Emergency Department brought in by ambulance for waxing and waning mid upper abdominal pain since yesterday. He was seen at [facility] yesterday for this same complaint and had CT and labs done with no abnormal findings. He was told there was something wrong with his bladder and to follow-up with urology. He was not placed on an acid blocker. No modifying factors noted and his shooting pain to his back yesterday ha since resolved. He has experienced a similar pain like this in the past when he had pancreatitis. Per patient, he takes Motrin a few times a month. Otherwise, he denies frequently taking NSAIDS. Denies fever, dysuria, diarrhea, chest pain, urinary urgency or frequency, constipation, or known coronavirus exposure. Patient has a drinking history but has been sober for 11 years. Denies any abdominal surgeries in the past and denies a past medical history of ulcers, gastritis, or gallstones. He has never had an endoscopy. However, he does reports a past medical history of diabetes and HIV.  He has never had AIDS.  He is intermittently compliant with medication.  History of prior single episode of pancreatitis of unclear etiology approximately 5 years ago.  He does not know his CD4 count or viral load.    REVIEW OF SYSTEMS  Pertinent positives include: abdominal pain.  Pertinent negatives include: fever, dysuria, diarrhea, urinary urgency or frequency, or constipation.  10+ systems reviewed and negative.      PAST MEDICAL HISTORY  Past Medical History:   Diagnosis Date   • AIDS (acquired  immune deficiency syndrome) (HCC)    • Brain tumor (HCC)    • CAD (coronary artery disease)    • Cancer (HCC)    • Depression    • Diabetes mellitus    • HIV (human immunodeficiency virus infection) (HCC)    • HIV (human immunodeficiency virus infection) (HCC)    • Lymphoma (HCC)    • Myocardial infarct (HCC)          SOCIAL HISTORY  Social History     Tobacco Use   • Smoking status: Current Every Day Smoker     Packs/day: 0.50     Types: Cigarettes     Last attempt to quit: 2015     Years since quittin.2   • Smokeless tobacco: Never Used   Substance Use Topics   • Alcohol use: No     Comment: socially   • Drug use: No     Types: Intravenous     Comment: hx of. currently clean     Social History     Substance and Sexual Activity   Drug Use No   • Types: Intravenous    Comment: hx of. currently clean       SURGICAL HISTORY  Denies abdominal surgeries.     CURRENT MEDICATIONS    Current Facility-Administered Medications:   •  ondansetron (ZOFRAN) syringe/vial injection 4 mg, 4 mg, Intravenous, Q HOUR PRN, Faustino Pike M.D., 4 mg at 20 0906  •  NS infusion 1,000 mL, 1,000 mL, Intravenous, Once, Faustino Pike M.D.    Current Outpatient Medications:   •  Abacavir-Dolutegravir-Lamivud (TRIUMEQ) 600- MG Tab, Take 1 Tab by mouth every day., Disp: , Rfl:   •  Blood Glucose Meter Kit, Test blood sugar as recommended by provider. True Metrix blood glucose monitoring kit., Disp: 1 Kit, Rfl: 0  •  Blood Glucose Test Strips, Use one True Metrix strip to test blood sugar twice daily before meals., Disp: 100 Strip, Rfl: 0  •  Lancets, Use one True Metrix lancet to test blood sugar twice daily before meals., Disp: 100 Each, Rfl: 0  •  Alcohol Swabs, Wipe site with prep pad prior to injection., Disp: 100 Each, Rfl: 0  •  Insulin Pen Needle 32 G x 4 mm, Use one pen needle in pen device to inject insulin once daily., Disp: 100 Each, Rfl: 0  •  insulin glargine (INSULIN GLARGINE) 100 UNIT/ML Solution  "Pen-injector injection, Inject 30 Units as instructed every evening. (Patient not taking: Reported on 11/26/2020), Disp: 5 PEN, Rfl: 6      ALLERGIES  Allergies   Allergen Reactions   • Acetaminophen Hives   • Hydrocodone-Acetaminophen Vomiting   • Peanut-Derived Anaphylaxis   • Penicillins Hives     Patient tolerated Ancef 08/13/15       PHYSICAL EXAM  VITAL SIGNS: /84   Pulse 92   Temp 36.3 °C (97.4 °F) (Temporal)   Resp 19   Ht 1.854 m (6' 1\")   Wt 77.1 kg (170 lb)   SpO2 100%   BMI 22.43 kg/m²   Reviewed and elevated blood pressure, afebrile  Constitutional: Well developed, Well nourished  HENT: Normocephalic, atraumatic, bilateral external ears normal, wearing a mask.   Eyes: PERRLA, conjunctiva pink, no scleral icterus.   Cardiovascular: Regular rate and rhythm. No murmurs, rubs or gallops.  No dependent edema or calf tenderness  Respiratory: Lungs clear to auscultation bilaterally. No wheezes, rales, or rhonchi.  Abdominal:  Abdomen soft, mild to moderate upper epigastric tenderness,  non distended. No rebound, or guarding.    Skin: No erythema, no rash.   Genitourinary: No costovertebral angle tenderness.   Musculoskeletal: no deformities.   Neurologic: Alert & oriented x 3, cranial nerves 2-12 intact by passive exam.  No focal deficit noted.  Psychiatric: Affect normal, Judgment normal, Mood normal.       DIFFERENTIAL DIAGNOSIS:  Gastritis, peptic ulcer disease, pancreatitis, medication induced pancreatitis, doubt AIDS defining illness.    LABORATORY:  Results for orders placed or performed during the hospital encounter of 11/26/20   CBC WITH DIFFERENTIAL   Result Value Ref Range    WBC 4.2 (L) 4.8 - 10.8 K/uL    RBC 5.58 4.70 - 6.10 M/uL    Hemoglobin 16.3 14.0 - 18.0 g/dL    Hematocrit 46.0 42.0 - 52.0 %    MCV 82.4 81.4 - 97.8 fL    MCH 29.2 27.0 - 33.0 pg    MCHC 35.4 (H) 33.7 - 35.3 g/dL    RDW 38.5 35.9 - 50.0 fL    Platelet Count 142 (L) 164 - 446 K/uL    MPV 10.1 9.0 - 12.9 fL    " Neutrophils-Polys 42.40 (L) 44.00 - 72.00 %    Lymphocytes 42.30 (H) 22.00 - 41.00 %    Monocytes 12.30 0.00 - 13.40 %    Eosinophils 2.60 0.00 - 6.90 %    Basophils 0.20 0.00 - 1.80 %    Immature Granulocytes 0.20 0.00 - 0.90 %    Nucleated RBC 0.00 /100 WBC    Neutrophils (Absolute) 1.79 (L) 1.82 - 7.42 K/uL    Lymphs (Absolute) 1.79 1.00 - 4.80 K/uL    Monos (Absolute) 0.52 0.00 - 0.85 K/uL    Eos (Absolute) 0.11 0.00 - 0.51 K/uL    Baso (Absolute) 0.01 0.00 - 0.12 K/uL    Immature Granulocytes (abs) 0.01 0.00 - 0.11 K/uL    NRBC (Absolute) 0.00 K/uL   Comp Metabolic Panel   Result Value Ref Range    Sodium 129 (L) 135 - 145 mmol/L    Potassium 4.4 3.6 - 5.5 mmol/L    Chloride 95 (L) 96 - 112 mmol/L    Co2 28 20 - 33 mmol/L    Anion Gap 6.0 (L) 7.0 - 16.0    Glucose 334 (H) 65 - 99 mg/dL    Bun 16 8 - 22 mg/dL    Creatinine 0.81 0.50 - 1.40 mg/dL    Calcium 9.3 8.5 - 10.5 mg/dL    AST(SGOT) 20 12 - 45 U/L    ALT(SGPT) 43 2 - 50 U/L    Alkaline Phosphatase 176 (H) 30 - 99 U/L    Total Bilirubin 0.5 0.1 - 1.5 mg/dL    Albumin 3.7 3.2 - 4.9 g/dL    Total Protein 6.7 6.0 - 8.2 g/dL    Globulin 3.0 1.9 - 3.5 g/dL    A-G Ratio 1.2 g/dL   LIPASE   Result Value Ref Range    Lipase 171 (H) 11 - 82 U/L   AMYLASE   Result Value Ref Range    Amylase 52 20 - 103 U/L     Lab results reviewed by me.     INTERVENTIONS: Indication IV fluids n.p.o. given pancreatitis  Medications   ondansetron (ZOFRAN) syringe/vial injection 4 mg (4 mg Intravenous Given 11/26/20 0906)   morphine (pf) 4 mg/mL injection 4 mg (4 mg Intravenous Given 11/26/20 0906)     Response: Improved hydration.    ED COURSE:  Nursing notes, VS, PMSFHx reviewed in chart.     8:44 AM - Patient seen and examined at bedside. Patient will be treated with morphine injection 4 mg and Zofran 4 mg for his symptoms. Ordered estimated GFR, CBC with differential, CMP, lipase, urinalysis, and amylase to evaluate.     10:25 AM - Recheck. Patient's pain is now 3/10. Still  waiting to get records from [facility].     10:48 AM - Received records that were incomplete.     11:07 AM - Spoke with pharmacy.     11:37 AM - Consulted with Dr. Snyder (Hospitalist) who agrees to evaluate the patient for hospitalization.    MEDICAL DECISION MAKING:  This patient presents with persistent abdominal pain and likely has antiretroviral (lamivudine)  induced pancreatitis.  He is not drinking alcohol.  He does not meet the strict criteria for acute pancreatitis but given his prior episode he may not be able to elevate his lipase so much.  Cholecystitis is unlikely.  CT imaging obtained from [facility] and the patient only had a thickened bladder wall for which he will follow-up with urology.  Is no evidence of other acute abdominal process.  Given the patient's history of lymphoma and intermittent compliance with antiretrovirals he may actually have met the criteria for AIDS at some point.    PLAN:  N.p.o., IV fluids, hold lamivudine, infectious disease consult    CONDITION: Guarded .     FINAL IMPRESSION  1. Drug-induced acute pancreatitis, unspecified complication status    2. Asymptomatic HIV infection (HCC)          Marie ARTHUR (Arturo), am scribing for, and in the presence of, Faustino Pike M.D..    Electronically signed by: Marie Canela (Arturo), 11/26/2020    Faustino ARTHUR M.D. personally performed the services described in this documentation, as scribed by Marie Canela in my presence, and it is both accurate and complete. C    The note accurately reflects work and decisions made by me.  Faustino Pike M.D.  11/26/2020  12:12 PM

## 2020-11-26 NOTE — ED NOTES
Pt sleeping in bed, VSS on RA, no distress, states relief s/p morphine admin, updated POC to admit, will continue to monitor.

## 2020-11-26 NOTE — ED NOTES
Report called to Cris YATES, transport request placed, pt sleeping in bed, VSS on RA, no distress

## 2020-11-26 NOTE — ASSESSMENT & PLAN NOTE
Uncontrolled with hyperglycemia  Start on insulin sliding scale with serial Accu-Checks  Hypoglycemic protocol in place

## 2020-11-27 NOTE — PROGRESS NOTES
Pt arrived to [facility] via jack with ED RN. Telephone report received from Vikram YATES. Pt oriented to room, unit, and plan of care. All questions answered at this time. Call light within reach, fall precautions in place, will continue to monitor.

## 2020-11-27 NOTE — PROGRESS NOTES
"Report received. Assessment complete. Pt A&O x4    Vital Signs: /61   Pulse 68   Temp 36.4 °C (97.5 °F) (Temporal)   Resp 17   Ht 1.854 m (6' 1\")   Wt 77.1 kg (170 lb)   SpO2 98%   BMI 22.43 kg/m²     Pain: Patient has 0/10 pain. Pain managed with prescribed medications as needed.    Neuro: +numbness/tingling baseline hands and feet    Cardiac: Denies chest pain. Regular heart sounds    Vascular pulses +2 BUE, BLE. No edema noted    Respiratory: Patient denies SOB. On RA. Sating 90%<.     GI: Patient passing flatus, last BM 11/27. Tolerating clear liquid diet. Denies N/V.    : Patient voiding adequately.    MSK: Patient ambulates with no assist. Bed alarm NA. Patient calling appropriately    Skin: in tact    Discharge Barriers/Plan: Plan for US this morning    Fall precautions in place. Treaded socks on. Bed locked in lowest position. Call light and personal belongings at bedside within reach. Reinforced use of call light to patient. Hourly rounding in place.  All needs met at this time and POC discussed.     "

## 2020-11-27 NOTE — PROGRESS NOTES
Hospital Medicine Daily Progress Note    Date of Service  11/27/2020    Chief Complaint  50 y.o. male admitted 11/26/2020 with abdominal pain    Hospital Course    50 y.o. male with past medical history of HIV, diabetes mellitus who presented 11/26/2020 with abdominal pain started yesterday.  The patient reports central abdominal pain without radiation associated with nausea and vomiting.  He denies any fevers, chills, chest pain, shortness of breath or diarrhea.  Denies any blood in his vomit or stools.  Patient reports a history of pancreatitis.  He is currently taking Triumeq which can cause pancreatitis.  He denies drinking alcohol in the past 11 years.  He presented to [facility] yesterday and underwent a CT abdomen pelvis with IV contrast that revealed prominent stool in the colon, appendix is normal.  The urinary bladder is somewhat lobular in contour.  It is unchanged in appearance when compared to 6/30/2018.  He was recommended outpatient follow-up with urology for his bladder CT findings.  His lipase was 154.  Today and [facility] his lipase returned elevated at 171.    11/27/2020:  Patient's current pancreatitis likely secondary to amphetamine use. Patient has been on HIV medication for over a year, no previous abdominal pain. Less likely to be the cause. Negative alcohol and lipid panel. Continue IV volume replacement and pain management.    Interval Problem Update    Interval improvement in abdominal pain  No chest pain, palpitations, SOB  Tolerating oral intake, advance   No overnight events reported    Consultants/Specialty  none    Code Status  Full Code    Disposition  Continue current management likely AM discharge    Review of Systems  Review of Systems   Constitutional: Negative for chills, fever and malaise/fatigue.   HENT: Negative for congestion, hearing loss, sore throat and tinnitus.    Eyes: Negative for blurred vision, double vision, photophobia and discharge.   Respiratory:  Negative for cough, hemoptysis, sputum production, shortness of breath and wheezing.    Cardiovascular: Negative for chest pain, palpitations, orthopnea, claudication and leg swelling.   Gastrointestinal: Positive for abdominal pain. Negative for blood in stool, diarrhea, heartburn, melena, nausea and vomiting.   Genitourinary: Negative for dysuria, flank pain, hematuria and urgency.   Musculoskeletal: Negative for back pain, joint pain and myalgias.   Skin: Negative for itching and rash.   Neurological: Negative for dizziness, sensory change, speech change, weakness and headaches.   Endo/Heme/Allergies: Does not bruise/bleed easily.   Psychiatric/Behavioral: Negative for depression and suicidal ideas.        Physical Exam  Temp:  [36.1 °C (97 °F)-36.6 °C (97.9 °F)] 36.4 °C (97.5 °F)  Pulse:  [68-86] 68  Resp:  [11-17] 17  BP: ()/(59-77) 100/61  SpO2:  [94 %-100 %] 98 %    Physical Exam  Vitals signs reviewed.   Constitutional:       Appearance: Normal appearance. He is obese.   HENT:      Head: Normocephalic and atraumatic.      Nose: No congestion or rhinorrhea.      Mouth/Throat:      Mouth: Mucous membranes are moist.      Pharynx: Oropharynx is clear.   Eyes:      General: No scleral icterus.     Extraocular Movements: Extraocular movements intact.      Conjunctiva/sclera: Conjunctivae normal.      Pupils: Pupils are equal, round, and reactive to light.   Neck:      Musculoskeletal: Neck supple. No neck rigidity or muscular tenderness.   Cardiovascular:      Rate and Rhythm: Normal rate and regular rhythm.      Heart sounds: No murmur. No friction rub. No gallop.    Pulmonary:      Effort: Pulmonary effort is normal. No respiratory distress.      Breath sounds: Normal breath sounds. No stridor. No wheezing, rhonchi or rales.   Abdominal:      General: Abdomen is flat. Bowel sounds are normal. There is no distension.      Palpations: Abdomen is soft. There is no mass.      Tenderness: There is no abdominal  tenderness. There is no guarding or rebound.   Musculoskeletal:         General: No swelling, tenderness or deformity.   Lymphadenopathy:      Cervical: No cervical adenopathy.   Skin:     General: Skin is warm and dry.      Findings: No erythema or rash.   Neurological:      General: No focal deficit present.      Mental Status: He is alert and oriented to person, place, and time. Mental status is at baseline.      Cranial Nerves: No cranial nerve deficit.      Sensory: No sensory deficit.      Motor: No weakness.   Psychiatric:         Mood and Affect: Mood normal.         Behavior: Behavior normal.         Thought Content: Thought content normal.         Fluids    Intake/Output Summary (Last 24 hours) at 11/27/2020 1319  Last data filed at 11/27/2020 1040  Gross per 24 hour   Intake 1340 ml   Output 100 ml   Net 1240 ml       Laboratory  Recent Labs     11/26/20  0905 11/27/20  0039   WBC 4.2* 3.5*   RBC 5.58 4.80   HEMOGLOBIN 16.3 14.1   HEMATOCRIT 46.0 40.6*   MCV 82.4 84.6   MCH 29.2 29.4   MCHC 35.4* 34.7   RDW 38.5 40.5   PLATELETCT 142* 112*   MPV 10.1 9.8     Recent Labs     11/26/20  0905 11/27/20  0039   SODIUM 129* 129*   POTASSIUM 4.4 4.0   CHLORIDE 95* 100   CO2 28 25   GLUCOSE 334* 168*   BUN 16 13   CREATININE 0.81 0.61   CALCIUM 9.3 8.3*             Recent Labs     11/27/20  0932   TRIGLYCERIDE 99   HDL 26*   *       Imaging  US-RUQ   Final Result      1.  The liver is diffusely echogenic, most compatible with fatty infiltration, however other hepatocellular disease processes are in the differential diagnosis.   2.  There is mild gallbladder wall thickening. This can be associated with hepatocellular disease such as hepatitis or hypoproteinemia.           Assessment/Plan  * Pancreatitis- (present on admission)  Assessment & Plan  Patient states that he has been on HIV medication for a little bit longer than a year, no previous episodes of abdominal pain. Unlikely cause at this time.  Patient  is amphetamine positive. This can cause pancreatitis  Lipase only mildly elevated  No cholelithiasis was seen on CT at the outside facility or RUQ during this admission  Negative lipid panel  Patient does not drink alcohol  Clear liquid diet  IV fluid hydration with LR  Pain control with oxycodone and IV morphine, monitor respiratory status closely    Hyponatremia- (present on admission)  Assessment & Plan  Hypovolemic hyponatremia  IV fluid hydration   Monitor BMP and assess response      HIV (human immunodeficiency virus infection) (HCC)- (present on admission)  Assessment & Plan  Hold Biktarv due to adverse effect of pancreatitis  Follow up with ID as outpatient    DM (diabetes mellitus) (HCC)- (present on admission)  Assessment & Plan  Uncontrolled with hyperglycemia  Start on insulin sliding scale with serial Accu-Checks  Hypoglycemic protocol in place             VTE prophylaxis: lovenox

## 2020-11-28 PROBLEM — B19.10 HBV (HEPATITIS B VIRUS) INFECTION: Status: ACTIVE | Noted: 2020-01-01

## 2020-11-28 NOTE — PROGRESS NOTES
Assumed patient care. Bedside report received. Patient's plan of care reviewed. Patient found lying supine in bed, A&Ox 4. On RA. VSS. Educated patient on using call light to call for needs. No signs of distress, mild nausea/cramping abdominal pain at this time. All needs met. Safety precautions in place. Pt is medical. Bed in lowest/locked position. Bed alarm off, pt up self. Calls appriopiately. Call light and personal belongings within reach. Will continue to monitor.

## 2020-11-28 NOTE — PROGRESS NOTES
On/off c/o abd pain. Medicated per mar w/ relief. Denies nausea. VSS, afebrile. Voiding w/out problems. Ambulating ad nadine to bathroom w/ steady gait.

## 2020-11-28 NOTE — ASSESSMENT & PLAN NOTE
Patient with positive HBV Surface Antigen and HBV IgM antibodies indicating acute infection  No signs of hepatic failure  May partially contribute to patient's initial abdominal pain  Supportive management  Counseled extensively on the need for outpatient follow up.

## 2020-11-28 NOTE — PROGRESS NOTES
Hospital Medicine Daily Progress Note    Date of Service  11/28/2020    Chief Complaint  50 y.o. male admitted 11/26/2020 with abdominal pain    Hospital Course    50 y.o. male with past medical history of HIV, diabetes mellitus who presented 11/26/2020 with abdominal pain started yesterday.  The patient reports central abdominal pain without radiation associated with nausea and vomiting.  He denies any fevers, chills, chest pain, shortness of breath or diarrhea.  Denies any blood in his vomit or stools.  Patient reports a history of pancreatitis.  He is currently taking Triumeq which can cause pancreatitis.  He denies drinking alcohol in the past 11 years.  He presented to [facility] yesterday and underwent a CT abdomen pelvis with IV contrast that revealed prominent stool in the colon, appendix is normal.  The urinary bladder is somewhat lobular in contour.  It is unchanged in appearance when compared to 6/30/2018.  He was recommended outpatient follow-up with urology for his bladder CT findings.  His lipase was 154.  Today and [facility] his lipase returned elevated at 171.    11/27/2020:  Patient's current pancreatitis likely secondary to amphetamine use. Patient has been on HIV medication for over a year, no previous abdominal pain. Less likely to be the cause. Negative alcohol and lipid panel. Continue IV volume replacement and pain management.    11/28/2020:  Patient with positive HBV screen suggestive of acute infection. Supportive management. Advised close outpatient follow up. Patient with persistent hyponatremia likely secondary to IVF which have since been discontinued. AM discharge if hyponatremia and pain improved.    Interval Problem Update    Patient with improving abdominal pain. He is tolerating oral intake with advanced diet.\   Ambulating independently  No overnight events reported    Consultants/Specialty  none    Code Status  Full Code    Disposition  Continue current management  likely AM discharge    Review of Systems  Review of Systems   Constitutional: Negative for chills, fever and malaise/fatigue.   HENT: Negative for hearing loss and tinnitus.    Eyes: Negative for blurred vision, double vision, photophobia and discharge.   Respiratory: Negative for cough and hemoptysis.    Cardiovascular: Negative for chest pain and palpitations.   Gastrointestinal: Positive for abdominal pain. Negative for nausea and vomiting.   Genitourinary: Negative for dysuria, flank pain, hematuria and urgency.   Musculoskeletal: Negative for back pain and myalgias.   Skin: Negative for itching and rash.   Neurological: Negative for dizziness and speech change.   Endo/Heme/Allergies: Does not bruise/bleed easily.   Psychiatric/Behavioral: Negative for depression.        Physical Exam  Temp:  [36.8 °C (98.2 °F)-37 °C (98.6 °F)] 36.8 °C (98.2 °F)  Pulse:  [78-94] 78  Resp:  [16-20] 16  BP: (120-147)/(77-85) 120/77  SpO2:  [94 %-96 %] 95 %    Physical Exam  Vitals signs reviewed.   Constitutional:       Appearance: Normal appearance. He is obese.   HENT:      Head: Normocephalic and atraumatic.      Nose: No congestion or rhinorrhea.      Mouth/Throat:      Mouth: Mucous membranes are moist.      Pharynx: Oropharynx is clear.   Eyes:      General: No scleral icterus.     Extraocular Movements: Extraocular movements intact.      Conjunctiva/sclera: Conjunctivae normal.      Pupils: Pupils are equal, round, and reactive to light.   Neck:      Musculoskeletal: Neck supple. No neck rigidity or muscular tenderness.   Cardiovascular:      Rate and Rhythm: Normal rate and regular rhythm.      Heart sounds: No murmur. No friction rub. No gallop.    Pulmonary:      Effort: Pulmonary effort is normal. No respiratory distress.      Breath sounds: Normal breath sounds. No stridor. No wheezing, rhonchi or rales.   Abdominal:      General: Abdomen is flat. Bowel sounds are normal. There is no distension.      Palpations:  Abdomen is soft. There is no mass.      Tenderness: There is no abdominal tenderness. There is no guarding or rebound.   Musculoskeletal:         General: No swelling, tenderness or deformity.   Lymphadenopathy:      Cervical: No cervical adenopathy.   Skin:     General: Skin is warm and dry.      Findings: No erythema or rash.   Neurological:      General: No focal deficit present.      Mental Status: He is alert and oriented to person, place, and time. Mental status is at baseline.      Cranial Nerves: No cranial nerve deficit.      Sensory: No sensory deficit.      Motor: No weakness.   Psychiatric:         Mood and Affect: Mood normal.         Behavior: Behavior normal.         Thought Content: Thought content normal.         Fluids    Intake/Output Summary (Last 24 hours) at 11/28/2020 1153  Last data filed at 11/28/2020 0841  Gross per 24 hour   Intake 870 ml   Output 1175 ml   Net -305 ml       Laboratory  Recent Labs     11/26/20  0905 11/27/20  0039 11/28/20  0047   WBC 4.2* 3.5* 3.5*   RBC 5.58 4.80 4.85   HEMOGLOBIN 16.3 14.1 14.1   HEMATOCRIT 46.0 40.6* 39.9*   MCV 82.4 84.6 82.3   MCH 29.2 29.4 29.1   MCHC 35.4* 34.7 35.3   RDW 38.5 40.5 37.2   PLATELETCT 142* 112* 111*   MPV 10.1 9.8 9.8     Recent Labs     11/26/20  0905 11/27/20  0039 11/28/20  0047   SODIUM 129* 129* 127*   POTASSIUM 4.4 4.0 4.0   CHLORIDE 95* 100 95*   CO2 28 25 26   GLUCOSE 334* 168* 136*   BUN 16 13 10   CREATININE 0.81 0.61 0.64   CALCIUM 9.3 8.3* 8.5             Recent Labs     11/27/20  0932   TRIGLYCERIDE 99   HDL 26*   *       Imaging  US-RUQ   Final Result      1.  The liver is diffusely echogenic, most compatible with fatty infiltration, however other hepatocellular disease processes are in the differential diagnosis.   2.  There is mild gallbladder wall thickening. This can be associated with hepatocellular disease such as hepatitis or hypoproteinemia.           Assessment/Plan  * Pancreatitis- (present on  admission)  Assessment & Plan  Patient states that he has been on HIV medication for a little bit longer than a year, no previous episodes of abdominal pain. Unlikely cause at this time.  Patient is amphetamine positive. This can cause pancreatitis  Lipase only mildly elevated  No cholelithiasis was seen on CT at the outside facility or RUQ during this admission  Negative lipid panel  Patient does not drink alcohol  Clear liquid diet  IV fluid hydration with LR  Pain control with oxycodone and IV morphine, monitor respiratory status closely    HBV (hepatitis B virus) infection  Assessment & Plan  Patient with positive HBV Surface Antigen and HBV IgM antibodies indicating acute infection  No signs of hepatic failure  May partially contribute to patient's initial abdominal pain  Supportive management  Counseled extensively on the need for outpatient follow up.     Hyponatremia- (present on admission)  Assessment & Plan  Likely with dilutional aspect.  Will D/C IVF as patient is tolerating oral intake.      GERD (gastroesophageal reflux disease)- (present on admission)  Assessment & Plan  Continue famotidine  May be cause of patient's abdominal pain    HIV (human immunodeficiency virus infection) (HCC)- (present on admission)  Assessment & Plan  Hold Biktarv due to adverse effect of pancreatitis  Follow up with ID as outpatient    DM (diabetes mellitus) (HCC)- (present on admission)  Assessment & Plan  Uncontrolled with hyperglycemia  Start on insulin sliding scale with serial Accu-Checks  Hypoglycemic protocol in place           VTE prophylaxis: lovenox

## 2020-11-29 NOTE — PROGRESS NOTES
Pt wanting to go AMA, states he feels better and feels no reason to stay. He states he will follow up with his PMD Dr Sanchez on Monday. Explained to pt risks fo actions and states he's aware of his actions and will be held liable as a result of. AMA form signed. Dr Jo aware.

## 2020-11-29 NOTE — PROGRESS NOTES
The patient has decided to leave AGAINST MEDICAL ADVICE.  He was explained the risks for his decision and he expressed understanding.  The patient was advised to resume his antiretroviral therapy as it covers both his ongoing HIV and newly diagnosed HBV infection.  In terms of his acute pancreatitis this seems to have been resolved.  He states that his pain is improved and he is tolerating oral intake.  He denies any nausea vomiting or diarrhea.  Again the patient is up AGAINST MEDICAL ADVICE.

## 2021-01-01 ENCOUNTER — APPOINTMENT (OUTPATIENT)
Dept: CARDIOLOGY | Facility: MEDICAL CENTER | Age: 51
DRG: 974 | End: 2021-01-01
Attending: INTERNAL MEDICINE
Payer: MEDICAID

## 2021-01-01 ENCOUNTER — HOSPITAL ENCOUNTER (INPATIENT)
Facility: MEDICAL CENTER | Age: 51
LOS: 3 days | DRG: 872 | End: 2021-05-10
Attending: EMERGENCY MEDICINE | Admitting: STUDENT IN AN ORGANIZED HEALTH CARE EDUCATION/TRAINING PROGRAM
Payer: MEDICAID

## 2021-01-01 ENCOUNTER — ANESTHESIA EVENT (OUTPATIENT)
Dept: CARDIOLOGY | Facility: MEDICAL CENTER | Age: 51
End: 2021-01-01

## 2021-01-01 ENCOUNTER — APPOINTMENT (OUTPATIENT)
Dept: RADIOLOGY | Facility: MEDICAL CENTER | Age: 51
DRG: 872 | End: 2021-01-01
Attending: EMERGENCY MEDICINE
Payer: MEDICAID

## 2021-01-01 ENCOUNTER — APPOINTMENT (OUTPATIENT)
Dept: RADIOLOGY | Facility: MEDICAL CENTER | Age: 51
DRG: 974 | End: 2021-01-01
Attending: INTERNAL MEDICINE
Payer: MEDICAID

## 2021-01-01 ENCOUNTER — APPOINTMENT (OUTPATIENT)
Dept: RADIOLOGY | Facility: MEDICAL CENTER | Age: 51
DRG: 872 | End: 2021-01-01
Attending: STUDENT IN AN ORGANIZED HEALTH CARE EDUCATION/TRAINING PROGRAM
Payer: MEDICAID

## 2021-01-01 ENCOUNTER — HOSPITAL ENCOUNTER (INPATIENT)
Facility: MEDICAL CENTER | Age: 51
LOS: 2 days | DRG: 974 | End: 2021-05-27
Attending: EMERGENCY MEDICINE | Admitting: STUDENT IN AN ORGANIZED HEALTH CARE EDUCATION/TRAINING PROGRAM
Payer: MEDICAID

## 2021-01-01 ENCOUNTER — ANESTHESIA (OUTPATIENT)
Dept: CARDIOLOGY | Facility: MEDICAL CENTER | Age: 51
End: 2021-01-01

## 2021-01-01 ENCOUNTER — PHARMACY VISIT (OUTPATIENT)
Dept: PHARMACY | Facility: MEDICAL CENTER | Age: 51
End: 2021-01-01
Payer: COMMERCIAL

## 2021-01-01 ENCOUNTER — APPOINTMENT (OUTPATIENT)
Dept: RADIOLOGY | Facility: MEDICAL CENTER | Age: 51
DRG: 974 | End: 2021-01-01
Attending: EMERGENCY MEDICINE
Payer: MEDICAID

## 2021-01-01 ENCOUNTER — APPOINTMENT (OUTPATIENT)
Dept: CARDIOLOGY | Facility: MEDICAL CENTER | Age: 51
DRG: 872 | End: 2021-01-01
Attending: STUDENT IN AN ORGANIZED HEALTH CARE EDUCATION/TRAINING PROGRAM
Payer: MEDICAID

## 2021-01-01 VITALS
DIASTOLIC BLOOD PRESSURE: 76 MMHG | HEART RATE: 86 BPM | OXYGEN SATURATION: 96 % | HEIGHT: 73 IN | RESPIRATION RATE: 18 BRPM | BODY MASS INDEX: 22.29 KG/M2 | SYSTOLIC BLOOD PRESSURE: 121 MMHG | WEIGHT: 168.21 LBS | TEMPERATURE: 97.2 F

## 2021-01-01 VITALS
BODY MASS INDEX: 22.38 KG/M2 | WEIGHT: 168.87 LBS | HEIGHT: 73 IN | OXYGEN SATURATION: 40 % | SYSTOLIC BLOOD PRESSURE: 50 MMHG | DIASTOLIC BLOOD PRESSURE: 20 MMHG | RESPIRATION RATE: 12 BRPM | TEMPERATURE: 97.5 F

## 2021-01-01 DIAGNOSIS — A41.9 SEPSIS, DUE TO UNSPECIFIED ORGANISM, UNSPECIFIED WHETHER ACUTE ORGAN DYSFUNCTION PRESENT (HCC): ICD-10-CM

## 2021-01-01 DIAGNOSIS — R50.9 FEVER, UNSPECIFIED FEVER CAUSE: ICD-10-CM

## 2021-01-01 DIAGNOSIS — B20 HIV INFECTION, UNSPECIFIED SYMPTOM STATUS (HCC): ICD-10-CM

## 2021-01-01 DIAGNOSIS — R65.20: ICD-10-CM

## 2021-01-01 DIAGNOSIS — J96.01: ICD-10-CM

## 2021-01-01 DIAGNOSIS — R25.1 TREMOR OF LEFT HAND: ICD-10-CM

## 2021-01-01 DIAGNOSIS — R01.1 CARDIAC MURMUR: ICD-10-CM

## 2021-01-01 DIAGNOSIS — R68.83 CHILLS: ICD-10-CM

## 2021-01-01 DIAGNOSIS — Z87.898 HISTORY OF BACTEREMIA: ICD-10-CM

## 2021-01-01 DIAGNOSIS — A40.1: ICD-10-CM

## 2021-01-01 LAB
ALBUMIN SERPL BCP-MCNC: 2.4 G/DL (ref 3.2–4.9)
ALBUMIN SERPL BCP-MCNC: 2.5 G/DL (ref 3.2–4.9)
ALBUMIN SERPL BCP-MCNC: 2.6 G/DL (ref 3.2–4.9)
ALBUMIN SERPL BCP-MCNC: 2.8 G/DL (ref 3.2–4.9)
ALBUMIN SERPL BCP-MCNC: 3.1 G/DL (ref 3.2–4.9)
ALBUMIN SERPL BCP-MCNC: 3.1 G/DL (ref 3.2–4.9)
ALBUMIN SERPL BCP-MCNC: 3.4 G/DL (ref 3.2–4.9)
ALBUMIN/GLOB SERPL: 0.8 G/DL
ALBUMIN/GLOB SERPL: 0.9 G/DL
ALBUMIN/GLOB SERPL: 0.9 G/DL
ALP SERPL-CCNC: 230 U/L (ref 30–99)
ALP SERPL-CCNC: 239 U/L (ref 30–99)
ALP SERPL-CCNC: 240 U/L (ref 30–99)
ALP SERPL-CCNC: 283 U/L (ref 30–99)
ALP SERPL-CCNC: 292 U/L (ref 30–99)
ALP SERPL-CCNC: 356 U/L (ref 30–99)
ALP SERPL-CCNC: 408 U/L (ref 30–99)
ALT SERPL-CCNC: 31 U/L (ref 2–50)
ALT SERPL-CCNC: 31 U/L (ref 2–50)
ALT SERPL-CCNC: 35 U/L (ref 2–50)
ALT SERPL-CCNC: 37 U/L (ref 2–50)
ALT SERPL-CCNC: 38 U/L (ref 2–50)
ALT SERPL-CCNC: 41 U/L (ref 2–50)
ALT SERPL-CCNC: 49 U/L (ref 2–50)
AMMONIA PLAS-SCNC: 22 UMOL/L (ref 11–45)
ANION GAP SERPL CALC-SCNC: 11 MMOL/L (ref 7–16)
ANION GAP SERPL CALC-SCNC: 12 MMOL/L (ref 7–16)
ANION GAP SERPL CALC-SCNC: 4 MMOL/L (ref 7–16)
ANION GAP SERPL CALC-SCNC: 5 MMOL/L (ref 7–16)
ANION GAP SERPL CALC-SCNC: 6 MMOL/L (ref 7–16)
ANION GAP SERPL CALC-SCNC: 6 MMOL/L (ref 7–16)
ANION GAP SERPL CALC-SCNC: 8 MMOL/L (ref 7–16)
ANNOTATION COMMENT IMP: ABNORMAL
APPEARANCE UR: CLEAR
APTT PPP: 37.3 SEC (ref 24.7–36)
AST SERPL-CCNC: 18 U/L (ref 12–45)
AST SERPL-CCNC: 26 U/L (ref 12–45)
AST SERPL-CCNC: 26 U/L (ref 12–45)
AST SERPL-CCNC: 29 U/L (ref 12–45)
AST SERPL-CCNC: 29 U/L (ref 12–45)
AST SERPL-CCNC: 38 U/L (ref 12–45)
AST SERPL-CCNC: 40 U/L (ref 12–45)
B-OH-BUTYR SERPL-MCNC: 0.06 MMOL/L (ref 0.02–0.27)
BACTERIA BLD CULT: ABNORMAL
BACTERIA BLD CULT: NORMAL
BACTERIA CSF CULT: NORMAL
BASE EXCESS BLDV CALC-SCNC: 0 MMOL/L
BASOPHILS # BLD AUTO: 0.2 % (ref 0–1.8)
BASOPHILS # BLD AUTO: 0.2 % (ref 0–1.8)
BASOPHILS # BLD AUTO: 0.3 % (ref 0–1.8)
BASOPHILS # BLD AUTO: 0.5 % (ref 0–1.8)
BASOPHILS # BLD AUTO: 0.7 % (ref 0–1.8)
BASOPHILS # BLD: 0.01 K/UL (ref 0–0.12)
BASOPHILS # BLD: 0.02 K/UL (ref 0–0.12)
BASOPHILS # BLD: 0.03 K/UL (ref 0–0.12)
BASOPHILS # BLD: 0.03 K/UL (ref 0–0.12)
BILIRUB SERPL-MCNC: 0.3 MG/DL (ref 0.1–1.5)
BILIRUB SERPL-MCNC: 0.3 MG/DL (ref 0.1–1.5)
BILIRUB SERPL-MCNC: 0.4 MG/DL (ref 0.1–1.5)
BILIRUB SERPL-MCNC: 0.5 MG/DL (ref 0.1–1.5)
BILIRUB SERPL-MCNC: 0.7 MG/DL (ref 0.1–1.5)
BILIRUB SERPL-MCNC: 0.9 MG/DL (ref 0.1–1.5)
BILIRUB SERPL-MCNC: 1.3 MG/DL (ref 0.1–1.5)
BILIRUB UR QL STRIP.AUTO: NEGATIVE
BLOOD CULTURE HOLD CXBCH: NORMAL
BODY TEMPERATURE: ABNORMAL CENTIGRADE
BUN SERPL-MCNC: 13 MG/DL (ref 8–22)
BUN SERPL-MCNC: 18 MG/DL (ref 8–22)
BUN SERPL-MCNC: 19 MG/DL (ref 8–22)
BUN SERPL-MCNC: 21 MG/DL (ref 8–22)
BUN SERPL-MCNC: 23 MG/DL (ref 8–22)
BUN SERPL-MCNC: 24 MG/DL (ref 8–22)
BUN SERPL-MCNC: 24 MG/DL (ref 8–22)
BUN SERPL-MCNC: 25 MG/DL (ref 8–22)
BUN SERPL-MCNC: 26 MG/DL (ref 8–22)
BURR CELLS/RBC NFR CSF MANUAL: 0 %
CALCIUM SERPL-MCNC: 7.5 MG/DL (ref 8.5–10.5)
CALCIUM SERPL-MCNC: 8 MG/DL (ref 8.5–10.5)
CALCIUM SERPL-MCNC: 8 MG/DL (ref 8.5–10.5)
CALCIUM SERPL-MCNC: 8.1 MG/DL (ref 8.5–10.5)
CALCIUM SERPL-MCNC: 8.2 MG/DL (ref 8.5–10.5)
CALCIUM SERPL-MCNC: 8.3 MG/DL (ref 8.5–10.5)
CALCIUM SERPL-MCNC: 8.4 MG/DL (ref 8.5–10.5)
CALCIUM SERPL-MCNC: 8.7 MG/DL (ref 8.5–10.5)
CALCIUM SERPL-MCNC: 8.7 MG/DL (ref 8.5–10.5)
CD3 CELLS # BLD: 982 CELLS/UL (ref 570–2400)
CD3+CD4+ CELLS # BLD: 96 CELLS/UL (ref 430–1800)
CD3+CD4+ CELLS/CD3+CD8+ CLL BLD: 0.11 RATIO (ref 0.8–3.9)
CD3+CD8+ CELLS # BLD: 841 CELLS/UL (ref 210–1200)
CHLORIDE SERPL-SCNC: 101 MMOL/L (ref 96–112)
CHLORIDE SERPL-SCNC: 102 MMOL/L (ref 96–112)
CHLORIDE SERPL-SCNC: 104 MMOL/L (ref 96–112)
CHLORIDE SERPL-SCNC: 90 MMOL/L (ref 96–112)
CHLORIDE SERPL-SCNC: 97 MMOL/L (ref 96–112)
CHLORIDE SERPL-SCNC: 97 MMOL/L (ref 96–112)
CHLORIDE SERPL-SCNC: 99 MMOL/L (ref 96–112)
CHOLEST SERPL-MCNC: 104 MG/DL (ref 100–199)
CLARITY CSF: CLEAR
CO2 SERPL-SCNC: 19 MMOL/L (ref 20–33)
CO2 SERPL-SCNC: 20 MMOL/L (ref 20–33)
CO2 SERPL-SCNC: 20 MMOL/L (ref 20–33)
CO2 SERPL-SCNC: 22 MMOL/L (ref 20–33)
CO2 SERPL-SCNC: 23 MMOL/L (ref 20–33)
CO2 SERPL-SCNC: 23 MMOL/L (ref 20–33)
CO2 SERPL-SCNC: 24 MMOL/L (ref 20–33)
CO2 SERPL-SCNC: 25 MMOL/L (ref 20–33)
CO2 SERPL-SCNC: 26 MMOL/L (ref 20–33)
COLOR CSF: COLORLESS
COLOR SPUN CSF: COLORLESS
COLOR UR: YELLOW
CREAT SERPL-MCNC: 0.54 MG/DL (ref 0.5–1.4)
CREAT SERPL-MCNC: 0.74 MG/DL (ref 0.5–1.4)
CREAT SERPL-MCNC: 0.75 MG/DL (ref 0.5–1.4)
CREAT SERPL-MCNC: 0.77 MG/DL (ref 0.5–1.4)
CREAT SERPL-MCNC: 0.87 MG/DL (ref 0.5–1.4)
CREAT SERPL-MCNC: 0.96 MG/DL (ref 0.5–1.4)
CREAT SERPL-MCNC: 1.04 MG/DL (ref 0.5–1.4)
CREAT SERPL-MCNC: 1.15 MG/DL (ref 0.5–1.4)
CREAT SERPL-MCNC: 1.27 MG/DL (ref 0.5–1.4)
CRP SERPL HS-MCNC: 2.82 MG/DL (ref 0–0.75)
CRYPTOC AG CSF QL LA: NEGATIVE
D DIMER PPP IA.FEU-MCNC: 0.44 UG/ML (FEU) (ref 0–0.5)
EKG IMPRESSION: NORMAL
EOSINOPHIL # BLD AUTO: 0.01 K/UL (ref 0–0.51)
EOSINOPHIL # BLD AUTO: 0.02 K/UL (ref 0–0.51)
EOSINOPHIL # BLD AUTO: 0.04 K/UL (ref 0–0.51)
EOSINOPHIL # BLD AUTO: 0.06 K/UL (ref 0–0.51)
EOSINOPHIL # BLD AUTO: 0.07 K/UL (ref 0–0.51)
EOSINOPHIL # BLD AUTO: 0.07 K/UL (ref 0–0.51)
EOSINOPHIL # BLD AUTO: 0.21 K/UL (ref 0–0.51)
EOSINOPHIL NFR BLD: 0.2 % (ref 0–6.9)
EOSINOPHIL NFR BLD: 0.2 % (ref 0–6.9)
EOSINOPHIL NFR BLD: 0.9 % (ref 0–6.9)
EOSINOPHIL NFR BLD: 1.1 % (ref 0–6.9)
EOSINOPHIL NFR BLD: 1.5 % (ref 0–6.9)
EOSINOPHIL NFR BLD: 1.8 % (ref 0–6.9)
EOSINOPHIL NFR BLD: 2.9 % (ref 0–6.9)
ERYTHROCYTE [DISTWIDTH] IN BLOOD BY AUTOMATED COUNT: 37.2 FL (ref 35.9–50)
ERYTHROCYTE [DISTWIDTH] IN BLOOD BY AUTOMATED COUNT: 38.8 FL (ref 35.9–50)
ERYTHROCYTE [DISTWIDTH] IN BLOOD BY AUTOMATED COUNT: 38.9 FL (ref 35.9–50)
ERYTHROCYTE [DISTWIDTH] IN BLOOD BY AUTOMATED COUNT: 39.1 FL (ref 35.9–50)
ERYTHROCYTE [DISTWIDTH] IN BLOOD BY AUTOMATED COUNT: 40.3 FL (ref 35.9–50)
ERYTHROCYTE [DISTWIDTH] IN BLOOD BY AUTOMATED COUNT: 40.4 FL (ref 35.9–50)
ERYTHROCYTE [DISTWIDTH] IN BLOOD BY AUTOMATED COUNT: 40.6 FL (ref 35.9–50)
ERYTHROCYTE [DISTWIDTH] IN BLOOD BY AUTOMATED COUNT: 40.8 FL (ref 35.9–50)
EST. AVERAGE GLUCOSE BLD GHB EST-MCNC: 292 MG/DL
FLUAV RNA SPEC QL NAA+PROBE: NEGATIVE
FLUAV RNA SPEC QL NAA+PROBE: NEGATIVE
FLUBV RNA SPEC QL NAA+PROBE: NEGATIVE
FLUBV RNA SPEC QL NAA+PROBE: NEGATIVE
GLOBULIN SER CALC-MCNC: 2.8 G/DL (ref 1.9–3.5)
GLOBULIN SER CALC-MCNC: 3.1 G/DL (ref 1.9–3.5)
GLOBULIN SER CALC-MCNC: 3.2 G/DL (ref 1.9–3.5)
GLOBULIN SER CALC-MCNC: 3.4 G/DL (ref 1.9–3.5)
GLOBULIN SER CALC-MCNC: 3.7 G/DL (ref 1.9–3.5)
GLOBULIN SER CALC-MCNC: 4 G/DL (ref 1.9–3.5)
GLOBULIN SER CALC-MCNC: 4.1 G/DL (ref 1.9–3.5)
GLUCOSE BLD-MCNC: 120 MG/DL (ref 65–99)
GLUCOSE BLD-MCNC: 141 MG/DL (ref 65–99)
GLUCOSE BLD-MCNC: 154 MG/DL (ref 65–99)
GLUCOSE BLD-MCNC: 162 MG/DL (ref 65–99)
GLUCOSE BLD-MCNC: 165 MG/DL (ref 65–99)
GLUCOSE BLD-MCNC: 171 MG/DL (ref 65–99)
GLUCOSE BLD-MCNC: 178 MG/DL (ref 65–99)
GLUCOSE BLD-MCNC: 184 MG/DL (ref 65–99)
GLUCOSE BLD-MCNC: 185 MG/DL (ref 65–99)
GLUCOSE BLD-MCNC: 191 MG/DL (ref 65–99)
GLUCOSE BLD-MCNC: 191 MG/DL (ref 65–99)
GLUCOSE BLD-MCNC: 192 MG/DL (ref 65–99)
GLUCOSE BLD-MCNC: 201 MG/DL (ref 65–99)
GLUCOSE BLD-MCNC: 204 MG/DL (ref 65–99)
GLUCOSE BLD-MCNC: 217 MG/DL (ref 65–99)
GLUCOSE BLD-MCNC: 231 MG/DL (ref 65–99)
GLUCOSE BLD-MCNC: 241 MG/DL (ref 65–99)
GLUCOSE BLD-MCNC: 245 MG/DL (ref 65–99)
GLUCOSE BLD-MCNC: 275 MG/DL (ref 65–99)
GLUCOSE BLD-MCNC: 281 MG/DL (ref 65–99)
GLUCOSE BLD-MCNC: 300 MG/DL (ref 65–99)
GLUCOSE BLD-MCNC: 315 MG/DL (ref 65–99)
GLUCOSE BLD-MCNC: 336 MG/DL (ref 65–99)
GLUCOSE BLD-MCNC: 344 MG/DL (ref 65–99)
GLUCOSE BLD-MCNC: 352 MG/DL (ref 65–99)
GLUCOSE CSF-MCNC: 168 MG/DL (ref 40–80)
GLUCOSE SERPL-MCNC: 100 MG/DL (ref 65–99)
GLUCOSE SERPL-MCNC: 160 MG/DL (ref 65–99)
GLUCOSE SERPL-MCNC: 177 MG/DL (ref 65–99)
GLUCOSE SERPL-MCNC: 193 MG/DL (ref 65–99)
GLUCOSE SERPL-MCNC: 206 MG/DL (ref 65–99)
GLUCOSE SERPL-MCNC: 245 MG/DL (ref 65–99)
GLUCOSE SERPL-MCNC: 281 MG/DL (ref 65–99)
GLUCOSE SERPL-MCNC: 281 MG/DL (ref 65–99)
GLUCOSE SERPL-MCNC: 347 MG/DL (ref 65–99)
GLUCOSE UR STRIP.AUTO-MCNC: >=1000 MG/DL
GRAM STN SPEC: NORMAL
GRAM STN SPEC: NORMAL
HBA1C MFR BLD: 11.8 % (ref 4–5.6)
HCO3 BLDV-SCNC: 24 MMOL/L (ref 24–28)
HCT VFR BLD AUTO: 35 % (ref 42–52)
HCT VFR BLD AUTO: 35.2 % (ref 42–52)
HCT VFR BLD AUTO: 35.6 % (ref 42–52)
HCT VFR BLD AUTO: 35.9 % (ref 42–52)
HCT VFR BLD AUTO: 36.7 % (ref 42–52)
HCT VFR BLD AUTO: 37 % (ref 42–52)
HCT VFR BLD AUTO: 37.2 % (ref 42–52)
HCT VFR BLD AUTO: 38.1 % (ref 42–52)
HDLC SERPL-MCNC: 21 MG/DL
HGB BLD-MCNC: 11.8 G/DL (ref 14–18)
HGB BLD-MCNC: 11.8 G/DL (ref 14–18)
HGB BLD-MCNC: 12 G/DL (ref 14–18)
HGB BLD-MCNC: 12.2 G/DL (ref 14–18)
HGB BLD-MCNC: 12.5 G/DL (ref 14–18)
HGB BLD-MCNC: 12.5 G/DL (ref 14–18)
HGB BLD-MCNC: 12.6 G/DL (ref 14–18)
HGB BLD-MCNC: 12.9 G/DL (ref 14–18)
HIV-1 NAAT (COPIES/ML) L204479A: ABNORMAL
HIV-1 NAAT (LOG COPIES/ML) L295410: 5.76 LOG CPY/ML
HIV1 RNA SERPL QL NAA+PROBE: DETECTED
HSV1+2 IGM CSF-ACNC: 0.2 IV
IMM GRANULOCYTES # BLD AUTO: 0.02 K/UL (ref 0–0.11)
IMM GRANULOCYTES # BLD AUTO: 0.03 K/UL (ref 0–0.11)
IMM GRANULOCYTES # BLD AUTO: 0.04 K/UL (ref 0–0.11)
IMM GRANULOCYTES NFR BLD AUTO: 0.3 % (ref 0–0.9)
IMM GRANULOCYTES NFR BLD AUTO: 0.4 % (ref 0–0.9)
IMM GRANULOCYTES NFR BLD AUTO: 0.5 % (ref 0–0.9)
IMM GRANULOCYTES NFR BLD AUTO: 0.5 % (ref 0–0.9)
INR PPP: 1.09 (ref 0.87–1.13)
INR PPP: 1.21 (ref 0.87–1.13)
KETONES UR STRIP.AUTO-MCNC: NEGATIVE MG/DL
LACTATE BLD-SCNC: 1.2 MMOL/L (ref 0.5–2)
LACTATE BLD-SCNC: 1.7 MMOL/L (ref 0.5–2)
LACTATE BLD-SCNC: 2.1 MMOL/L (ref 0.5–2)
LACTATE BLD-SCNC: 2.2 MMOL/L (ref 0.5–2)
LDLC SERPL CALC-MCNC: 62 MG/DL
LEUKOCYTE ESTERASE UR QL STRIP.AUTO: NEGATIVE
LV EJECT FRACT  99904: 40
LV EJECT FRACT  99904: 70
LV EJECT FRACT MOD 2C 99903: 74.3
LV EJECT FRACT MOD 4C 99902: 66.82
LV EJECT FRACT MOD BP 99901: 71.12
LYMPHOCYTES # BLD AUTO: 1.04 K/UL (ref 1–4.8)
LYMPHOCYTES # BLD AUTO: 1.13 K/UL (ref 1–4.8)
LYMPHOCYTES # BLD AUTO: 1.13 K/UL (ref 1–4.8)
LYMPHOCYTES # BLD AUTO: 1.18 K/UL (ref 1–4.8)
LYMPHOCYTES # BLD AUTO: 1.29 K/UL (ref 1–4.8)
LYMPHOCYTES # BLD AUTO: 1.66 K/UL (ref 1–4.8)
LYMPHOCYTES # BLD AUTO: 1.71 K/UL (ref 1–4.8)
LYMPHOCYTES NFR BLD: 10.9 % (ref 22–41)
LYMPHOCYTES NFR BLD: 20.3 % (ref 22–41)
LYMPHOCYTES NFR BLD: 21.9 % (ref 22–41)
LYMPHOCYTES NFR BLD: 23.3 % (ref 22–41)
LYMPHOCYTES NFR BLD: 24.9 % (ref 22–41)
LYMPHOCYTES NFR BLD: 29.9 % (ref 22–41)
LYMPHOCYTES NFR BLD: 41.9 % (ref 22–41)
LYMPHOCYTES NFR CSF: 80 %
MAGNESIUM SERPL-MCNC: 1.8 MG/DL (ref 1.5–2.5)
MCH RBC QN AUTO: 26.8 PG (ref 27–33)
MCH RBC QN AUTO: 27.2 PG (ref 27–33)
MCH RBC QN AUTO: 27.3 PG (ref 27–33)
MCH RBC QN AUTO: 27.3 PG (ref 27–33)
MCH RBC QN AUTO: 27.6 PG (ref 27–33)
MCH RBC QN AUTO: 27.7 PG (ref 27–33)
MCH RBC QN AUTO: 27.9 PG (ref 27–33)
MCH RBC QN AUTO: 28.2 PG (ref 27–33)
MCHC RBC AUTO-ENTMCNC: 32.8 G/DL (ref 33.7–35.3)
MCHC RBC AUTO-ENTMCNC: 33.4 G/DL (ref 33.7–35.3)
MCHC RBC AUTO-ENTMCNC: 33.5 G/DL (ref 33.7–35.3)
MCHC RBC AUTO-ENTMCNC: 33.7 G/DL (ref 33.7–35.3)
MCHC RBC AUTO-ENTMCNC: 33.9 G/DL (ref 33.7–35.3)
MCHC RBC AUTO-ENTMCNC: 34.1 G/DL (ref 33.7–35.3)
MCHC RBC AUTO-ENTMCNC: 34.3 G/DL (ref 33.7–35.3)
MCHC RBC AUTO-ENTMCNC: 34.9 G/DL (ref 33.7–35.3)
MCV RBC AUTO: 80.2 FL (ref 81.4–97.8)
MCV RBC AUTO: 80.9 FL (ref 81.4–97.8)
MCV RBC AUTO: 81 FL (ref 81.4–97.8)
MCV RBC AUTO: 81.5 FL (ref 81.4–97.8)
MCV RBC AUTO: 81.6 FL (ref 81.4–97.8)
MCV RBC AUTO: 81.6 FL (ref 81.4–97.8)
MCV RBC AUTO: 81.9 FL (ref 81.4–97.8)
MCV RBC AUTO: 82 FL (ref 81.4–97.8)
MICRO URNS: ABNORMAL
MONOCYTES # BLD AUTO: 0.38 K/UL (ref 0–0.85)
MONOCYTES # BLD AUTO: 0.43 K/UL (ref 0–0.85)
MONOCYTES # BLD AUTO: 0.48 K/UL (ref 0–0.85)
MONOCYTES # BLD AUTO: 0.61 K/UL (ref 0–0.85)
MONOCYTES # BLD AUTO: 0.65 K/UL (ref 0–0.85)
MONOCYTES # BLD AUTO: 0.69 K/UL (ref 0–0.85)
MONOCYTES # BLD AUTO: 0.79 K/UL (ref 0–0.85)
MONOCYTES NFR BLD AUTO: 10.2 % (ref 0–13.4)
MONOCYTES NFR BLD AUTO: 10.8 % (ref 0–13.4)
MONOCYTES NFR BLD AUTO: 10.9 % (ref 0–13.4)
MONOCYTES NFR BLD AUTO: 15.2 % (ref 0–13.4)
MONOCYTES NFR BLD AUTO: 6.4 % (ref 0–13.4)
MONOCYTES NFR BLD AUTO: 9.3 % (ref 0–13.4)
MONOCYTES NFR BLD AUTO: 9.6 % (ref 0–13.4)
MONONUC CELLS NFR CSF: 20 %
NEUTROPHILS # BLD AUTO: 1.76 K/UL (ref 1.82–7.42)
NEUTROPHILS # BLD AUTO: 2.29 K/UL (ref 1.82–7.42)
NEUTROPHILS # BLD AUTO: 2.62 K/UL (ref 1.82–7.42)
NEUTROPHILS # BLD AUTO: 3.52 K/UL (ref 1.82–7.42)
NEUTROPHILS # BLD AUTO: 4.3 K/UL (ref 1.82–7.42)
NEUTROPHILS # BLD AUTO: 4.58 K/UL (ref 1.82–7.42)
NEUTROPHILS # BLD AUTO: 7.82 K/UL (ref 1.82–7.42)
NEUTROPHILS NFR BLD: 44.4 % (ref 44–72)
NEUTROPHILS NFR BLD: 57.9 % (ref 44–72)
NEUTROPHILS NFR BLD: 58 % (ref 44–72)
NEUTROPHILS NFR BLD: 62.3 % (ref 44–72)
NEUTROPHILS NFR BLD: 67.6 % (ref 44–72)
NEUTROPHILS NFR BLD: 68 % (ref 44–72)
NEUTROPHILS NFR BLD: 81.9 % (ref 44–72)
NITRITE UR QL STRIP.AUTO: NEGATIVE
NRBC # BLD AUTO: 0 K/UL
NRBC BLD-RTO: 0 /100 WBC
NT-PROBNP SERPL IA-MCNC: ABNORMAL PG/ML (ref 0–125)
OSMOLALITY SERPL: 281 MOSM/KG H2O (ref 278–298)
OSMOLALITY SERPL: 284 MOSM/KG H2O (ref 278–298)
OSMOLALITY SERPL: 288 MOSM/KG H2O (ref 278–298)
PCO2 BLDV: 35.3 MMHG (ref 41–51)
PH BLDV: 7.44 [PH] (ref 7.31–7.45)
PH UR STRIP.AUTO: 5.5 [PH] (ref 5–8)
PHOSPHATE SERPL-MCNC: 2.1 MG/DL (ref 2.5–4.5)
PHOSPHATE SERPL-MCNC: 2.9 MG/DL (ref 2.5–4.5)
PLATELET # BLD AUTO: 132 K/UL (ref 164–446)
PLATELET # BLD AUTO: 135 K/UL (ref 164–446)
PLATELET # BLD AUTO: 144 K/UL (ref 164–446)
PLATELET # BLD AUTO: 157 K/UL (ref 164–446)
PLATELET # BLD AUTO: 171 K/UL (ref 164–446)
PLATELET # BLD AUTO: 177 K/UL (ref 164–446)
PLATELET # BLD AUTO: 179 K/UL (ref 164–446)
PLATELET # BLD AUTO: 180 K/UL (ref 164–446)
PMV BLD AUTO: 10.2 FL (ref 9–12.9)
PMV BLD AUTO: 10.3 FL (ref 9–12.9)
PMV BLD AUTO: 10.3 FL (ref 9–12.9)
PMV BLD AUTO: 10.5 FL (ref 9–12.9)
PMV BLD AUTO: 10.6 FL (ref 9–12.9)
PMV BLD AUTO: 10.8 FL (ref 9–12.9)
PMV BLD AUTO: 10.9 FL (ref 9–12.9)
PMV BLD AUTO: 12.2 FL (ref 9–12.9)
PO2 BLDV: 42.4 MMHG (ref 25–40)
POTASSIUM SERPL-SCNC: 3.9 MMOL/L (ref 3.6–5.5)
POTASSIUM SERPL-SCNC: 4 MMOL/L (ref 3.6–5.5)
POTASSIUM SERPL-SCNC: 4.1 MMOL/L (ref 3.6–5.5)
POTASSIUM SERPL-SCNC: 4.2 MMOL/L (ref 3.6–5.5)
POTASSIUM SERPL-SCNC: 4.2 MMOL/L (ref 3.6–5.5)
POTASSIUM SERPL-SCNC: 4.3 MMOL/L (ref 3.6–5.5)
POTASSIUM SERPL-SCNC: 4.4 MMOL/L (ref 3.6–5.5)
PROCALCITONIN SERPL-MCNC: 0.22 NG/ML
PROCALCITONIN SERPL-MCNC: 0.25 NG/ML
PROCALCITONIN SERPL-MCNC: 0.27 NG/ML
PROT CSF-MCNC: 47 MG/DL (ref 15–45)
PROT SERPL-MCNC: 5.2 G/DL (ref 6–8.2)
PROT SERPL-MCNC: 5.6 G/DL (ref 6–8.2)
PROT SERPL-MCNC: 5.8 G/DL (ref 6–8.2)
PROT SERPL-MCNC: 6.5 G/DL (ref 6–8.2)
PROT SERPL-MCNC: 6.5 G/DL (ref 6–8.2)
PROT SERPL-MCNC: 7.1 G/DL (ref 6–8.2)
PROT SERPL-MCNC: 7.5 G/DL (ref 6–8.2)
PROT UR QL STRIP: NEGATIVE MG/DL
PROTHROMBIN TIME: 14.4 SEC (ref 12–14.6)
PROTHROMBIN TIME: 15.7 SEC (ref 12–14.6)
RBC # BLD AUTO: 4.27 M/UL (ref 4.7–6.1)
RBC # BLD AUTO: 4.32 M/UL (ref 4.7–6.1)
RBC # BLD AUTO: 4.4 M/UL (ref 4.7–6.1)
RBC # BLD AUTO: 4.4 M/UL (ref 4.7–6.1)
RBC # BLD AUTO: 4.48 M/UL (ref 4.7–6.1)
RBC # BLD AUTO: 4.57 M/UL (ref 4.7–6.1)
RBC # BLD AUTO: 4.64 M/UL (ref 4.7–6.1)
RBC # BLD AUTO: 4.67 M/UL (ref 4.7–6.1)
RBC # CSF: 3 CELLS/UL
RBC UR QL AUTO: NEGATIVE
RSV RNA SPEC QL NAA+PROBE: NEGATIVE
RSV RNA SPEC QL NAA+PROBE: NEGATIVE
SAO2 % BLDV: 82.2 %
SARS-COV-2 RNA RESP QL NAA+PROBE: NOTDETECTED
SARS-COV-2 RNA RESP QL NAA+PROBE: NOTDETECTED
SIGNIFICANT IND 70042: ABNORMAL
SIGNIFICANT IND 70042: NORMAL
SITE SITE: ABNORMAL
SITE SITE: NORMAL
SODIUM SERPL-SCNC: 126 MMOL/L (ref 135–145)
SODIUM SERPL-SCNC: 127 MMOL/L (ref 135–145)
SODIUM SERPL-SCNC: 129 MMOL/L (ref 135–145)
SODIUM SERPL-SCNC: 129 MMOL/L (ref 135–145)
SODIUM SERPL-SCNC: 130 MMOL/L (ref 135–145)
SODIUM SERPL-SCNC: 130 MMOL/L (ref 135–145)
SODIUM SERPL-SCNC: 131 MMOL/L (ref 135–145)
SOURCE SOURCE: ABNORMAL
SOURCE SOURCE: NORMAL
SP GR UR STRIP.AUTO: 1.03
SPECIMEN SOURCE: NORMAL
SPECIMEN SOURCE: NORMAL
SPECIMEN VOL CSF: 6 ML
TRIGL SERPL-MCNC: 104 MG/DL (ref 0–149)
TROPONIN T SERPL-MCNC: 58 NG/L (ref 6–19)
TUBE # CSF: 3
TUBE # CSF: 4
UROBILINOGEN UR STRIP.AUTO-MCNC: 2 MG/DL
WBC # BLD AUTO: 4 K/UL (ref 4.8–10.8)
WBC # BLD AUTO: 4 K/UL (ref 4.8–10.8)
WBC # BLD AUTO: 4.5 K/UL (ref 4.8–10.8)
WBC # BLD AUTO: 5.2 K/UL (ref 4.8–10.8)
WBC # BLD AUTO: 5.6 K/UL (ref 4.8–10.8)
WBC # BLD AUTO: 6.4 K/UL (ref 4.8–10.8)
WBC # BLD AUTO: 7.3 K/UL (ref 4.8–10.8)
WBC # BLD AUTO: 9.6 K/UL (ref 4.8–10.8)
WBC # CSF: 3 CELLS/UL (ref 0–10)

## 2021-01-01 PROCEDURE — 0241U HCHG SARS-COV-2 COVID-19 NFCT DS RESP RNA 4 TRGT MIC: CPT

## 2021-01-01 PROCEDURE — 36620 INSERTION CATHETER ARTERY: CPT

## 2021-01-01 PROCEDURE — A9270 NON-COVERED ITEM OR SERVICE: HCPCS | Performed by: INTERNAL MEDICINE

## 2021-01-01 PROCEDURE — 82962 GLUCOSE BLOOD TEST: CPT | Mod: 91

## 2021-01-01 PROCEDURE — 700102 HCHG RX REV CODE 250 W/ 637 OVERRIDE(OP): Performed by: STUDENT IN AN ORGANIZED HEALTH CARE EDUCATION/TRAINING PROGRAM

## 2021-01-01 PROCEDURE — 700111 HCHG RX REV CODE 636 W/ 250 OVERRIDE (IP): Performed by: INTERNAL MEDICINE

## 2021-01-01 PROCEDURE — 85025 COMPLETE CBC W/AUTO DIFF WBC: CPT

## 2021-01-01 PROCEDURE — 36415 COLL VENOUS BLD VENIPUNCTURE: CPT

## 2021-01-01 PROCEDURE — 82945 GLUCOSE OTHER FLUID: CPT

## 2021-01-01 PROCEDURE — 83930 ASSAY OF BLOOD OSMOLALITY: CPT

## 2021-01-01 PROCEDURE — 87077 CULTURE AEROBIC IDENTIFY: CPT

## 2021-01-01 PROCEDURE — 99233 SBSQ HOSP IP/OBS HIGH 50: CPT | Performed by: INTERNAL MEDICINE

## 2021-01-01 PROCEDURE — 770020 HCHG ROOM/CARE - TELE (206)

## 2021-01-01 PROCEDURE — 80048 BASIC METABOLIC PNL TOTAL CA: CPT

## 2021-01-01 PROCEDURE — 83605 ASSAY OF LACTIC ACID: CPT

## 2021-01-01 PROCEDURE — 700102 HCHG RX REV CODE 250 W/ 637 OVERRIDE(OP): Performed by: INTERNAL MEDICINE

## 2021-01-01 PROCEDURE — 82803 BLOOD GASES ANY COMBINATION: CPT

## 2021-01-01 PROCEDURE — A9270 NON-COVERED ITEM OR SERVICE: HCPCS | Performed by: STUDENT IN AN ORGANIZED HEALTH CARE EDUCATION/TRAINING PROGRAM

## 2021-01-01 PROCEDURE — 93325 DOPPLER ECHO COLOR FLOW MAPG: CPT

## 2021-01-01 PROCEDURE — 36620 INSERTION CATHETER ARTERY: CPT | Performed by: INTERNAL MEDICINE

## 2021-01-01 PROCEDURE — 700105 HCHG RX REV CODE 258: Performed by: INTERNAL MEDICINE

## 2021-01-01 PROCEDURE — 71045 X-RAY EXAM CHEST 1 VIEW: CPT

## 2021-01-01 PROCEDURE — 99255 IP/OBS CONSLTJ NEW/EST HI 80: CPT | Performed by: INTERNAL MEDICINE

## 2021-01-01 PROCEDURE — 84484 ASSAY OF TROPONIN QUANT: CPT

## 2021-01-01 PROCEDURE — 93010 ELECTROCARDIOGRAM REPORT: CPT | Performed by: INTERNAL MEDICINE

## 2021-01-01 PROCEDURE — 96366 THER/PROPH/DIAG IV INF ADDON: CPT

## 2021-01-01 PROCEDURE — 160002 HCHG RECOVERY MINUTES (STAT)

## 2021-01-01 PROCEDURE — 93005 ELECTROCARDIOGRAM TRACING: CPT | Performed by: INTERNAL MEDICINE

## 2021-01-01 PROCEDURE — 700111 HCHG RX REV CODE 636 W/ 250 OVERRIDE (IP): Performed by: STUDENT IN AN ORGANIZED HEALTH CARE EDUCATION/TRAINING PROGRAM

## 2021-01-01 PROCEDURE — 83880 ASSAY OF NATRIURETIC PEPTIDE: CPT

## 2021-01-01 PROCEDURE — 700111 HCHG RX REV CODE 636 W/ 250 OVERRIDE (IP)

## 2021-01-01 PROCEDURE — 03HY32Z INSERTION OF MONITORING DEVICE INTO UPPER ARTERY, PERCUTANEOUS APPROACH: ICD-10-PCS | Performed by: INTERNAL MEDICINE

## 2021-01-01 PROCEDURE — 86360 T CELL ABSOLUTE COUNT/RATIO: CPT

## 2021-01-01 PROCEDURE — 82962 GLUCOSE BLOOD TEST: CPT

## 2021-01-01 PROCEDURE — 99232 SBSQ HOSP IP/OBS MODERATE 35: CPT | Performed by: INTERNAL MEDICINE

## 2021-01-01 PROCEDURE — 700105 HCHG RX REV CODE 258: Performed by: EMERGENCY MEDICINE

## 2021-01-01 PROCEDURE — 87040 BLOOD CULTURE FOR BACTERIA: CPT

## 2021-01-01 PROCEDURE — 4A133J1 MONITORING OF ARTERIAL PULSE, PERIPHERAL, PERCUTANEOUS APPROACH: ICD-10-PCS | Performed by: INTERNAL MEDICINE

## 2021-01-01 PROCEDURE — 93010 ELECTROCARDIOGRAM REPORT: CPT | Mod: 76 | Performed by: INTERNAL MEDICINE

## 2021-01-01 PROCEDURE — 85379 FIBRIN DEGRADATION QUANT: CPT

## 2021-01-01 PROCEDURE — 83735 ASSAY OF MAGNESIUM: CPT

## 2021-01-01 PROCEDURE — 93306 TTE W/DOPPLER COMPLETE: CPT | Mod: 26 | Performed by: INTERNAL MEDICINE

## 2021-01-01 PROCEDURE — 80053 COMPREHEN METABOLIC PANEL: CPT

## 2021-01-01 PROCEDURE — RXMED WILLOW AMBULATORY MEDICATION CHARGE: Performed by: INTERNAL MEDICINE

## 2021-01-01 PROCEDURE — 99232 SBSQ HOSP IP/OBS MODERATE 35: CPT | Mod: 25 | Performed by: INTERNAL MEDICINE

## 2021-01-01 PROCEDURE — 93005 ELECTROCARDIOGRAM TRACING: CPT | Performed by: STUDENT IN AN ORGANIZED HEALTH CARE EDUCATION/TRAINING PROGRAM

## 2021-01-01 PROCEDURE — 84145 PROCALCITONIN (PCT): CPT

## 2021-01-01 PROCEDURE — 82010 KETONE BODYS QUAN: CPT

## 2021-01-01 PROCEDURE — 96365 THER/PROPH/DIAG IV INF INIT: CPT

## 2021-01-01 PROCEDURE — 700105 HCHG RX REV CODE 258

## 2021-01-01 PROCEDURE — 87186 SC STD MICRODIL/AGAR DIL: CPT

## 2021-01-01 PROCEDURE — C9803 HOPD COVID-19 SPEC COLLECT: HCPCS | Performed by: EMERGENCY MEDICINE

## 2021-01-01 PROCEDURE — 3E033XZ INTRODUCTION OF VASOPRESSOR INTO PERIPHERAL VEIN, PERCUTANEOUS APPROACH: ICD-10-PCS | Performed by: INTERNAL MEDICINE

## 2021-01-01 PROCEDURE — 80061 LIPID PANEL: CPT

## 2021-01-01 PROCEDURE — A9576 INJ PROHANCE MULTIPACK: HCPCS | Performed by: STUDENT IN AN ORGANIZED HEALTH CARE EDUCATION/TRAINING PROGRAM

## 2021-01-01 PROCEDURE — 93325 DOPPLER ECHO COLOR FLOW MAPG: CPT | Mod: 26 | Performed by: INTERNAL MEDICINE

## 2021-01-01 PROCEDURE — 700102 HCHG RX REV CODE 250 W/ 637 OVERRIDE(OP): Performed by: EMERGENCY MEDICINE

## 2021-01-01 PROCEDURE — 87536 HIV-1 QUANT&REVRSE TRNSCRPJ: CPT

## 2021-01-01 PROCEDURE — 770004 HCHG ROOM/CARE - ONCOLOGY PRIVATE *

## 2021-01-01 PROCEDURE — 86359 T CELLS TOTAL COUNT: CPT

## 2021-01-01 PROCEDURE — 85610 PROTHROMBIN TIME: CPT

## 2021-01-01 PROCEDURE — 81003 URINALYSIS AUTO W/O SCOPE: CPT

## 2021-01-01 PROCEDURE — 99285 EMERGENCY DEPT VISIT HI MDM: CPT

## 2021-01-01 PROCEDURE — 96375 TX/PRO/DX INJ NEW DRUG ADDON: CPT

## 2021-01-01 PROCEDURE — 700105 HCHG RX REV CODE 258: Performed by: STUDENT IN AN ORGANIZED HEALTH CARE EDUCATION/TRAINING PROGRAM

## 2021-01-01 PROCEDURE — 99254 IP/OBS CNSLTJ NEW/EST MOD 60: CPT | Performed by: INTERNAL MEDICINE

## 2021-01-01 PROCEDURE — 87116 MYCOBACTERIA CULTURE: CPT

## 2021-01-01 PROCEDURE — 87070 CULTURE OTHR SPECIMN AEROBIC: CPT

## 2021-01-01 PROCEDURE — 99239 HOSP IP/OBS DSCHRG MGMT >30: CPT | Performed by: INTERNAL MEDICINE

## 2021-01-01 PROCEDURE — 4A133B1 MONITORING OF ARTERIAL PRESSURE, PERIPHERAL, PERCUTANEOUS APPROACH: ICD-10-PCS | Performed by: INTERNAL MEDICINE

## 2021-01-01 PROCEDURE — 70553 MRI BRAIN STEM W/O & W/DYE: CPT

## 2021-01-01 PROCEDURE — 93308 TTE F-UP OR LMTD: CPT | Mod: 26 | Performed by: INTERNAL MEDICINE

## 2021-01-01 PROCEDURE — 84100 ASSAY OF PHOSPHORUS: CPT

## 2021-01-01 PROCEDURE — 93306 TTE W/DOPPLER COMPLETE: CPT

## 2021-01-01 PROCEDURE — 83036 HEMOGLOBIN GLYCOSYLATED A1C: CPT

## 2021-01-01 PROCEDURE — 700111 HCHG RX REV CODE 636 W/ 250 OVERRIDE (IP): Performed by: EMERGENCY MEDICINE

## 2021-01-01 PROCEDURE — 86403 PARTICLE AGGLUT ANTBDY SCRN: CPT

## 2021-01-01 PROCEDURE — 87040 BLOOD CULTURE FOR BACTERIA: CPT | Mod: 91

## 2021-01-01 PROCEDURE — 99291 CRITICAL CARE FIRST HOUR: CPT | Mod: 25 | Performed by: INTERNAL MEDICINE

## 2021-01-01 PROCEDURE — 96367 TX/PROPH/DG ADDL SEQ IV INF: CPT

## 2021-01-01 PROCEDURE — 770001 HCHG ROOM/CARE - MED/SURG/GYN PRIV*

## 2021-01-01 PROCEDURE — 86694 HERPES SIMPLEX NES ANTBDY: CPT

## 2021-01-01 PROCEDURE — 99223 1ST HOSP IP/OBS HIGH 75: CPT | Performed by: STUDENT IN AN ORGANIZED HEALTH CARE EDUCATION/TRAINING PROGRAM

## 2021-01-01 PROCEDURE — 85730 THROMBOPLASTIN TIME PARTIAL: CPT

## 2021-01-01 PROCEDURE — 700117 HCHG RX CONTRAST REV CODE 255: Performed by: STUDENT IN AN ORGANIZED HEALTH CARE EDUCATION/TRAINING PROGRAM

## 2021-01-01 PROCEDURE — 99238 HOSP IP/OBS DSCHRG MGMT 30/<: CPT | Mod: 25 | Performed by: INTERNAL MEDICINE

## 2021-01-01 PROCEDURE — 84157 ASSAY OF PROTEIN OTHER: CPT

## 2021-01-01 PROCEDURE — 93005 ELECTROCARDIOGRAM TRACING: CPT | Performed by: EMERGENCY MEDICINE

## 2021-01-01 PROCEDURE — 93321 DOPPLER ECHO F-UP/LMTD STD: CPT | Mod: 26 | Performed by: INTERNAL MEDICINE

## 2021-01-01 PROCEDURE — 71046 X-RAY EXAM CHEST 2 VIEWS: CPT

## 2021-01-01 PROCEDURE — 82140 ASSAY OF AMMONIA: CPT

## 2021-01-01 PROCEDURE — 70450 CT HEAD/BRAIN W/O DYE: CPT

## 2021-01-01 PROCEDURE — 99255 IP/OBS CONSLTJ NEW/EST HI 80: CPT | Performed by: THORACIC SURGERY (CARDIOTHORACIC VASCULAR SURGERY)

## 2021-01-01 PROCEDURE — 86140 C-REACTIVE PROTEIN: CPT

## 2021-01-01 PROCEDURE — 93312 ECHO TRANSESOPHAGEAL: CPT | Mod: 26 | Performed by: INTERNAL MEDICINE

## 2021-01-01 PROCEDURE — 700101 HCHG RX REV CODE 250: Performed by: INTERNAL MEDICINE

## 2021-01-01 PROCEDURE — A9270 NON-COVERED ITEM OR SERVICE: HCPCS | Performed by: EMERGENCY MEDICINE

## 2021-01-01 PROCEDURE — 62270 DX LMBR SPI PNXR: CPT

## 2021-01-01 PROCEDURE — 85027 COMPLETE CBC AUTOMATED: CPT

## 2021-01-01 PROCEDURE — 87205 SMEAR GRAM STAIN: CPT

## 2021-01-01 PROCEDURE — 700111 HCHG RX REV CODE 636 W/ 250 OVERRIDE (IP): Performed by: ANESTHESIOLOGY

## 2021-01-01 PROCEDURE — 89051 BODY FLUID CELL COUNT: CPT

## 2021-01-01 RX ORDER — PROMETHAZINE HYDROCHLORIDE 25 MG/1
12.5-25 TABLET ORAL EVERY 4 HOURS PRN
Status: DISCONTINUED | OUTPATIENT
Start: 2021-01-01 | End: 2021-01-01 | Stop reason: HOSPADM

## 2021-01-01 RX ORDER — MIDODRINE HYDROCHLORIDE 5 MG/1
5 TABLET ORAL ONCE
Status: COMPLETED | OUTPATIENT
Start: 2021-01-01 | End: 2021-01-01

## 2021-01-01 RX ORDER — SODIUM CHLORIDE, SODIUM LACTATE, POTASSIUM CHLORIDE, CALCIUM CHLORIDE 600; 310; 30; 20 MG/100ML; MG/100ML; MG/100ML; MG/100ML
1000 INJECTION, SOLUTION INTRAVENOUS ONCE
Status: COMPLETED | OUTPATIENT
Start: 2021-01-01 | End: 2021-01-01

## 2021-01-01 RX ORDER — HYDROMORPHONE HYDROCHLORIDE 1 MG/ML
0.25 INJECTION, SOLUTION INTRAMUSCULAR; INTRAVENOUS; SUBCUTANEOUS
Status: DISCONTINUED | OUTPATIENT
Start: 2021-01-01 | End: 2021-01-01 | Stop reason: HOSPADM

## 2021-01-01 RX ORDER — SODIUM CHLORIDE, SODIUM LACTATE, POTASSIUM CHLORIDE, CALCIUM CHLORIDE 600; 310; 30; 20 MG/100ML; MG/100ML; MG/100ML; MG/100ML
INJECTION, SOLUTION INTRAVENOUS CONTINUOUS
Status: DISCONTINUED | OUTPATIENT
Start: 2021-01-01 | End: 2021-01-01

## 2021-01-01 RX ORDER — DEXTROSE MONOHYDRATE 25 G/50ML
50 INJECTION, SOLUTION INTRAVENOUS
Status: DISCONTINUED | OUTPATIENT
Start: 2021-01-01 | End: 2021-01-01 | Stop reason: HOSPADM

## 2021-01-01 RX ORDER — CALCIUM CHLORIDE 100 MG/ML
INJECTION INTRAVENOUS; INTRAVENTRICULAR
Status: COMPLETED | OUTPATIENT
Start: 2021-01-01 | End: 2021-01-01

## 2021-01-01 RX ORDER — GABAPENTIN 300 MG/1
300-1200 CAPSULE ORAL 3 TIMES DAILY
COMMUNITY

## 2021-01-01 RX ORDER — ACETAMINOPHEN 325 MG/1
650 TABLET ORAL EVERY 6 HOURS PRN
Status: DISCONTINUED | OUTPATIENT
Start: 2021-01-01 | End: 2021-01-01 | Stop reason: HOSPADM

## 2021-01-01 RX ORDER — INSULIN GLARGINE 100 [IU]/ML
15 INJECTION, SOLUTION SUBCUTANEOUS EVERY EVENING
Qty: 6 ML | Refills: 0 | Status: SHIPPED | OUTPATIENT
Start: 2021-01-01 | End: 2021-06-09

## 2021-01-01 RX ORDER — ATORVASTATIN CALCIUM 40 MG/1
40 TABLET, FILM COATED ORAL DAILY
Status: DISCONTINUED | OUTPATIENT
Start: 2021-01-01 | End: 2021-01-01 | Stop reason: HOSPADM

## 2021-01-01 RX ORDER — SODIUM CHLORIDE, SODIUM LACTATE, POTASSIUM CHLORIDE, AND CALCIUM CHLORIDE .6; .31; .03; .02 G/100ML; G/100ML; G/100ML; G/100ML
1244 INJECTION, SOLUTION INTRAVENOUS
Status: DISCONTINUED | OUTPATIENT
Start: 2021-01-01 | End: 2021-01-01

## 2021-01-01 RX ORDER — POLYETHYLENE GLYCOL 3350 17 G/17G
1 POWDER, FOR SOLUTION ORAL
Status: DISCONTINUED | OUTPATIENT
Start: 2021-01-01 | End: 2021-01-01 | Stop reason: HOSPADM

## 2021-01-01 RX ORDER — MAGNESIUM SULFATE HEPTAHYDRATE 40 MG/ML
2 INJECTION, SOLUTION INTRAVENOUS ONCE
Status: COMPLETED | OUTPATIENT
Start: 2021-01-01 | End: 2021-01-01

## 2021-01-01 RX ORDER — PROCHLORPERAZINE EDISYLATE 5 MG/ML
5-10 INJECTION INTRAMUSCULAR; INTRAVENOUS EVERY 4 HOURS PRN
Status: DISCONTINUED | OUTPATIENT
Start: 2021-01-01 | End: 2021-01-01 | Stop reason: HOSPADM

## 2021-01-01 RX ORDER — EPINEPHRINE IN SOD CHLOR,ISO 1 MG/10 ML
SYRINGE (ML) INTRAVENOUS
Status: COMPLETED | OUTPATIENT
Start: 2021-01-01 | End: 2021-01-01

## 2021-01-01 RX ORDER — SULFAMETHOXAZOLE AND TRIMETHOPRIM 800; 160 MG/1; MG/1
1 TABLET ORAL DAILY
Status: DISCONTINUED | OUTPATIENT
Start: 2021-01-01 | End: 2021-01-01 | Stop reason: HOSPADM

## 2021-01-01 RX ORDER — GUAIFENESIN/DEXTROMETHORPHAN 100-10MG/5
10 SYRUP ORAL EVERY 6 HOURS PRN
Status: DISCONTINUED | OUTPATIENT
Start: 2021-01-01 | End: 2021-01-01 | Stop reason: HOSPADM

## 2021-01-01 RX ORDER — MORPHINE SULFATE 4 MG/ML
4 INJECTION, SOLUTION INTRAMUSCULAR; INTRAVENOUS ONCE
Status: COMPLETED | OUTPATIENT
Start: 2021-01-01 | End: 2021-01-01

## 2021-01-01 RX ORDER — BISACODYL 10 MG
10 SUPPOSITORY, RECTAL RECTAL
Status: DISCONTINUED | OUTPATIENT
Start: 2021-01-01 | End: 2021-01-01 | Stop reason: HOSPADM

## 2021-01-01 RX ORDER — INSULIN LISPRO 100 [IU]/ML
2-9 INJECTION, SOLUTION INTRAVENOUS; SUBCUTANEOUS
Status: DISCONTINUED | OUTPATIENT
Start: 2021-01-01 | End: 2021-01-01 | Stop reason: HOSPADM

## 2021-01-01 RX ORDER — SODIUM CHLORIDE 9 MG/ML
1000 INJECTION, SOLUTION INTRAVENOUS ONCE
Status: COMPLETED | OUTPATIENT
Start: 2021-01-01 | End: 2021-01-01

## 2021-01-01 RX ORDER — OXYCODONE HYDROCHLORIDE 5 MG/1
5 TABLET ORAL
Status: DISCONTINUED | OUTPATIENT
Start: 2021-01-01 | End: 2021-01-01 | Stop reason: HOSPADM

## 2021-01-01 RX ORDER — KETOROLAC TROMETHAMINE 30 MG/ML
30 INJECTION, SOLUTION INTRAMUSCULAR; INTRAVENOUS EVERY 6 HOURS PRN
Status: DISCONTINUED | OUTPATIENT
Start: 2021-01-01 | End: 2021-01-01 | Stop reason: HOSPADM

## 2021-01-01 RX ORDER — CALCIUM CHLORIDE 100 MG/ML
INJECTION INTRAVENOUS; INTRAVENTRICULAR
Status: DISCONTINUED
Start: 2021-01-01 | End: 2021-01-01 | Stop reason: HOSPADM

## 2021-01-01 RX ORDER — ONDANSETRON 2 MG/ML
4 INJECTION INTRAMUSCULAR; INTRAVENOUS EVERY 4 HOURS PRN
Status: DISCONTINUED | OUTPATIENT
Start: 2021-01-01 | End: 2021-01-01 | Stop reason: HOSPADM

## 2021-01-01 RX ORDER — ONDANSETRON 2 MG/ML
4 INJECTION INTRAMUSCULAR; INTRAVENOUS
Status: COMPLETED | OUTPATIENT
Start: 2021-01-01 | End: 2021-01-01

## 2021-01-01 RX ORDER — ACETAMINOPHEN 325 MG/1
650 TABLET ORAL ONCE
Status: COMPLETED | OUTPATIENT
Start: 2021-01-01 | End: 2021-01-01

## 2021-01-01 RX ORDER — IBUPROFEN 600 MG/1
600 TABLET ORAL ONCE
Status: COMPLETED | OUTPATIENT
Start: 2021-01-01 | End: 2021-01-01

## 2021-01-01 RX ORDER — SODIUM CHLORIDE 9 MG/ML
500 INJECTION, SOLUTION INTRAVENOUS ONCE
Status: COMPLETED | OUTPATIENT
Start: 2021-01-01 | End: 2021-01-01

## 2021-01-01 RX ORDER — ONDANSETRON 4 MG/1
4 TABLET, ORALLY DISINTEGRATING ORAL EVERY 4 HOURS PRN
Status: DISCONTINUED | OUTPATIENT
Start: 2021-01-01 | End: 2021-01-01 | Stop reason: HOSPADM

## 2021-01-01 RX ORDER — SULFAMETHOXAZOLE AND TRIMETHOPRIM 800; 160 MG/1; MG/1
1 TABLET ORAL DAILY
COMMUNITY

## 2021-01-01 RX ORDER — AMOXICILLIN 250 MG
2 CAPSULE ORAL 2 TIMES DAILY
Status: DISCONTINUED | OUTPATIENT
Start: 2021-01-01 | End: 2021-01-01 | Stop reason: HOSPADM

## 2021-01-01 RX ORDER — PROMETHAZINE HYDROCHLORIDE 25 MG/1
12.5-25 SUPPOSITORY RECTAL EVERY 4 HOURS PRN
Status: DISCONTINUED | OUTPATIENT
Start: 2021-01-01 | End: 2021-01-01 | Stop reason: HOSPADM

## 2021-01-01 RX ORDER — AZITHROMYCIN 250 MG/1
500 TABLET, FILM COATED ORAL DAILY
Status: DISCONTINUED | OUTPATIENT
Start: 2021-01-01 | End: 2021-01-01 | Stop reason: HOSPADM

## 2021-01-01 RX ORDER — PHENYLEPHRINE HCL IN 0.9% NACL 0.5 MG/5ML
SYRINGE (ML) INTRAVENOUS
Status: DISCONTINUED
Start: 2021-01-01 | End: 2021-01-01 | Stop reason: HOSPADM

## 2021-01-01 RX ORDER — ETHAMBUTOL HYDROCHLORIDE 400 MG/1
1200 TABLET, FILM COATED ORAL DAILY
Status: DISCONTINUED | OUTPATIENT
Start: 2021-01-01 | End: 2021-01-01

## 2021-01-01 RX ORDER — MIDODRINE HYDROCHLORIDE 5 MG/1
10 TABLET ORAL
Status: DISCONTINUED | OUTPATIENT
Start: 2021-01-01 | End: 2021-01-01

## 2021-01-01 RX ORDER — LABETALOL HYDROCHLORIDE 5 MG/ML
10 INJECTION, SOLUTION INTRAVENOUS EVERY 4 HOURS PRN
Status: DISCONTINUED | OUTPATIENT
Start: 2021-01-01 | End: 2021-01-01 | Stop reason: HOSPADM

## 2021-01-01 RX ORDER — HEPARIN SODIUM 5000 [USP'U]/ML
5000 INJECTION, SOLUTION INTRAVENOUS; SUBCUTANEOUS EVERY 8 HOURS
Status: DISCONTINUED | OUTPATIENT
Start: 2021-01-01 | End: 2021-01-01 | Stop reason: HOSPADM

## 2021-01-01 RX ORDER — KETOROLAC TROMETHAMINE 30 MG/ML
30 INJECTION, SOLUTION INTRAMUSCULAR; INTRAVENOUS EVERY 6 HOURS PRN
Status: DISCONTINUED | OUTPATIENT
Start: 2021-01-01 | End: 2021-01-01

## 2021-01-01 RX ORDER — FUROSEMIDE 10 MG/ML
20 INJECTION INTRAMUSCULAR; INTRAVENOUS
Status: DISCONTINUED | OUTPATIENT
Start: 2021-01-01 | End: 2021-01-01 | Stop reason: HOSPADM

## 2021-01-01 RX ORDER — METFORMIN HYDROCHLORIDE 750 MG/1
750 TABLET, EXTENDED RELEASE ORAL
Qty: 30 TABLET | Refills: 0 | Status: SHIPPED | OUTPATIENT
Start: 2021-01-01 | End: 2021-06-09

## 2021-01-01 RX ORDER — DEXTROSE MONOHYDRATE 25 G/50ML
50 INJECTION, SOLUTION INTRAVENOUS
Status: DISCONTINUED | OUTPATIENT
Start: 2021-01-01 | End: 2021-01-01

## 2021-01-01 RX ORDER — NOREPINEPHRINE BITARTRATE 0.03 MG/ML
0-30 INJECTION, SOLUTION INTRAVENOUS CONTINUOUS
Status: DISCONTINUED | OUTPATIENT
Start: 2021-01-01 | End: 2021-01-01 | Stop reason: HOSPADM

## 2021-01-01 RX ORDER — ACETAMINOPHEN 500 MG
500 TABLET ORAL EVERY 6 HOURS PRN
Status: DISCONTINUED | OUTPATIENT
Start: 2021-01-01 | End: 2021-01-01 | Stop reason: HOSPADM

## 2021-01-01 RX ORDER — MIDODRINE HYDROCHLORIDE 5 MG/1
5 TABLET ORAL
Status: DISCONTINUED | OUTPATIENT
Start: 2021-01-01 | End: 2021-01-01

## 2021-01-01 RX ORDER — SODIUM CHLORIDE, SODIUM LACTATE, POTASSIUM CHLORIDE, AND CALCIUM CHLORIDE .6; .31; .03; .02 G/100ML; G/100ML; G/100ML; G/100ML
INJECTION, SOLUTION INTRAVENOUS
Status: COMPLETED | OUTPATIENT
Start: 2021-01-01 | End: 2021-01-01

## 2021-01-01 RX ORDER — INSULIN LISPRO 100 [IU]/ML
2-9 INJECTION, SOLUTION INTRAVENOUS; SUBCUTANEOUS EVERY 6 HOURS
Status: DISCONTINUED | OUTPATIENT
Start: 2021-01-01 | End: 2021-01-01

## 2021-01-01 RX ORDER — SODIUM CHLORIDE 9 MG/ML
INJECTION, SOLUTION INTRAVENOUS CONTINUOUS
Status: DISCONTINUED | OUTPATIENT
Start: 2021-01-01 | End: 2021-01-01

## 2021-01-01 RX ORDER — ATORVASTATIN CALCIUM 20 MG/1
20 TABLET, FILM COATED ORAL EVERY EVENING
COMMUNITY

## 2021-01-01 RX ORDER — METFORMIN HYDROCHLORIDE 750 MG/1
750 TABLET, EXTENDED RELEASE ORAL
Status: DISCONTINUED | OUTPATIENT
Start: 2021-01-01 | End: 2021-01-01 | Stop reason: HOSPADM

## 2021-01-01 RX ORDER — DEXTROSE MONOHYDRATE 25 G/50ML
INJECTION, SOLUTION INTRAVENOUS
Status: COMPLETED | OUTPATIENT
Start: 2021-01-01 | End: 2021-01-01

## 2021-01-01 RX ORDER — OXYCODONE HYDROCHLORIDE 5 MG/1
2.5 TABLET ORAL
Status: DISCONTINUED | OUTPATIENT
Start: 2021-01-01 | End: 2021-01-01 | Stop reason: HOSPADM

## 2021-01-01 RX ORDER — DEXMEDETOMIDINE HYDROCHLORIDE 4 UG/ML
.1-1.5 INJECTION, SOLUTION INTRAVENOUS CONTINUOUS
Status: DISCONTINUED | OUTPATIENT
Start: 2021-01-01 | End: 2021-01-01 | Stop reason: HOSPADM

## 2021-01-01 RX ORDER — BICTEGRAVIR SODIUM, EMTRICITABINE, AND TENOFOVIR ALAFENAMIDE FUMARATE 50; 200; 25 MG/1; MG/1; MG/1
1 TABLET ORAL DAILY
COMMUNITY

## 2021-01-01 RX ADMIN — AZITHROMYCIN MONOHYDRATE 500 MG: 250 TABLET ORAL at 04:55

## 2021-01-01 RX ADMIN — CEFTRIAXONE SODIUM 2 G: 2 INJECTION, POWDER, FOR SOLUTION INTRAMUSCULAR; INTRAVENOUS at 02:08

## 2021-01-01 RX ADMIN — INSULIN LISPRO 2 UNITS: 100 INJECTION, SOLUTION INTRAVENOUS; SUBCUTANEOUS at 07:49

## 2021-01-01 RX ADMIN — SODIUM CHLORIDE 1000 ML: 9 INJECTION, SOLUTION INTRAVENOUS at 08:07

## 2021-01-01 RX ADMIN — INSULIN LISPRO 2 UNITS: 100 INJECTION, SOLUTION INTRAVENOUS; SUBCUTANEOUS at 21:22

## 2021-01-01 RX ADMIN — INSULIN LISPRO 3 UNITS: 100 INJECTION, SOLUTION INTRAVENOUS; SUBCUTANEOUS at 17:12

## 2021-01-01 RX ADMIN — ATORVASTATIN CALCIUM 40 MG: 40 TABLET, FILM COATED ORAL at 06:03

## 2021-01-01 RX ADMIN — BICTEGRAVIR SODIUM, EMTRICITABINE, AND TENOFOVIR ALAFENAMIDE FUMARATE 1 TABLET: 50; 200; 25 TABLET ORAL at 06:25

## 2021-01-01 RX ADMIN — CEFTRIAXONE SODIUM 2 G: 2 INJECTION, POWDER, FOR SOLUTION INTRAMUSCULAR; INTRAVENOUS at 01:13

## 2021-01-01 RX ADMIN — CEFTRIAXONE SODIUM 2 G: 2 INJECTION, POWDER, FOR SOLUTION INTRAMUSCULAR; INTRAVENOUS at 02:26

## 2021-01-01 RX ADMIN — VANCOMYCIN HYDROCHLORIDE 1000 MG: 500 INJECTION, POWDER, LYOPHILIZED, FOR SOLUTION INTRAVENOUS at 03:11

## 2021-01-01 RX ADMIN — DOCUSATE SODIUM 50 MG AND SENNOSIDES 8.6 MG 2 TABLET: 8.6; 5 TABLET, FILM COATED ORAL at 05:58

## 2021-01-01 RX ADMIN — HEPARIN SODIUM 5000 UNITS: 5000 INJECTION, SOLUTION INTRAVENOUS; SUBCUTANEOUS at 12:41

## 2021-01-01 RX ADMIN — HEPARIN SODIUM 5000 UNITS: 5000 INJECTION, SOLUTION INTRAVENOUS; SUBCUTANEOUS at 22:26

## 2021-01-01 RX ADMIN — ATORVASTATIN CALCIUM 40 MG: 40 TABLET, FILM COATED ORAL at 06:23

## 2021-01-01 RX ADMIN — KETOROLAC TROMETHAMINE 30 MG: 30 INJECTION, SOLUTION INTRAMUSCULAR at 14:53

## 2021-01-01 RX ADMIN — ACYCLOVIR SODIUM 762 MG: 500 INJECTION, SOLUTION INTRAVENOUS at 02:03

## 2021-01-01 RX ADMIN — DOCUSATE SODIUM 50 MG AND SENNOSIDES 8.6 MG 2 TABLET: 8.6; 5 TABLET, FILM COATED ORAL at 05:02

## 2021-01-01 RX ADMIN — OXYCODONE 5 MG: 5 TABLET ORAL at 07:38

## 2021-01-01 RX ADMIN — EPINEPHRINE 0.5 MG: 0.1 INJECTION, SOLUTION ENDOTRACHEAL; INTRACARDIAC; INTRAVENOUS at 15:04

## 2021-01-01 RX ADMIN — KETOROLAC TROMETHAMINE 30 MG: 30 INJECTION, SOLUTION INTRAMUSCULAR at 23:08

## 2021-01-01 RX ADMIN — VANCOMYCIN HYDROCHLORIDE 1750 MG: 500 INJECTION, POWDER, LYOPHILIZED, FOR SOLUTION INTRAVENOUS at 02:39

## 2021-01-01 RX ADMIN — SODIUM CHLORIDE, POTASSIUM CHLORIDE, SODIUM LACTATE AND CALCIUM CHLORIDE 1000 ML: 600; 310; 30; 20 INJECTION, SOLUTION INTRAVENOUS at 18:51

## 2021-01-01 RX ADMIN — DIBASIC SODIUM PHOSPHATE, MONOBASIC POTASSIUM PHOSPHATE AND MONOBASIC SODIUM PHOSPHATE 500 MG: 852; 155; 130 TABLET ORAL at 06:03

## 2021-01-01 RX ADMIN — DEXTROSE MONOHYDRATE 50 ML: 25 INJECTION, SOLUTION INTRAVENOUS at 15:01

## 2021-01-01 RX ADMIN — CEFTRIAXONE SODIUM 2 G: 2 INJECTION, POWDER, FOR SOLUTION INTRAMUSCULAR; INTRAVENOUS at 20:19

## 2021-01-01 RX ADMIN — SULFAMETHOXAZOLE AND TRIMETHOPRIM 1 TABLET: 800; 160 TABLET ORAL at 06:03

## 2021-01-01 RX ADMIN — INSULIN LISPRO 6 UNITS: 100 INJECTION, SOLUTION INTRAVENOUS; SUBCUTANEOUS at 06:36

## 2021-01-01 RX ADMIN — FUROSEMIDE 20 MG: 10 INJECTION, SOLUTION INTRAMUSCULAR; INTRAVENOUS at 09:39

## 2021-01-01 RX ADMIN — HEPARIN SODIUM 5000 UNITS: 5000 INJECTION, SOLUTION INTRAVENOUS; SUBCUTANEOUS at 05:02

## 2021-01-01 RX ADMIN — SODIUM CHLORIDE: 9 INJECTION, SOLUTION INTRAVENOUS at 02:26

## 2021-01-01 RX ADMIN — INSULIN GLARGINE 15 UNITS: 100 INJECTION, SOLUTION SUBCUTANEOUS at 18:09

## 2021-01-01 RX ADMIN — ACETAMINOPHEN 650 MG: 325 TABLET, FILM COATED ORAL at 07:49

## 2021-01-01 RX ADMIN — EPINEPHRINE 1 MG: 0.1 INJECTION, SOLUTION ENDOTRACHEAL; INTRACARDIAC; INTRAVENOUS at 15:13

## 2021-01-01 RX ADMIN — IBUPROFEN 600 MG: 600 TABLET, FILM COATED ORAL at 19:46

## 2021-01-01 RX ADMIN — AZITHROMYCIN MONOHYDRATE 500 MG: 250 TABLET ORAL at 05:01

## 2021-01-01 RX ADMIN — INSULIN LISPRO 2 UNITS: 100 INJECTION, SOLUTION INTRAVENOUS; SUBCUTANEOUS at 18:08

## 2021-01-01 RX ADMIN — ACETAMINOPHEN 500 MG: 500 TABLET ORAL at 05:46

## 2021-01-01 RX ADMIN — SULFAMETHOXAZOLE AND TRIMETHOPRIM 1 TABLET: 800; 160 TABLET ORAL at 18:09

## 2021-01-01 RX ADMIN — SODIUM CHLORIDE 1000 ML: 9 INJECTION, SOLUTION INTRAVENOUS at 17:00

## 2021-01-01 RX ADMIN — SULFAMETHOXAZOLE AND TRIMETHOPRIM 1 TABLET: 800; 160 TABLET ORAL at 06:22

## 2021-01-01 RX ADMIN — OXYCODONE 5 MG: 5 TABLET ORAL at 19:51

## 2021-01-01 RX ADMIN — INSULIN LISPRO 2 UNITS: 100 INJECTION, SOLUTION INTRAVENOUS; SUBCUTANEOUS at 11:57

## 2021-01-01 RX ADMIN — INSULIN LISPRO 2 UNITS: 100 INJECTION, SOLUTION INTRAVENOUS; SUBCUTANEOUS at 05:58

## 2021-01-01 RX ADMIN — NOREPINEPHRINE BITARTRATE 10 MCG/MIN: 1 INJECTION INTRAVENOUS at 14:50

## 2021-01-01 RX ADMIN — MIDODRINE HYDROCHLORIDE 5 MG: 5 TABLET ORAL at 04:12

## 2021-01-01 RX ADMIN — INSULIN LISPRO 2 UNITS: 100 INJECTION, SOLUTION INTRAVENOUS; SUBCUTANEOUS at 12:41

## 2021-01-01 RX ADMIN — HEPARIN SODIUM 5000 UNITS: 5000 INJECTION, SOLUTION INTRAVENOUS; SUBCUTANEOUS at 21:19

## 2021-01-01 RX ADMIN — DOCUSATE SODIUM 50 MG AND SENNOSIDES 8.6 MG 2 TABLET: 8.6; 5 TABLET, FILM COATED ORAL at 18:11

## 2021-01-01 RX ADMIN — CEFTRIAXONE SODIUM 2 G: 2 INJECTION, POWDER, FOR SOLUTION INTRAMUSCULAR; INTRAVENOUS at 04:56

## 2021-01-01 RX ADMIN — SODIUM CHLORIDE, POTASSIUM CHLORIDE, SODIUM LACTATE AND CALCIUM CHLORIDE 1000 ML: 600; 310; 30; 20 INJECTION, SOLUTION INTRAVENOUS at 23:44

## 2021-01-01 RX ADMIN — ONDANSETRON 4 MG: 2 INJECTION INTRAMUSCULAR; INTRAVENOUS at 16:52

## 2021-01-01 RX ADMIN — ETHAMBUTOL HYDROCHLORIDE 1200 MG: 400 TABLET ORAL at 18:11

## 2021-01-01 RX ADMIN — ACETAMINOPHEN 650 MG: 325 TABLET, FILM COATED ORAL at 19:46

## 2021-01-01 RX ADMIN — INSULIN LISPRO 6 UNITS: 100 INJECTION, SOLUTION INTRAVENOUS; SUBCUTANEOUS at 18:09

## 2021-01-01 RX ADMIN — ONDANSETRON 4 MG: 2 INJECTION INTRAMUSCULAR; INTRAVENOUS at 11:22

## 2021-01-01 RX ADMIN — ENOXAPARIN SODIUM 40 MG: 40 INJECTION SUBCUTANEOUS at 05:58

## 2021-01-01 RX ADMIN — ACYCLOVIR SODIUM 762 MG: 500 INJECTION, SOLUTION INTRAVENOUS at 12:30

## 2021-01-01 RX ADMIN — BICTEGRAVIR SODIUM, EMTRICITABINE, AND TENOFOVIR ALAFENAMIDE FUMARATE 1 TABLET: 50; 200; 25 TABLET ORAL at 05:58

## 2021-01-01 RX ADMIN — INSULIN LISPRO 2 UNITS: 100 INJECTION, SOLUTION INTRAVENOUS; SUBCUTANEOUS at 20:35

## 2021-01-01 RX ADMIN — POLYETHYLENE GLYCOL 3350 1 PACKET: 17 POWDER, FOR SOLUTION ORAL at 18:02

## 2021-01-01 RX ADMIN — HEPARIN SODIUM 5000 UNITS: 5000 INJECTION, SOLUTION INTRAVENOUS; SUBCUTANEOUS at 04:55

## 2021-01-01 RX ADMIN — SODIUM CHLORIDE: 9 INJECTION, SOLUTION INTRAVENOUS at 06:26

## 2021-01-01 RX ADMIN — CEFTRIAXONE SODIUM 2 G: 2 INJECTION, POWDER, FOR SOLUTION INTRAMUSCULAR; INTRAVENOUS at 05:02

## 2021-01-01 RX ADMIN — SODIUM CHLORIDE, POTASSIUM CHLORIDE, SODIUM LACTATE AND CALCIUM CHLORIDE 1000 ML: 600; 310; 30; 20 INJECTION, SOLUTION INTRAVENOUS at 20:45

## 2021-01-01 RX ADMIN — ACETAMINOPHEN 650 MG: 325 TABLET, FILM COATED ORAL at 20:34

## 2021-01-01 RX ADMIN — DOCUSATE SODIUM 50 MG AND SENNOSIDES 8.6 MG 2 TABLET: 8.6; 5 TABLET, FILM COATED ORAL at 06:23

## 2021-01-01 RX ADMIN — INSULIN GLARGINE 15 UNITS: 100 INJECTION, SOLUTION SUBCUTANEOUS at 20:34

## 2021-01-01 RX ADMIN — ENOXAPARIN SODIUM 40 MG: 40 INJECTION SUBCUTANEOUS at 06:23

## 2021-01-01 RX ADMIN — ATORVASTATIN CALCIUM 40 MG: 40 TABLET, FILM COATED ORAL at 05:58

## 2021-01-01 RX ADMIN — SODIUM CHLORIDE: 9 INJECTION, SOLUTION INTRAVENOUS at 14:32

## 2021-01-01 RX ADMIN — BICTEGRAVIR SODIUM, EMTRICITABINE, AND TENOFOVIR ALAFENAMIDE FUMARATE 1 TABLET: 50; 200; 25 TABLET ORAL at 06:03

## 2021-01-01 RX ADMIN — ONDANSETRON 4 MG: 2 INJECTION INTRAMUSCULAR; INTRAVENOUS at 13:44

## 2021-01-01 RX ADMIN — INSULIN LISPRO 3 UNITS: 100 INJECTION, SOLUTION INTRAVENOUS; SUBCUTANEOUS at 11:52

## 2021-01-01 RX ADMIN — SODIUM CHLORIDE: 9 INJECTION, SOLUTION INTRAVENOUS at 06:11

## 2021-01-01 RX ADMIN — INSULIN LISPRO 3 UNITS: 100 INJECTION, SOLUTION INTRAVENOUS; SUBCUTANEOUS at 12:30

## 2021-01-01 RX ADMIN — EPINEPHRINE 1 MG: 0.1 INJECTION, SOLUTION ENDOTRACHEAL; INTRACARDIAC; INTRAVENOUS at 15:09

## 2021-01-01 RX ADMIN — HEPARIN SODIUM 5000 UNITS: 5000 INJECTION, SOLUTION INTRAVENOUS; SUBCUTANEOUS at 05:58

## 2021-01-01 RX ADMIN — OXYCODONE 5 MG: 5 TABLET ORAL at 00:33

## 2021-01-01 RX ADMIN — SULFAMETHOXAZOLE AND TRIMETHOPRIM 1 TABLET: 800; 160 TABLET ORAL at 05:58

## 2021-01-01 RX ADMIN — DOCUSATE SODIUM 50 MG AND SENNOSIDES 8.6 MG 2 TABLET: 8.6; 5 TABLET, FILM COATED ORAL at 18:09

## 2021-01-01 RX ADMIN — SODIUM CHLORIDE: 9 INJECTION, SOLUTION INTRAVENOUS at 22:41

## 2021-01-01 RX ADMIN — INSULIN GLARGINE 15 UNITS: 100 INJECTION, SOLUTION SUBCUTANEOUS at 17:57

## 2021-01-01 RX ADMIN — OXYCODONE 2.5 MG: 5 TABLET ORAL at 08:07

## 2021-01-01 RX ADMIN — FENTANYL CITRATE 50 MCG: 50 INJECTION, SOLUTION INTRAMUSCULAR; INTRAVENOUS at 23:44

## 2021-01-01 RX ADMIN — SODIUM CHLORIDE 1000 ML: 9 INJECTION, SOLUTION INTRAVENOUS at 02:12

## 2021-01-01 RX ADMIN — ENOXAPARIN SODIUM 40 MG: 40 INJECTION SUBCUTANEOUS at 06:02

## 2021-01-01 RX ADMIN — ONDANSETRON 4 MG: 4 TABLET, ORALLY DISINTEGRATING ORAL at 12:27

## 2021-01-01 RX ADMIN — MAGNESIUM SULFATE 2 G: 2 INJECTION INTRAVENOUS at 10:23

## 2021-01-01 RX ADMIN — HEPARIN SODIUM 5000 UNITS: 5000 INJECTION, SOLUTION INTRAVENOUS; SUBCUTANEOUS at 13:21

## 2021-01-01 RX ADMIN — INSULIN LISPRO 5 UNITS: 100 INJECTION, SOLUTION INTRAVENOUS; SUBCUTANEOUS at 17:29

## 2021-01-01 RX ADMIN — OXYCODONE 5 MG: 5 TABLET ORAL at 11:48

## 2021-01-01 RX ADMIN — AZITHROMYCIN MONOHYDRATE 500 MG: 250 TABLET ORAL at 05:58

## 2021-01-01 RX ADMIN — METFORMIN HYDROCHLORIDE 750 MG: 750 TABLET ORAL at 18:09

## 2021-01-01 RX ADMIN — SODIUM CHLORIDE, POTASSIUM CHLORIDE, SODIUM LACTATE AND CALCIUM CHLORIDE 1 L: 600; 310; 30; 20 INJECTION, SOLUTION INTRAVENOUS at 15:37

## 2021-01-01 RX ADMIN — SODIUM CHLORIDE: 9 INJECTION, SOLUTION INTRAVENOUS at 03:34

## 2021-01-01 RX ADMIN — DOCUSATE SODIUM 50 MG AND SENNOSIDES 8.6 MG 2 TABLET: 8.6; 5 TABLET, FILM COATED ORAL at 06:03

## 2021-01-01 RX ADMIN — INSULIN LISPRO 3 UNITS: 100 INJECTION, SOLUTION INTRAVENOUS; SUBCUTANEOUS at 00:55

## 2021-01-01 RX ADMIN — INSULIN LISPRO 3 UNITS: 100 INJECTION, SOLUTION INTRAVENOUS; SUBCUTANEOUS at 21:16

## 2021-01-01 RX ADMIN — SULFAMETHOXAZOLE AND TRIMETHOPRIM 1 TABLET: 800; 160 TABLET ORAL at 18:08

## 2021-01-01 RX ADMIN — MIDODRINE HYDROCHLORIDE 5 MG: 5 TABLET ORAL at 17:12

## 2021-01-01 RX ADMIN — SODIUM CHLORIDE: 9 INJECTION, SOLUTION INTRAVENOUS at 17:25

## 2021-01-01 RX ADMIN — ETHAMBUTOL HYDROCHLORIDE 1200 MG: 400 TABLET ORAL at 20:15

## 2021-01-01 RX ADMIN — INSULIN LISPRO 2 UNITS: 100 INJECTION, SOLUTION INTRAVENOUS; SUBCUTANEOUS at 06:03

## 2021-01-01 RX ADMIN — SULFAMETHOXAZOLE AND TRIMETHOPRIM 1 TABLET: 800; 160 TABLET ORAL at 18:11

## 2021-01-01 RX ADMIN — HEPARIN SODIUM 5000 UNITS: 5000 INJECTION, SOLUTION INTRAVENOUS; SUBCUTANEOUS at 14:53

## 2021-01-01 RX ADMIN — MIDODRINE HYDROCHLORIDE 5 MG: 5 TABLET ORAL at 07:31

## 2021-01-01 RX ADMIN — EPINEPHRINE 1 MG: 0.1 INJECTION, SOLUTION ENDOTRACHEAL; INTRACARDIAC; INTRAVENOUS at 15:03

## 2021-01-01 RX ADMIN — MIDODRINE HYDROCHLORIDE 5 MG: 5 TABLET ORAL at 17:25

## 2021-01-01 RX ADMIN — PROCHLORPERAZINE EDISYLATE 5 MG: 5 INJECTION INTRAMUSCULAR; INTRAVENOUS at 14:39

## 2021-01-01 RX ADMIN — GADOTERIDOL 15 ML: 279.3 INJECTION, SOLUTION INTRAVENOUS at 12:21

## 2021-01-01 RX ADMIN — HYDROMORPHONE HYDROCHLORIDE 0.25 MG: 1 INJECTION, SOLUTION INTRAMUSCULAR; INTRAVENOUS; SUBCUTANEOUS at 14:39

## 2021-01-01 RX ADMIN — VANCOMYCIN HYDROCHLORIDE 1000 MG: 500 INJECTION, POWDER, LYOPHILIZED, FOR SOLUTION INTRAVENOUS at 14:32

## 2021-01-01 RX ADMIN — HEPARIN SODIUM 5000 UNITS: 5000 INJECTION, SOLUTION INTRAVENOUS; SUBCUTANEOUS at 21:12

## 2021-01-01 RX ADMIN — MIDODRINE HYDROCHLORIDE 5 MG: 5 TABLET ORAL at 07:38

## 2021-01-01 RX ADMIN — CALCIUM CHLORIDE 1 G: 100 INJECTION INTRAVENOUS; INTRAVENTRICULAR at 15:00

## 2021-01-01 RX ADMIN — Medication 400 MG: at 06:03

## 2021-01-01 RX ADMIN — KETOROLAC TROMETHAMINE 30 MG: 30 INJECTION, SOLUTION INTRAMUSCULAR at 05:48

## 2021-01-01 RX ADMIN — INSULIN LISPRO 3 UNITS: 100 INJECTION, SOLUTION INTRAVENOUS; SUBCUTANEOUS at 19:51

## 2021-01-01 RX ADMIN — SODIUM CHLORIDE 500 ML: 9 INJECTION, SOLUTION INTRAVENOUS at 07:34

## 2021-01-01 RX ADMIN — SODIUM BICARBONATE 50 MEQ: 84 INJECTION, SOLUTION INTRAVENOUS at 14:54

## 2021-01-01 RX ADMIN — KETOROLAC TROMETHAMINE 30 MG: 30 INJECTION, SOLUTION INTRAMUSCULAR at 12:27

## 2021-01-01 RX ADMIN — CEFTRIAXONE SODIUM 2 G: 2 INJECTION, POWDER, FOR SOLUTION INTRAMUSCULAR; INTRAVENOUS at 02:18

## 2021-01-01 RX ADMIN — EPINEPHRINE 1 MG: 0.1 INJECTION, SOLUTION ENDOTRACHEAL; INTRACARDIAC; INTRAVENOUS at 14:58

## 2021-01-01 RX ADMIN — MIDODRINE HYDROCHLORIDE 10 MG: 5 TABLET ORAL at 10:22

## 2021-01-01 RX ADMIN — MORPHINE SULFATE 4 MG: 4 INJECTION INTRAVENOUS at 03:27

## 2021-01-01 RX ADMIN — INSULIN LISPRO 8 UNITS: 100 INJECTION, SOLUTION INTRAVENOUS; SUBCUTANEOUS at 07:43

## 2021-01-01 RX ADMIN — INSULIN LISPRO 2 UNITS: 100 INJECTION, SOLUTION INTRAVENOUS; SUBCUTANEOUS at 07:28

## 2021-01-01 RX ADMIN — DOCUSATE SODIUM 50 MG AND SENNOSIDES 8.6 MG 2 TABLET: 8.6; 5 TABLET, FILM COATED ORAL at 18:12

## 2021-01-01 RX ADMIN — INSULIN LISPRO 5 UNITS: 100 INJECTION, SOLUTION INTRAVENOUS; SUBCUTANEOUS at 12:24

## 2021-01-01 RX ADMIN — ACETAMINOPHEN 500 MG: 500 TABLET ORAL at 22:26

## 2021-01-01 RX ADMIN — KETOROLAC TROMETHAMINE 30 MG: 30 INJECTION, SOLUTION INTRAMUSCULAR at 01:30

## 2021-01-01 ASSESSMENT — ENCOUNTER SYMPTOMS
DOUBLE VISION: 0
SHORTNESS OF BREATH: 0
VOMITING: 0
HEADACHES: 1
SHORTNESS OF BREATH: 0
FEVER: 1
DOUBLE VISION: 0
ORTHOPNEA: 0
VOMITING: 0
BLOOD IN STOOL: 0
COUGH: 0
DIZZINESS: 0
POLYDIPSIA: 0
SORE THROAT: 0
MYALGIAS: 0
SORE THROAT: 0
EYE PAIN: 0
DIARRHEA: 0
WHEEZING: 0
FOCAL WEAKNESS: 0
ABDOMINAL PAIN: 0
DIZZINESS: 1
BLOOD IN STOOL: 0
VOMITING: 0
CHILLS: 0
CHILLS: 0
DEPRESSION: 0
BACK PAIN: 0
SPEECH CHANGE: 0
LOSS OF CONSCIOUSNESS: 0
TREMORS: 0
FEVER: 0
BLURRED VISION: 0
MEMORY LOSS: 0
HEADACHES: 0
SHORTNESS OF BREATH: 0
PALPITATIONS: 0
HEADACHES: 0
BLURRED VISION: 0
CHILLS: 0
PALPITATIONS: 0
PALPITATIONS: 0
PND: 0
PALPITATIONS: 0
HEADACHES: 0
PHOTOPHOBIA: 0
WEIGHT LOSS: 0
SORE THROAT: 0
FOCAL WEAKNESS: 0
COUGH: 1
DIZZINESS: 0
SPUTUM PRODUCTION: 0
VOMITING: 0
CONSTIPATION: 0
HEADACHES: 0
ABDOMINAL PAIN: 0
FEVER: 0
COUGH: 0
ABDOMINAL PAIN: 0
DIARRHEA: 0
FEVER: 0
BLURRED VISION: 0
BLURRED VISION: 0
WEAKNESS: 0
CHILLS: 1
FLANK PAIN: 0
FEVER: 1
COUGH: 0
VOMITING: 0
NAUSEA: 0
FLANK PAIN: 0
NAUSEA: 0
FALLS: 0
SHORTNESS OF BREATH: 0
NAUSEA: 0
BLURRED VISION: 0
HALLUCINATIONS: 0
SHORTNESS OF BREATH: 1
SPUTUM PRODUCTION: 1
FEVER: 0
CHILLS: 1
BLOOD IN STOOL: 0
VOMITING: 0
NAUSEA: 1
COUGH: 0
SHORTNESS OF BREATH: 0
DEPRESSION: 1
FALLS: 0
SHORTNESS OF BREATH: 0
SORE THROAT: 0
BRUISES/BLEEDS EASILY: 0
TREMORS: 1
DEPRESSION: 0
DIZZINESS: 1
CHILLS: 1
FOCAL WEAKNESS: 0
FEVER: 0
COUGH: 0
DIARRHEA: 0
SEIZURES: 0
WEAKNESS: 1
WHEEZING: 0
ORTHOPNEA: 0
ABDOMINAL PAIN: 1
DIARRHEA: 0
HEADACHES: 0
FEVER: 1
SENSORY CHANGE: 0
SORE THROAT: 0
PALPITATIONS: 0
ABDOMINAL PAIN: 0
NERVOUS/ANXIOUS: 0
SHORTNESS OF BREATH: 1
HEMOPTYSIS: 0
NAUSEA: 0
VOMITING: 0
MYALGIAS: 0
CHILLS: 1
WEAKNESS: 0
CONSTIPATION: 0
HEMOPTYSIS: 0
CONSTIPATION: 1
PHOTOPHOBIA: 0
CHILLS: 0
ABDOMINAL PAIN: 0
WHEEZING: 0
SPEECH CHANGE: 0
DIARRHEA: 0
FOCAL WEAKNESS: 0
COUGH: 0
EYE PAIN: 0
CONSTIPATION: 0
DIARRHEA: 0
FOCAL WEAKNESS: 1
WEAKNESS: 0
FLANK PAIN: 0
SPUTUM PRODUCTION: 0
NERVOUS/ANXIOUS: 0
HEMOPTYSIS: 0
NAUSEA: 0
BACK PAIN: 0
SORE THROAT: 0
ABDOMINAL PAIN: 0
NAUSEA: 0

## 2021-01-01 ASSESSMENT — PAIN DESCRIPTION - PAIN TYPE
TYPE: ACUTE PAIN

## 2021-01-01 ASSESSMENT — PATIENT HEALTH QUESTIONNAIRE - PHQ9
4. FEELING TIRED OR HAVING LITTLE ENERGY: NOT AT ALL
7. TROUBLE CONCENTRATING ON THINGS, SUCH AS READING THE NEWSPAPER OR WATCHING TELEVISION: NOT AT ALL
SUM OF ALL RESPONSES TO PHQ9 QUESTIONS 1 AND 2: 0
2. FEELING DOWN, DEPRESSED, IRRITABLE, OR HOPELESS: NOT AT ALL
SUM OF ALL RESPONSES TO PHQ9 QUESTIONS 1 AND 2: 1
8. MOVING OR SPEAKING SO SLOWLY THAT OTHER PEOPLE COULD HAVE NOTICED. OR THE OPPOSITE, BEING SO FIGETY OR RESTLESS THAT YOU HAVE BEEN MOVING AROUND A LOT MORE THAN USUAL: NOT AT ALL
2. FEELING DOWN, DEPRESSED, IRRITABLE, OR HOPELESS: SEVERAL DAYS
3. TROUBLE FALLING OR STAYING ASLEEP OR SLEEPING TOO MUCH: NOT AT ALL
1. LITTLE INTEREST OR PLEASURE IN DOING THINGS: NOT AT ALL
1. LITTLE INTEREST OR PLEASURE IN DOING THINGS: NOT AT ALL
5. POOR APPETITE OR OVEREATING: NOT AT ALL
SUM OF ALL RESPONSES TO PHQ QUESTIONS 1-9: 1
2. FEELING DOWN, DEPRESSED, IRRITABLE, OR HOPELESS: NOT AT ALL
1. LITTLE INTEREST OR PLEASURE IN DOING THINGS: NOT AT ALL
6. FEELING BAD ABOUT YOURSELF - OR THAT YOU ARE A FAILURE OR HAVE LET YOURSELF OR YOUR FAMILY DOWN: NOT AL ALL
SUM OF ALL RESPONSES TO PHQ9 QUESTIONS 1 AND 2: 0
9. THOUGHTS THAT YOU WOULD BE BETTER OFF DEAD, OR OF HURTING YOURSELF: NOT AT ALL

## 2021-01-01 ASSESSMENT — LIFESTYLE VARIABLES
ALCOHOL_USE: NO
AVERAGE NUMBER OF DAYS PER WEEK YOU HAVE A DRINK CONTAINING ALCOHOL: 0
ALCOHOL_USE: NO
TOTAL SCORE: 0
TOTAL SCORE: 0
CONSUMPTION TOTAL: INCOMPLETE
HOW MANY TIMES IN THE PAST YEAR HAVE YOU HAD 5 OR MORE DRINKS IN A DAY: 0
EVER FELT BAD OR GUILTY ABOUT YOUR DRINKING: NO
CONSUMPTION TOTAL: NEGATIVE
EVER FELT BAD OR GUILTY ABOUT YOUR DRINKING: NO
DOES PATIENT WANT TO STOP DRINKING: NO
ON A TYPICAL DAY WHEN YOU DRINK ALCOHOL HOW MANY DRINKS DO YOU HAVE: 0
SUBSTANCE_ABUSE: 1
HAVE YOU EVER FELT YOU SHOULD CUT DOWN ON YOUR DRINKING: NO
HAVE PEOPLE ANNOYED YOU BY CRITICIZING YOUR DRINKING: NO
TOTAL SCORE: 0
HAVE YOU EVER FELT YOU SHOULD CUT DOWN ON YOUR DRINKING: NO
EVER HAD A DRINK FIRST THING IN THE MORNING TO STEADY YOUR NERVES TO GET RID OF A HANGOVER: NO
HAVE PEOPLE ANNOYED YOU BY CRITICIZING YOUR DRINKING: NO
EVER HAD A DRINK FIRST THING IN THE MORNING TO STEADY YOUR NERVES TO GET RID OF A HANGOVER: NO
SUBSTANCE_ABUSE: 1

## 2021-01-01 ASSESSMENT — COGNITIVE AND FUNCTIONAL STATUS - GENERAL
HELP NEEDED FOR BATHING: A LITTLE
TURNING FROM BACK TO SIDE WHILE IN FLAT BAD: A LITTLE
TOILETING: A LITTLE
WALKING IN HOSPITAL ROOM: A LITTLE
WALKING IN HOSPITAL ROOM: A LITTLE
MOBILITY SCORE: 19
SUGGESTED CMS G CODE MODIFIER MOBILITY: CK
STANDING UP FROM CHAIR USING ARMS: A LITTLE
CLIMB 3 TO 5 STEPS WITH RAILING: A LOT
DAILY ACTIVITIY SCORE: 18
SUGGESTED CMS G CODE MODIFIER DAILY ACTIVITY: CJ
HELP NEEDED FOR BATHING: A LITTLE
SUGGESTED CMS G CODE MODIFIER DAILY ACTIVITY: CK
CLIMB 3 TO 5 STEPS WITH RAILING: A LITTLE
STANDING UP FROM CHAIR USING ARMS: A LITTLE
MOVING FROM LYING ON BACK TO SITTING ON SIDE OF FLAT BED: A LITTLE
SUGGESTED CMS G CODE MODIFIER MOBILITY: CK
DAILY ACTIVITIY SCORE: 21
WALKING IN HOSPITAL ROOM: A LITTLE
DRESSING REGULAR UPPER BODY CLOTHING: A LITTLE
MOVING FROM LYING ON BACK TO SITTING ON SIDE OF FLAT BED: A LITTLE
DRESSING REGULAR LOWER BODY CLOTHING: A LITTLE
MOVING TO AND FROM BED TO CHAIR: A LITTLE
MOVING FROM LYING ON BACK TO SITTING ON SIDE OF FLAT BED: A LITTLE
MOBILITY SCORE: 18
EATING MEALS: A LITTLE
TOILETING: A LITTLE
TOILETING: A LITTLE
HELP NEEDED FOR BATHING: A LITTLE
MOVING TO AND FROM BED TO CHAIR: A LITTLE
STANDING UP FROM CHAIR USING ARMS: A LITTLE
CLIMB 3 TO 5 STEPS WITH RAILING: A LOT
DRESSING REGULAR LOWER BODY CLOTHING: A LITTLE
SUGGESTED CMS G CODE MODIFIER MOBILITY: CK
DAILY ACTIVITIY SCORE: 21
MOBILITY SCORE: 18
SUGGESTED CMS G CODE MODIFIER DAILY ACTIVITY: CJ
PERSONAL GROOMING: A LITTLE
DRESSING REGULAR LOWER BODY CLOTHING: A LITTLE

## 2021-01-01 ASSESSMENT — COPD QUESTIONNAIRES
DURING THE PAST 4 WEEKS HOW MUCH DID YOU FEEL SHORT OF BREATH: NONE/LITTLE OF THE TIME
DO YOU EVER COUGH UP ANY MUCUS OR PHLEGM?: NO/ONLY WITH OCCASIONAL COLDS OR INFECTIONS
HAVE YOU SMOKED AT LEAST 100 CIGARETTES IN YOUR ENTIRE LIFE: YES
COPD SCREENING SCORE: 4

## 2021-01-01 ASSESSMENT — FIBROSIS 4 INDEX
FIB4 SCORE: 1.78
FIB4 SCORE: 2.17
FIB4 SCORE: 1.46
FIB4 SCORE: 1.46
FIB4 SCORE: 1.72

## 2021-05-07 PROBLEM — E83.52 HYPERCALCEMIA: Status: ACTIVE | Noted: 2021-01-01

## 2021-05-07 PROBLEM — R73.9 HYPERGLYCEMIA: Status: ACTIVE | Noted: 2021-01-01

## 2021-05-07 NOTE — ASSESSMENT & PLAN NOTE
Noncompliant  Start glargine 15 units, continue insulin sliding scale  Hypoglycemia protocol  Glucose improving  5/9-add Metformin  mg daily.  Continue to monitor.  Continue the same Lantus 15 units at bedtime

## 2021-05-07 NOTE — ED NOTES
PIV line established. Blood drawn and sent to the lab. IVFs infusing, see MAR. Urinal left at bedside.

## 2021-05-07 NOTE — ED NOTES
Pt BP noted to be 93/57. ERP updated. Morphine dose canceled, fentanyl ordered instead with 1 additional liter of LR per ERP verbal order.

## 2021-05-07 NOTE — ASSESSMENT & PLAN NOTE
This is Sepsis Present on admission  SIRS criteria identified on my evaluation include: Fever, with temperature greater than 101 deg F, Tachycardia, with heart rate greater than 90 BPM and Tachypnea, with respirations greater than 20 per minute  Source is Unknown  Suspected onset of infection (date and time) Unknown  Sepsis protocol initiated  Fluid resuscitation ordered per protocol  IV antibiotics as appropriate for source of sepsis (ceftriaxone)   While organ dysfunction may be noted elsewhere in this problem list or in the chart, degree of organ dysfunction does not meet CMS criteria for severe sepsis    Crypto negative , HSV, HIV viral load pending  covid negative   X-ray unremarkable  blood cultures positive for strep . No clear source   Echocardiogram pending  MRI brain unremarkable  LP performed in ED unremarkable . Culture to follow     5/8- stop IVF .  Continue ceftriaxone,, MAC coverage and Bactrim per ID recs.  Consider MATHEW after echocardiogram if ID will recommend  5/9- no need per MATHEW.  Continue ceftriaxone for today.  Hopefully addition to oral antibiotic tomorrow.  Sepsis resolved

## 2021-05-07 NOTE — PROGRESS NOTES
Pt transferred to 801 on zoll monitor. Pt placed on monitor with SR 70. Pt placed in bed. Bed locked and in low position. Call light within reach. POC reviewed and questions answered.

## 2021-05-07 NOTE — H&P
"Hospital Medicine History & Physical Note    Date of Service  5/7/2021    Primary Care Physician  Miladys Sanchez P.A.-C.    Consultants  Neurology: Dr. Chanel overnight  Consult ID in AM    Code Status  Full Code    Chief Complaint  Chief Complaint   Patient presents with   • Coronavirus Screening     fever, nausea, body aches       History of Presenting Illness  51M noncompliant HIV patient, noncompliant diabetic presenting with fever and tremor in left hand. When asked why he stopped, he said \"I ran out of fucks to give.\" Patient denies other symptoms except for chronic back pain and is requesting pain medication on an ongoing basis. Patient states his LP site is tender. LP performed in ED relatively unremarkable, no wbc, protein 47 with mildly low glucose. CT head w/o cst wnl. Neuro consulted rec MRI brain w/wo cst, cd4 count, hiv viral load, ID consult in AM, and f/u neuro if mri brain reveals structural abnormalities. Otherwise patient denies sob, cough, chest pain/pressure, congestion, extremity swelling, diaphoresis, dizziness, nausea, vomiting, hemoptysis, diarrhea, constipation, headaches, dysuria/dyspareunia, polyuria, or polydipsia. Denies history of Malignancy, drug, alcohol, or cannabinoid use.    Review of Systems  ROS  10+ systems reviewed and negative except as noted in HPI.    Past Medical History   has a past medical history of AIDS (acquired immune deficiency syndrome) (HCC), Brain tumor (HCC), CAD (coronary artery disease), Cancer (HCC), Depression, Diabetes mellitus, HIV (human immunodeficiency virus infection) (HCC), HIV (human immunodeficiency virus infection) (HCC), Lymphoma (HCC), and Myocardial infarct (HCC).    Surgical History   has no past surgical history on file.     Family History  No pertinent family history elicited.    Social History   reports that he has been smoking cigarettes. He has been smoking about 0.50 packs per day. He has never used smokeless tobacco. He reports that he " does not drink alcohol and does not use drugs.    Allergies  Allergies   Allergen Reactions   • Peanut-Derived Anaphylaxis   • Hydrocodone-Acetaminophen Vomiting   • Penicillins Hives     Patient tolerated [facility] 08/13/15       Medications  Prior to Admission Medications   Prescriptions Last Dose Informant Patient Reported? Taking?   Alcohol Swabs  Patient No No   Sig: Wipe site with prep pad prior to injection.   Patient not taking: Reported on 11/26/2020   Blood Glucose Meter Kit  Patient No No   Sig: Test blood sugar as recommended by provider. True Metrix blood glucose monitoring kit.   Patient not taking: Reported on 11/26/2020   Blood Glucose Test Strips  Patient No No   Sig: Use one True Metrix strip to test blood sugar twice daily before meals.   Patient not taking: Reported on 11/26/2020   Insulin Pen Needle 32 G x 4 mm  Patient No No   Sig: Use one pen needle in pen device to inject insulin once daily.   Patient not taking: Reported on 11/26/2020   Lancets  Patient No No   Sig: Use one True Metrix lancet to test blood sugar twice daily before meals.   Patient not taking: Reported on 11/26/2020   insulin glargine (INSULIN GLARGINE) 100 UNIT/ML Solution Pen-injector injection Not Taking at Unknown time Patient No No   Sig: Inject 30 Units as instructed every evening.   Patient not taking: Reported on 5/7/2021      Facility-Administered Medications: None       Physical Exam  Temp:  [36.8 °C (98.2 °F)-38.5 °C (101.3 °F)] 37.9 °C (100.3 °F)  Pulse:  [] 98  Resp:  [10-20] 13  BP: ()/(55-76) 127/74  SpO2:  [94 %-97 %] 95 %    Physical Exam  Physical Exam  Vitals and nursing note reviewed.  Constitutional:     General: Alert in no acute distress.    Appearance: Pt is not ill-appearing.     Comments:  HENT: No signs of trauma, Bilateral external ears normal, Nose normal.      Head: Normocephalic.     Mouth/Throat: Unremarkable. Moist Mucosa     Comments:   Eyes:      Pupils: Pupils are equal round and  reactive to light, Conjunctiva normal, Non-icteric.   Neck: Normal range of motion, No tenderness, Supple, No stridor.   Cardiovascular:      Rate and Rhythm: Regular Rate and Rhythm     Heart sounds: No murmur, rubs, or gallops appreciated.  Pulmonary/Thorax & Lungs:      Effort: No respiratory distress.     Breath sounds: No stridor. No wheezing, No Rhonchi, no palpable chest tenderness     Comments:    Abdomen:     General: There is no distension.      Palpation/Auscultation: Soft, No tenderness, No masses, No pulsatile masses. Bowel sounds normal. No rebound/peritoneal signs. Negative Wilkins sign.     Hernia: No hernia is appreciated at this time.   Skin: Warm, Dry, No erythema, No rash.       Coloration: Skin is not jaundiced or pale.   Recent LP site without erythema, bandage in place.  Back: No bony tenderness, No CVA tenderness.   Musculoskeletal:         General: No swelling or tenderness. DROM spine in all fields.  Extremities:       General: Intact distal pulses, No edema, No tenderness, No cyanosis      Vascular:   Neurologic/Psych: Alert, No focal deficits noted.       Comments: Left handed tremor      Laboratory:  Recent Labs     05/06/21 1852 05/07/21  0335   WBC 9.6 5.6   RBC 4.57* 4.48*   HEMOGLOBIN 12.9* 12.5*   HEMATOCRIT 37.0* 36.7*   MCV 81.0* 81.9   MCH 28.2 27.9   MCHC 34.9 34.1   RDW 38.9 40.3   PLATELETCT 177 144*   MPV 10.2 10.3     Recent Labs     05/06/21  1852 05/07/21  0335   SODIUM 126* 130*   POTASSIUM 4.4 4.0   CHLORIDE 97 101   CO2 23 25   GLUCOSE 347* 281*   BUN 19 18   CREATININE 0.77 0.74   CALCIUM 8.7 7.5*     Recent Labs     05/06/21  1852 05/07/21  0335   ALTSGPT 37 31   ASTSGOT 26 26   ALKPHOSPHAT 292* 230*   TBILIRUBIN 0.4 0.5   GLUCOSE 347* 281*         No results for input(s): NTPROBNP in the last 72 hours.  Recent Labs     05/07/21  0335   TRIGLYCERIDE 104   HDL 21*   LDL 62     No results for input(s): TROPONINT in the last 72 hours.    Imaging:  DX-CHEST-PORTABLE (1  VIEW)   Final Result      No acute cardiopulmonary abnormality.      CT-HEAD W/O   Final Result      No evidence of acute intracranial process.      MR-BRAIN-WITH & W/O    (Results Pending)   EC-ECHOCARDIOGRAM COMPLETE W/O CONT    (Results Pending)         Assessment/Plan:  I anticipate this patient will require at least two midnights for appropriate medical management, necessitating inpatient admission.    Fever- (present on admission)  Assessment & Plan  Neuro Chanel consulted, recs appreciated: -MRI Brain W/WO CST. CD4 counts, HIV viral load. Will need an ID consult. Followup with neurology consultation in the morning if MRI Brain W/WO CST is revealing for a structural abnormality.  Known HBV infection  HSV & cryptococcal  Acyclovir and rocephin  Septic workup  Tylenol/toradol    Sepsis (HCC)- (present on admission)  Assessment & Plan  This is Sepsis Present on admission  SIRS criteria identified on my evaluation include: Fever, with temperature greater than 101 deg F, Tachycardia, with heart rate greater than 90 BPM and Tachypnea, with respirations greater than 20 per minute  Source is Unknown  Suspected onset of infection (date and time) Unknown  Sepsis protocol initiated  Fluid resuscitation ordered per protocol  IV antibiotics as appropriate for source of sepsis  While organ dysfunction may be noted elsewhere in this problem list or in the chart, degree of organ dysfunction does not meet CMS criteria for severe sepsis    Crypto, HSV, HIV viral load, blood cultures x 2 f/u,   Rocephin/acyclovir  LP performed in ED, f/u results  reConsult Neuro in AM  Consult ID prior to initiating antiretrovirals.  Judicious IVF          HIV (human immunodeficiency virus infection) (HCC)- (present on admission)  Assessment & Plan  cd4 and viral count ordered  Consult ID, held antiretroviral therapy due to noncompliance and possible infection pending ID clearance.          Hyperglycemia- (present on admission)  Assessment &  Plan  Resume insulin, noncompliant patent   bhb negative.  Glucose trending down.    Hyponatremia- (present on admission)  Assessment & Plan  Improving 126 to 130 this admission  Trend  Supplement as appropriate.  Present on previous admissions.    HBV (hepatitis B virus) infection- (present on admission)  Assessment & Plan  chronic    Pancytopenia (HCC)- (present on admission)  Assessment & Plan  2/2 HIV  trend    Elevated alkaline phosphatase level- (present on admission)  Assessment & Plan  chronic    Chronic pain- (present on admission)  Assessment & Plan  Back pain  Currently exacerbated by LP performed per patient  Ketorolac   Fentanyl prn    Methamphetamine abuse (HCC)- (present on admission)  Assessment & Plan  Drug tox    DM (diabetes mellitus) (HCC)- (present on admission)  Assessment & Plan  Noncompliant  Basal and ISS insulin resumed  Glucose improving

## 2021-05-07 NOTE — ASSESSMENT & PLAN NOTE
cd4 and viral count profile complete  Dr. Velazquez following   Continue bactrim per prophylaxis

## 2021-05-07 NOTE — CARE PLAN
Problem: Communication  Goal: The ability to communicate needs accurately and effectively will improve  Outcome: PROGRESSING AS EXPECTED     Problem: Safety  Goal: Will remain free from injury  Outcome: PROGRESSING AS EXPECTED  Goal: Will remain free from falls  Outcome: PROGRESSING AS EXPECTED     Problem: Infection  Goal: Will remain free from infection  Outcome: PROGRESSING AS EXPECTED     Problem: Venous Thromboembolism (VTW)/Deep Vein Thrombosis (DVT) Prevention:  Goal: Patient will participate in Venous Thrombosis (VTE)/Deep Vein Thrombosis (DVT)Prevention Measures  Outcome: PROGRESSING AS EXPECTED     Problem: Bowel/Gastric:  Goal: Normal bowel function is maintained or improved  Outcome: PROGRESSING AS EXPECTED  Goal: Will not experience complications related to bowel motility  Outcome: PROGRESSING AS EXPECTED     Problem: Knowledge Deficit  Goal: Knowledge of disease process/condition, treatment plan, diagnostic tests, and medications will improve  Outcome: PROGRESSING AS EXPECTED  Goal: Knowledge of the prescribed therapeutic regimen will improve  Outcome: PROGRESSING AS EXPECTED     Problem: Discharge Barriers/Planning  Goal: Patient's continuum of care needs will be met  Outcome: PROGRESSING AS EXPECTED     Problem: Pain Management  Goal: Pain level will decrease to patient's comfort goal  Outcome: PROGRESSING AS EXPECTED     Problem: Respiratory:  Goal: Respiratory status will improve  Outcome: PROGRESSING AS EXPECTED     Problem: Fluid Volume:  Goal: Will maintain balanced intake and output  Outcome: PROGRESSING AS EXPECTED

## 2021-05-07 NOTE — CONSULTS
"      Infectious Disease Consultation  Note Author: Tony Pena M.D.       Name Vincent Riojas     1970   Age/Sex 51 y.o. male   MRN [id number]           Chief Complaint:   Fever, chills    HPI:  Vincent Riojas is a 51 year old gentleman with a past medical history significant for HIV infection(previously with AIDS based on CD4 count <200) with intermittent noncompliance, remote IVDU(states 6-8 months ago), and diabetes mellitus who presented 2021 with complaints of a 2-day history of fever, chills, headache, and left arm tremor. He states his mother  of COVID recently and for at least the past 2 months he has been depressed and non-compliant with his medications, including his Biktarvy, TMP-SMX prophylaxis, and diabetes medications. He felt well up until 2 days ago when after work he began to notice high fevers, up to 103 degrees fahrenheit, and shaking chills. He has had some recent weight loss of about 10 pounds. He states he has tremors in his left arm which are new and also endorses weakness, though he denies sensory or motor changes elsewhere. He has a widely positive review of systems including complaints of nausea, mild abdominal pain, dysuria, productive cough, and shortness of breath. He denies photophobia, new rash, diarrhea, vomiting, hematochezia, melena, or visual changes. He does complain of new pain in his left posterior neck which is new over the past few days and feels that he has a \"lump\" there. He follows with the [facility] clinic but has not been seen there since 2020 at which point he had a CD4 count of 158 and a HIV viral load of 150.     He was seen in the emergency department yesterday where he underwent lumbar puncture due to concern for fever and headache in an HIV patient. He had a slightly high protein but normal WBC with negative cryptococcal antigen. An MRI was done and did not show evidence for toxoplasmosis, CNS lymphoma, or PML. He has been started on ceftriaxone " and acyclovir.     Review of Systems   Constitutional: Positive for chills, fever and malaise/fatigue.   HENT: Positive for hearing loss (chronic). Negative for sore throat.    Eyes: Negative for blurred vision, double vision and photophobia.   Respiratory: Positive for cough, sputum production and shortness of breath.    Cardiovascular: Negative for chest pain and leg swelling.   Gastrointestinal: Positive for abdominal pain, constipation and nausea. Negative for blood in stool, diarrhea, melena and vomiting.   Genitourinary: Positive for dysuria. Negative for flank pain, frequency, hematuria and urgency.   Musculoskeletal: Negative for myalgias.   Skin: Negative for itching and rash.   Neurological: Positive for tremors, focal weakness and headaches. Negative for sensory change and speech change.   Psychiatric/Behavioral: Positive for depression. Negative for suicidal ideas.             Past Medical History (Chronic medical problem, known complications and current treatment)    HIV - diagnosed 11/2010, reports to me he does not know how he was infected. Sexual partners include males and females in the past, had a long-term partner at the time and a long history of IV drug use.   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 No pertinent surgical history.    Current Outpatient Medications:  Home Medications     Reviewed by Omari Richard (Pharmacy Tech) on 05/07/21 at 4129  Med List Status: Complete   Medication Last Dose Status   Alcohol Swabs  Active   Blood Glucose Meter Kit  Active   Blood Glucose Test Strips  Active   insulin glargine (INSULIN GLARGINE) 100 UNIT/ML Solution Pen-injector injection Not Taking Active   Insulin Pen Needle 32 G x 4 mm  Active   Lancets  Active                Medication  Allergy/Sensitivities:  Allergies   Allergen Reactions   • Peanut-Derived Anaphylaxis   • Hydrocodone-Acetaminophen Vomiting   • Penicillins Hives     Patient tolerated Ancef 08/13/15         Family History (mandatory)   No pertinent family history    Social History (mandatory)   Social History     Socioeconomic History   • Marital status: Single     Spouse name: Not on file   • Number of children: Not on file   • Years of education: Not on file   • Highest education level: Not on file   Occupational History   • Not on file   Tobacco Use   • Smoking status: Current Every Day Smoker     Packs/day: 0.50     Types: Cigarettes     Last attempt to quit: 2015     Years since quittin.7   • Smokeless tobacco: Never Used   Substance and Sexual Activity   • Alcohol use: No     Comment: socially   • Drug use: No     Types: Intravenous     Comment: hx of. currently clean   • Sexual activity: Not on file   Other Topics Concern   • Not on file   Social History Narrative   • Not on file     Social Determinants of Health     Financial Resource Strain:    • Difficulty of Paying Living Expenses:    Food Insecurity:    • Worried About Running Out of Food in the Last Year:    • Ran Out of Food in the Last Year:    Transportation Needs:    • Lack of Transportation (Medical):    • Lack of Transportation (Non-Medical):    Physical Activity:    • Days of Exercise per Week:    • Minutes of Exercise per Session:    Stress:    • Feeling of Stress :    Social Connections:    • Frequency of Communication with Friends and Family:    • Frequency of Social Gatherings with Friends and Family:    • Attends [employer] Services:    • Active Member of Clubs or Organizations:    • Attends Club or Organization Meetings:    • Marital Status:    Intimate Partner Violence:    • Fear of Current or Ex-Partner:    • Emotionally Abused:    • Physically Abused:    • Sexually Abused:      Bisexual male, currently no sexual partners though previous male  and female partners, working at [employer] as an   Previous IVDU with meth, denies currently states 6-8 months previously was using  Denies any alcohol use  Admits to intermittent marijuana use  PCP : Miladys Sanchez P.A.-C.    Physical Exam     Vitals:    05/07/21 0600 05/07/21 0722 05/07/21 0852 05/07/21 1238   BP: 107/67 (!) 81/50 (!) 92/61    Pulse: (!) 101      Resp: 19      Temp: 36.7 °C (98 °F) 37.1 °C (98.7 °F)  37.4 °C (99.3 °F)   TempSrc: Temporal Oral     SpO2: 96% 91%     Weight: 76 kg (167 lb 8.8 oz)      Height:         Body mass index is 22.11 kg/m².  O2 therapy: Pulse Oximetry: 91 %, O2 (LPM): 0, O2 Delivery Device: None - Room Air    Physical Exam   Constitutional: He is oriented to person, place, and time.   Patient has active rigors, otherwise no acute distress   HENT:   Head: Normocephalic and atraumatic.   Moist mucous membranes, no oral lesions including no oral candida.    Eyes: Pupils are equal, round, and reactive to light. EOM are normal.   Cardiovascular: Normal rate and regular rhythm.   No murmur heard.  Pulmonary/Chest: Effort normal and breath sounds normal. He has no wheezes. He has no rales. He exhibits no tenderness.   Abdominal: Soft. He exhibits no distension and no mass. There is abdominal tenderness (+periumbilical tenderness, marked. no rebound, no peritoneal signs, normoactive bowel sounds). There is no rebound.   Musculoskeletal:         General: No edema.      Cervical back: Neck supple.   Lymphadenopathy:     He has cervical adenopathy (minimal, single tender normal-sized lymph node in left posterior cervical chain).   Neurological: He is alert and oriented to person, place, and time. No cranial nerve deficit.   3-4/5 strength in LUE, patient has diffuse rigors but tremors do seem significantly worse in the LUE  5/5 strength in all other extremities  CNII-XII intact  Cerebellar exam normal  Sensation intact and equal bilaterally   Skin: No rash noted.         Data Review          Lab Data Review:  Recent Results (from the past 24 hour(s))   EKG (NOW)    Collection Time: 21  6:26 PM   Result Value Ref Range    Report       [facility]    Test Date:  2021  Pt Name:    VANESSA ARCHIBALD                 Department: ER  MRN:        [id number]                      Room:       [facility]  Gender:     Male                         Technician: [id number]  :        1970                   Requested By:TRUDY KING  Order #:    [id number]                    Reading MD:    Measurements  Intervals                                Axis  Rate:       105                          P:          67  NV:         132                          QRS:        69  QRSD:       80                           T:          74  QT:         312  QTc:        413    Interpretive Statements  SINUS TACHYCARDIA  BORDERLINE T ABNORMALITIES, ANT-LAT LEADS  No previous ECG available for comparison     POCT glucose device results    Collection Time: 21  6:27 PM   Result Value Ref Range    Glucose - Accu-Ck 336 (H) 65 - 99 mg/dL   CBC WITH DIFFERENTIAL    Collection Time: 21  6:52 PM   Result Value Ref Range    WBC 9.6 4.8 - 10.8 K/uL    RBC 4.57 (L) 4.70 - 6.10 M/uL    Hemoglobin 12.9 (L) 14.0 - 18.0 g/dL    Hematocrit 37.0 (L) 42.0 - 52.0 %    MCV 81.0 (L) 81.4 - 97.8 fL    MCH 28.2 27.0 - 33.0 pg    MCHC 34.9 33.7 - 35.3 g/dL    RDW 38.9 35.9 - 50.0 fL    Platelet Count 177 164 - 446 K/uL    MPV 10.2 9.0 - 12.9 fL    Neutrophils-Polys 81.90 (H) 44.00 - 72.00 %    Lymphocytes 10.90 (L) 22.00 - 41.00 %    Monocytes 6.40 0.00 - 13.40 %    Eosinophils 0.20 0.00 - 6.90 %    Basophils 0.20 0.00 - 1.80 %    Immature Granulocytes 0.40 0.00 - 0.90 %    Nucleated RBC 0.00 /100 WBC    Neutrophils (Absolute) 7.82 (H) 1.82 - 7.42 K/uL    Lymphs (Absolute) 1.04 1.00 - 4.80 K/uL    Monos (Absolute) 0.61 0.00 - 0.85 K/uL    Eos (Absolute) 0.02 0.00 - 0.51 K/uL    Baso (Absolute) 0.02 0.00 - 0.12  K/uL    Immature Granulocytes (abs) 0.04 0.00 - 0.11 K/uL    NRBC (Absolute) 0.00 K/uL   COMP METABOLIC PANEL    Collection Time: 05/06/21  6:52 PM   Result Value Ref Range    Sodium 126 (L) 135 - 145 mmol/L    Potassium 4.4 3.6 - 5.5 mmol/L    Chloride 97 96 - 112 mmol/L    Co2 23 20 - 33 mmol/L    Anion Gap 6.0 (L) 7.0 - 16.0    Glucose 347 (H) 65 - 99 mg/dL    Bun 19 8 - 22 mg/dL    Creatinine 0.77 0.50 - 1.40 mg/dL    Calcium 8.7 8.5 - 10.5 mg/dL    AST(SGOT) 26 12 - 45 U/L    ALT(SGPT) 37 2 - 50 U/L    Alkaline Phosphatase 292 (H) 30 - 99 U/L    Total Bilirubin 0.4 0.1 - 1.5 mg/dL    Albumin 3.1 (L) 3.2 - 4.9 g/dL    Total Protein 6.5 6.0 - 8.2 g/dL    Globulin 3.4 1.9 - 3.5 g/dL    A-G Ratio 0.9 g/dL   D-Dimer    Collection Time: 05/06/21  6:52 PM   Result Value Ref Range    D-Dimer Screen 0.44 0.00 - 0.50 ug/mL (FEU)   CRP Quantitative (Non-Cardiac)    Collection Time: 05/06/21  6:52 PM   Result Value Ref Range    Stat C-Reactive Protein 2.82 (H) 0.00 - 0.75 mg/dL   Procalcitonin    Collection Time: 05/06/21  6:52 PM   Result Value Ref Range    Procalcitonin 0.22 <0.25 ng/mL   LACTIC ACID    Collection Time: 05/06/21  6:52 PM   Result Value Ref Range    Lactic Acid 1.7 0.5 - 2.0 mmol/L   BETA-HYDROXYBUTYRIC ACID    Collection Time: 05/06/21  6:52 PM   Result Value Ref Range    beta-Hydroxybutyric Acid 0.06 0.02 - 0.27 mmol/L   VENOUS BLOOD GAS    Collection Time: 05/06/21  6:52 PM   Result Value Ref Range    Venous Bg Ph 7.44 7.31 - 7.45    Venous Bg Pco2 35.3 (L) 41.0 - 51.0 mmHg    Venous Bg Po2 42.4 (H) 25.0 - 40.0 mmHg    Venous Bg O2 Saturation 82.2 %    Venous Bg Hco3 24 24 - 28 mmol/L    Venous Bg Base Excess 0 mmol/L   ESTIMATED GFR    Collection Time: 05/06/21  6:52 PM   Result Value Ref Range    GFR If African American >60 >60 mL/min/1.73 m 2    GFR If Non African American >60 >60 mL/min/1.73 m 2   Blood Culture,Hold    Collection Time: 05/06/21  6:52 PM   Result Value Ref Range    Blood Culture  Hold Collected    COV-2, FLU A/B, AND RSV BY PCR (2-4 HOURS CEPHEID): Collect NP swab in VTM    Collection Time: 05/06/21  7:06 PM    Specimen: Respirate   Result Value Ref Range    Influenza virus A RNA Negative Negative    Influenza virus B, PCR Negative Negative    RSV, PCR Negative Negative    SARS-CoV-2 by PCR NotDetected     SARS-CoV-2 Source NP Swab    CSF CULTURE    Collection Time: 05/06/21 10:48 PM    Specimen: Tap; CSF   Result Value Ref Range    Significant Indicator NEG     Source CSF     Site TAP     Culture Result -     Gram Stain Result Rare WBCs.  No organisms seen.      CSF PROTEIN    Collection Time: 05/06/21 10:48 PM   Result Value Ref Range    Total Protein, CSF 47 (H) 15 - 45 mg/dL   CSF GLUCOSE    Collection Time: 05/06/21 10:48 PM   Result Value Ref Range    Glucose  (H) 40 - 80 mg/dL   CSF CELL COUNT    Collection Time: 05/06/21 10:48 PM   Result Value Ref Range    Number Of Tubes 4     Volume 6.0 mL    Color-Body Fluid Colorless     Character-Body Fluid Clear     Supernatant Appearance Colorless     Total RBC Count 3 cells/uL    Crenated RBC 0 %    Total WBC Count 3 0 - 10 cells/uL    Lymphs 80 %    Mononuclear Cells - CSF 20 %    CSF Tube Number 3    GRAM STAIN    Collection Time: 05/06/21 10:48 PM    Specimen: CSF   Result Value Ref Range    Significant Indicator .     Source CSF     Site TAP     Gram Stain Result Rare WBCs.  No organisms seen.      Cryptococcal Antigen, CSF    Collection Time: 05/06/21 10:48 PM    Specimen: CSF   Result Value Ref Range    Cryptococcal Antigen, CSF Negative Negative   CBC without Differential    Collection Time: 05/07/21  3:35 AM   Result Value Ref Range    WBC 5.6 4.8 - 10.8 K/uL    RBC 4.48 (L) 4.70 - 6.10 M/uL    Hemoglobin 12.5 (L) 14.0 - 18.0 g/dL    Hematocrit 36.7 (L) 42.0 - 52.0 %    MCV 81.9 81.4 - 97.8 fL    MCH 27.9 27.0 - 33.0 pg    MCHC 34.1 33.7 - 35.3 g/dL    RDW 40.3 35.9 - 50.0 fL    Platelet Count 144 (L) 164 - 446 K/uL    MPV  10.3 9.0 - 12.9 fL   Comp Metabolic Panel (CMP)    Collection Time: 05/07/21  3:35 AM   Result Value Ref Range    Sodium 130 (L) 135 - 145 mmol/L    Potassium 4.0 3.6 - 5.5 mmol/L    Chloride 101 96 - 112 mmol/L    Co2 25 20 - 33 mmol/L    Anion Gap 4.0 (L) 7.0 - 16.0    Glucose 281 (H) 65 - 99 mg/dL    Bun 18 8 - 22 mg/dL    Creatinine 0.74 0.50 - 1.40 mg/dL    Calcium 7.5 (L) 8.5 - 10.5 mg/dL    AST(SGOT) 26 12 - 45 U/L    ALT(SGPT) 31 2 - 50 U/L    Alkaline Phosphatase 230 (H) 30 - 99 U/L    Total Bilirubin 0.5 0.1 - 1.5 mg/dL    Albumin 2.4 (L) 3.2 - 4.9 g/dL    Total Protein 5.2 (L) 6.0 - 8.2 g/dL    Globulin 2.8 1.9 - 3.5 g/dL    A-G Ratio 0.9 g/dL   Lipid Profile (Lipid Panel) Fasting    Collection Time: 05/07/21  3:35 AM   Result Value Ref Range    Cholesterol,Tot 104 100 - 199 mg/dL    Triglycerides 104 0 - 149 mg/dL    HDL 21 (A) >=40 mg/dL    LDL 62 <100 mg/dL   ESTIMATED GFR    Collection Time: 05/07/21  3:35 AM   Result Value Ref Range    GFR If African American >60 >60 mL/min/1.73 m 2    GFR If Non African American >60 >60 mL/min/1.73 m 2   POCT glucose device results    Collection Time: 05/07/21  5:50 AM   Result Value Ref Range    Glucose - Accu-Ck 300 (H) 65 - 99 mg/dL   OSMOLALITY SERUM    Collection Time: 05/07/21  6:58 AM   Result Value Ref Range    Osmolality Serum 288 278 - 298 mOsm/kg H2O   PROTHROMBIN TIME (INR)    Collection Time: 05/07/21  6:58 AM   Result Value Ref Range    PT 14.4 12.0 - 14.6 sec    INR 1.09 0.87 - 1.13   APTT    Collection Time: 05/07/21  6:58 AM   Result Value Ref Range    APTT 37.3 (H) 24.7 - 36.0 sec   POCT glucose device results    Collection Time: 05/07/21  7:41 AM   Result Value Ref Range    Glucose - Accu-Ck 352 (H) 65 - 99 mg/dL   POCT glucose device results    Collection Time: 05/07/21 12:27 PM   Result Value Ref Range    Glucose - Accu-Ck 241 (H) 65 - 99 mg/dL       Imaging/Procedures Review:      MR-BRAIN-WITH & W/O   Final Result      1.  Mild supratentorial  white matter disease. Nonspecific findings consistent with microvascular ischemic change versus demyelination or gliosis.   2.  Concerning the history of HIV, no imaging evidence of CNS lymphoma, HIV encephalopathy, toxoplasmosis, PML, or other opportunistic infection.      DX-CHEST-PORTABLE (1 VIEW)   Final Result      No acute cardiopulmonary abnormality.      CT-HEAD W/O   Final Result      No evidence of acute intracranial process.      EC-ECHOCARDIOGRAM COMPLETE W/O CONT    (Results Pending)               Assessment/Plan     #Possible disseminated MAC infection  #Sepsis  #HIV  #Type 2 Diabetes Mellitus  #HBV  -Patient with HIV noncompliant with medication presents with fever, chills, nausea, and headache with some abdominal tenderness and recent weight loss. His last CD4 count was 159 a year ago and he has been noncompliant with medication for at least several months, indicating his CD4 count may be less than 50. Differential at this time includes mycobacterial infection versus bacterial sepsis. No evidence for CMV, PJP, or CNS infections given MRI results and lack of hypoxia. Cryptococcal antigen of the CSF was negative.  -Await blood culture results, order AFB culture of the blood  -Await CD4 count and viral load  -Do not start antiretrovirals for now  -Start Azithromycin 500mg PO daily and ethambutol 1200mg PO daily empirically for  disseminated MAC  -Agree with choice of ceftriaxone to cover possible bacterial sepsis pending culture results, discontinue acyclovir as he does not have any signs of encephalitis  -Start 1 tablet of Bactrim DS for PJP prophylaxis for CD4 count probably under 200

## 2021-05-07 NOTE — ED NOTES
Pt educated on importance of fever to have minimal blankets, pt refusing to give up blankets. Pt also continues to take off monitor leads and BP cuff.

## 2021-05-07 NOTE — PROGRESS NOTES
Patient was seen at bedside and chart was reviewed. Please see Dr. Lubin's note for further details.  1-year-old male past medical history of HIV, noncompliance, diabetes, drug abuse presenting complaining of fever, nausea, body aches.  Refer to H&P of Dr. Lubin for further details.  On arrival patient tachycardic, febrile, WBC 9.6 (higher from his baseline).  LP was performed by ED physician.  Glucose CSF elevated, total protein CSF elevated otherwise unremarkable.  CT of the head no evidence of acute intracranial process.  Patient denies cough, abdominal pain, diarrhea.  He reports trouble with voiding.  He was started on ceftriaxone and acyclovir.  Pt asking only for breakfast. He has some pain at where LP was done.   ID Dr. Velazquez consulted.  Brain MRI/echocardiogram pending.  Per Dr. Velazquez patient not been seen by [facility] for over a year.   Continue ceftriaxone, acyclovir  Fluid Resuscitation  Sepsis protocol   Appreciate recs from Dr. Marcus Adame M.D.

## 2021-05-07 NOTE — PROGRESS NOTES
Called by Dr. Shell    50 y/o male who is HIV positive and noncompliant with antiretroviral therapy comes to the E.D. w/ a fever. Currently no known source of infection. LP was done and was relatively unremarkable (no whites, protein at 47) although glucose was a little low (160/347 ~ 48%). CT Head W/O CST was normal. Neurology was consulted because of a tremor in his left hand that has been on and off inconsistently over the last two days. Given his metabolic abnormalities and his likely underlying infectious process I don't know what to make of it, but doubt that there's a primary neurologic explanation for all these symptoms. It is important to establish if this gentleman has progressed into AIDS due to his medication non-adherence. An MRI Brain W/WO CST would be helpful to evaluate for PML (no large hypodensities noted on CT).     Plan:   -MRI Brain W/WO CST.   -CD4 counts, HIV viral load.  -Will need an ID consult.   -Followup with neurology consultation in the morning if MRI Brain W/WO CST is revealing for a structural abnormality.

## 2021-05-07 NOTE — ED PROVIDER NOTES
ED Provider Note    Scribed for Eduardo Shell M.D. by Lauren Gamez. 2021, 6:01 PM.    Primary care provider: Miladys Sanchez P.A.-C.  Means of arrival: EMS  History obtained from: patient   History limited by: none    CHIEF COMPLAINT  Chief Complaint   Patient presents with   • Coronavirus Screening     fever, nausea, body aches       HPI  Vincent Riojas is a 51 y.o. male who presents to the Emergency Department fevers. His highest measured fever was 104 °F. Patient has associated symptoms of cough, intermittent body aches, intermittent headaches, and nausea. Denies changes in smell or taste. Additionally the patient states he developed a new tremor today. The tremor is located in his left hand and he states he is unable to control it. Denies history of seizures or known COVID exposure. Endorses use of marijuana, but denies any other drugs or alcohol. Patient received his first dose of the Moderna COVID vaccine 3 weeks ago. He has a past medical history of diabetes and HIV and hasn't been checking blood sugar or taking any of his medications for 6 months.      REVIEW OF SYSTEMS  Pertinent positives include fevers, cough, intermittent body aches, intermittent headaches, and nausea. Pertinent negatives include changes in smell or taste. All other systems negative.    PAST MEDICAL HISTORY   has a past medical history of AIDS (acquired immune deficiency syndrome) (HCC), Brain tumor (HCC), CAD (coronary artery disease), Cancer (HCC), Depression, Diabetes mellitus, HIV (human immunodeficiency virus infection) (HCC), HIV (human immunodeficiency virus infection) (HCC), Lymphoma (HCC), and Myocardial infarct (HCC).    SURGICAL HISTORY  patient denies any surgical history    SOCIAL HISTORY  Social History     Tobacco Use   • Smoking status: Current Every Day Smoker     Packs/day: 0.50     Types: Cigarettes     Last attempt to quit: 2015     Years since quittin.7   • Smokeless tobacco: Never Used    Substance Use Topics   • Alcohol use: No     Comment: socially   • Drug use: No     Types: Intravenous     Comment: hx of. currently clean      Social History     Substance and Sexual Activity   Drug Use No   • Types: Intravenous    Comment: hx of. currently clean       FAMILY HISTORY  None pertinent    CURRENT MEDICATIONS  Home Medications    **Home medications have not yet been reviewed for this encounter**         ALLERGIES  Allergies   Allergen Reactions   • Peanut-Derived Anaphylaxis   • Hydrocodone-Acetaminophen Vomiting   • Penicillins Hives     Patient tolerated Ancef 08/13/15       PHYSICAL EXAM  VITAL SIGNS: /62   Pulse (!) 108   Temp 36.8 °C (98.2 °F) (Temporal)   Resp 16   Wt 76.2 kg (168 lb)   SpO2 96%   BMI 22.16 kg/m²     Constitutional: Well developed, Well nourished, mild distress.   HENT: Normocephalic, Atraumatic, mask in place, Dry mucous membranes, no thrush or ulcerations.  Eyes: Conjunctiva normal, No discharge.   Neck: Supple, No stridor   Cardiovascular: Tachycardic, Normal rhythm, No murmurs, equal pulses.   Pulmonary: Normal breath sounds, No respiratory distress, No wheezing, No rales, No rhonchi.  Chest: No chest wall tenderness or deformity.   Abdomen:Soft, No tenderness, No masses, no rebound, no guarding.   Back: No CVA tenderness.   Musculoskeletal: No major deformities noted, Left calf tenderness, calf's are symmetric, no cords.  Patient does have a tremor to his left hand.  He does not seem to be able to control this.  It continues even when he does finger-nose movements and is distracted.  Skin: Warm, Dry, No erythema, No rash.   Neurologic: Alert & oriented x 3, Normal motor function,  No focal deficits noted.   Psychiatric: Affect normal, Judgment normal, Mood normal.     LABS  Results for orders placed or performed during the hospital encounter of 05/06/21   COV-2, FLU A/B, AND RSV BY PCR (2-4 HOURS CEPHEID): Collect NP swab in VTM    Specimen: Respirate   Result  Value Ref Range    Influenza virus A RNA Negative Negative    Influenza virus B, PCR Negative Negative    RSV, PCR Negative Negative    SARS-CoV-2 by PCR NotDetected     SARS-CoV-2 Source NP Swab    CBC WITH DIFFERENTIAL   Result Value Ref Range    WBC 9.6 4.8 - 10.8 K/uL    RBC 4.57 (L) 4.70 - 6.10 M/uL    Hemoglobin 12.9 (L) 14.0 - 18.0 g/dL    Hematocrit 37.0 (L) 42.0 - 52.0 %    MCV 81.0 (L) 81.4 - 97.8 fL    MCH 28.2 27.0 - 33.0 pg    MCHC 34.9 33.7 - 35.3 g/dL    RDW 38.9 35.9 - 50.0 fL    Platelet Count 177 164 - 446 K/uL    MPV 10.2 9.0 - 12.9 fL    Neutrophils-Polys 81.90 (H) 44.00 - 72.00 %    Lymphocytes 10.90 (L) 22.00 - 41.00 %    Monocytes 6.40 0.00 - 13.40 %    Eosinophils 0.20 0.00 - 6.90 %    Basophils 0.20 0.00 - 1.80 %    Immature Granulocytes 0.40 0.00 - 0.90 %    Nucleated RBC 0.00 /100 WBC    Neutrophils (Absolute) 7.82 (H) 1.82 - 7.42 K/uL    Lymphs (Absolute) 1.04 1.00 - 4.80 K/uL    Monos (Absolute) 0.61 0.00 - 0.85 K/uL    Eos (Absolute) 0.02 0.00 - 0.51 K/uL    Baso (Absolute) 0.02 0.00 - 0.12 K/uL    Immature Granulocytes (abs) 0.04 0.00 - 0.11 K/uL    NRBC (Absolute) 0.00 K/uL   COMP METABOLIC PANEL   Result Value Ref Range    Sodium 126 (L) 135 - 145 mmol/L    Potassium 4.4 3.6 - 5.5 mmol/L    Chloride 97 96 - 112 mmol/L    Co2 23 20 - 33 mmol/L    Anion Gap 6.0 (L) 7.0 - 16.0    Glucose 347 (H) 65 - 99 mg/dL    Bun 19 8 - 22 mg/dL    Creatinine 0.77 0.50 - 1.40 mg/dL    Calcium 8.7 8.5 - 10.5 mg/dL    AST(SGOT) 26 12 - 45 U/L    ALT(SGPT) 37 2 - 50 U/L    Alkaline Phosphatase 292 (H) 30 - 99 U/L    Total Bilirubin 0.4 0.1 - 1.5 mg/dL    Albumin 3.1 (L) 3.2 - 4.9 g/dL    Total Protein 6.5 6.0 - 8.2 g/dL    Globulin 3.4 1.9 - 3.5 g/dL    A-G Ratio 0.9 g/dL   D-Dimer   Result Value Ref Range    D-Dimer Screen 0.44 0.00 - 0.50 ug/mL (FEU)   CRP Quantitative (Non-Cardiac)   Result Value Ref Range    Stat C-Reactive Protein 2.82 (H) 0.00 - 0.75 mg/dL   Procalcitonin   Result Value Ref Range     Procalcitonin 0.22 <0.25 ng/mL   LACTIC ACID   Result Value Ref Range    Lactic Acid 1.7 0.5 - 2.0 mmol/L   BETA-HYDROXYBUTYRIC ACID   Result Value Ref Range    beta-Hydroxybutyric Acid 0.06 0.02 - 0.27 mmol/L   VENOUS BLOOD GAS   Result Value Ref Range    Venous Bg Ph 7.44 7.31 - 7.45    Venous Bg Pco2 35.3 (L) 41.0 - 51.0 mmHg    Venous Bg Po2 42.4 (H) 25.0 - 40.0 mmHg    Venous Bg O2 Saturation 82.2 %    Venous Bg Hco3 24 24 - 28 mmol/L    Venous Bg Base Excess 0 mmol/L   ESTIMATED GFR   Result Value Ref Range    GFR If African American >60 >60 mL/min/1.73 m 2    GFR If Non African American >60 >60 mL/min/1.73 m 2   Blood Culture,Hold   Result Value Ref Range    Blood Culture Hold Collected    CSF CULTURE    Specimen: Tap; CSF   Result Value Ref Range    Significant Indicator NEG     Source CSF     Site TAP     Culture Result -     Gram Stain Result Rare WBCs.  No organisms seen.      CSF PROTEIN   Result Value Ref Range    Total Protein, CSF 47 (H) 15 - 45 mg/dL   CSF GLUCOSE   Result Value Ref Range    Glucose  (H) 40 - 80 mg/dL   CSF CELL COUNT   Result Value Ref Range    Number Of Tubes 4     Volume 6.0 mL    Color-Body Fluid Colorless     Character-Body Fluid Clear     Supernatant Appearance Colorless     Total RBC Count 3 cells/uL    Crenated RBC 0 %    Total WBC Count 3 0 - 10 cells/uL    Lymphs 80 %    Mononuclear Cells - CSF 20 %    CSF Tube Number 3    GRAM STAIN    Specimen: CSF   Result Value Ref Range    Significant Indicator .     Source CSF     Site TAP     Gram Stain Result Rare WBCs.  No organisms seen.      EKG (NOW)   Result Value Ref Range    Report       [facility] Emergency Dept.    Test Date:  2021  Pt Name:    VANESSA ARCHIBALD                 Department: ER  MRN:        [id number]                      Room:       [facility]  Gender:     Male                         Technician: [id number]  :        1970                   Requested By:TRUDY Christy  #:    [id number]                    Reading MD:    Measurements  Intervals                                Axis  Rate:       105                          P:          67  OR:         132                          QRS:        69  QRSD:       80                           T:          74  QT:         312  QTc:        413    Interpretive Statements  SINUS TACHYCARDIA  BORDERLINE T ABNORMALITIES, ANT-LAT LEADS  No previous ECG available for comparison     POCT glucose device results   Result Value Ref Range    Glucose - Accu-Ck 336 (H) 65 - 99 mg/dL     All labs reviewed by me.    EKG  12 Lead EKG interpreted by me as above    RADIOLOGY  DX-CHEST-PORTABLE (1 VIEW)   Final Result      No acute cardiopulmonary abnormality.      CT-HEAD W/O   Final Result      No evidence of acute intracranial process.        The radiologist's interpretation of all radiological studies have been reviewed by me.    Lumbar Puncture Procedure Note    Indication: to obtain spinal fluid for diagnostic testing    Consent: The patient was counseled regarding the procedure, it's indications, risks, potential complications and alternatives and any questions were answered. Consent was obtained.    Procedure: The patient was placed in the sitting, supported by bed side stand, position and the appropriate landmarks were identified. The area was prepped and draped in the usual sterile fashion. Anesthesia was obtained using 2 cc of 1% Lidocaine without epinephrine. A spinal needle was inserted at the L4- L5 level with the stylet in place until spinal fluid was returned. Opening pressure was not measured. At this point 5.0 cc of serosangiouness fluid was obtained and sent for appropriate testing. The stylet was then replaced and the needle was withdrawn. A sterile dressing was placed over the site and the patient was placed in the supine position.    The patient tolerated the procedure well.    Complications: None    COURSE & MEDICAL DECISION MAKING  Pertinent  Labs & Imaging studies reviewed. (See chart for details)    Reviewed patient's old medical records which showed her has a history HIV and diabetes. Further review shows she was seen in November 2020 and diagnosed with pancreatitis.    6:01 PM - Patient seen and examined at bedside.  I discussed that we will obtain labs and imaging to further evaluate for a cause of his symptoms. The patient will receive IV fluids for Tachycardia. Ordered CT head, chest x-ray, VBG, beta-hydroxybutyric acid, D-dimer, lactic acid, UA, CMP, CBC w/ diff, CRP quant, procalcitonin, COVID/SARS and EKG to evaluate his symptoms. The differential diagnoses include but are not limited to: COVID, sepsis, tremor seizure, intercranial process, DKA    7:45 PM - Nursing alerted the patient has a temperature of 101 °F. He will be treated with tylenol 650 mg and ibuprofen 600 mg.    8:19 PM - Patient was reevaluated at bedside. Patient is sleeping upon entering the room. Discussed lab and radiology results with the patient and informed them that his head CT and chest x-ray were normal and there is no obvious infection at this time. Additionally the patients COVID test is negative. He will be treated with an additional liter of fluids for hyperglycemia.    8:34 PM - I informed the patient that at this time I do not have a cause for his symptoms, however with his history of HIV and his new symptoms of a hand tremor and fever there is concern for infection in the brain. Discussed the need for a lumbar puncture. Patient verbalizes understanding and agreement to this plan of care.      10:34 PM - Lumbar puncture procedure preformed at this time (see procedure note above for details). Ordered CSF studies.    11:42 PM - Patient will be treated with Fentanyl 50 mg and a 3rd liter of IV fluids    12:26 AM - Reviewed patients CSF studies. Paged neurology.    12:39 AM - I discussed the patient's case and the above findings with Dr. Valdez (neurology) who states  the tremor may or may not be concerning. Recommended T4 count, viral load, and CT brain w/ and w/o, as well as covering the patient with Rocephin. Bright hospitalist. Patient will be treated with Rocephin.    1:09 AM - I discussed the patient's case and the above findings with Dr. Lubin (Hospitalist) who agreed to evaluate patient for hospitalization. Patients care will be transferred at this time.     There was a good response to IV fluids after patient reevaluation.    Medical Decision Making: Patient presents with initial fever and body aches.  With some mild shortness of breath.  I was initially concerned he may have COVID-19.  COVID-19 testing is negative.  Chest x-ray does not show any pneumonia.  Patient has been noncompliant with his antiretrovirals despite this he has a abnormal blood cell count.  Patient had a new neurologic deficit with a new tremor to the left hand I am concerned about possible viral encephalopathy therefore when no other source for his fevers could be found lumbar puncture was performed.  He only has 3 white blood cells but this protein is elevated which may be more indicative of a viral infection.  He will be empirically started on acyclovir.  He was given Rocephin.  We will admit the patient for further work-up with MRIs with and without as discussed with neurology.    DISPOSITION:  Patient will be hospitalized by Dr. Lubin in guarded condition.      FINAL IMPRESSION  1. Fever, unspecified fever cause    2. Tremor of left hand    3. HIV infection, unspecified symptom status (HCC)          Lauren ARTHUR (Arturo), am scribing for, and in the presence of, Eduardo Shell M.D.    Electronically signed by: Lauren Gamez (Arturo), 5/6/2021    Eduardo ARTHUR M.D. personally performed the services described in this documentation, as scribed by Lauren Gamez in my presence, and it is both accurate and complete. C    The note accurately reflects work and decisions made by me.   Eduardo Shell M.D.  5/7/2021  1:51 AM

## 2021-05-07 NOTE — ED NOTES
Pt noted to be febrile with oral temp of 101.2 F. ERP notified. Pt medicated with tylenol and ibuprofen for fever per MAR.

## 2021-05-07 NOTE — ED NOTES
Pt to G27, c/o fever (T max 104 at 1200), body aches, nausea & tremors of L hand.     Reports 1st Moderna covid vaccine 3 wks ago.      Pt in gown, on monitor, chart up for ERP.

## 2021-05-07 NOTE — PROGRESS NOTES
2 RN skin check completed with Tanya YATES. Patient with small superficial scab on right great toe, stable lumbar puncture site covered by bandaid, no signs symptoms of complication. No other skin breakdown noted.

## 2021-05-07 NOTE — ED TRIAGE NOTES
Chief Complaint   Patient presents with   • Coronavirus Screening     fever, nausea, body aches

## 2021-05-08 PROBLEM — R73.9 HYPERGLYCEMIA: Status: RESOLVED | Noted: 2021-01-01 | Resolved: 2021-01-01

## 2021-05-08 PROBLEM — B19.10 HEPATITIS B: Status: RESOLVED | Noted: 2020-01-01 | Resolved: 2021-01-01

## 2021-05-08 PROBLEM — K85.90 PANCREATITIS: Status: RESOLVED | Noted: 2020-01-01 | Resolved: 2021-01-01

## 2021-05-08 NOTE — PROGRESS NOTES
[facility] Medicine Daily Progress Note    Date of Service  5/8/2021    Chief Complaint  51 y.o. male admitted 5/6/2021 with, fever, nausea, body aches, shivering    Hospital Course  51-year-old male past medical history of HIV, noncompliance, diabetes, drug abuse presenting complaining of fever, nausea, body aches.  Refer to H&P of Dr. Lubin for further details.  On arrival patient tachycardic, febrile, WBC 9.6 (higher from his baseline), septic.  LP was performed by ED physician.  Glucose CSF elevated, total protein CSF elevated otherwise unremarkable.  CT of the head no evidence of acute intracranial process.  Patient denies cough, abdominal pain, diarrhea.  He reports trouble with voiding.  He was started on ceftriaxone and acyclovir.   ID Dr. Velazquez consulted.  Brain MRI/echocardiogram unremarkable.  Per Dr. Velazquez patient not been seen by [facility] for over a year. Acyclovir was discontinued. Blood culture following day positive gram-positive cocci possible strep.  Clear source at this time.  Echocardiogram pending.  Last time he used IV drugs was many months ago, per pt history       Interval Problem Update  5/8-He is more awake.  He tells me he is feeling better.  He still has shivering overnight.  Asking for breakfast.  Remain afebrile overnight.  Follow-up with ID recommendations  echoardiogram pending  Bacteremia gram-positive cocci possible strep    Consultants/Specialty  Dr. Velazquez, infectious disease physician    Code Status  Full Code    Disposition  Inpatient    Review of Systems  Review of Systems   Constitutional: Positive for chills and malaise/fatigue. Negative for fever.   HENT: Negative for congestion and sore throat.    Respiratory: Negative for cough, sputum production, shortness of breath and wheezing.    Cardiovascular: Negative for chest pain, palpitations and leg swelling.   Gastrointestinal: Negative for abdominal pain, diarrhea, nausea and vomiting.   Genitourinary: Negative for  flank pain.        Trouble voiding for quite some time   Musculoskeletal: Negative for back pain.   Skin: Negative for itching.   Neurological: Negative for focal weakness and headaches.   Psychiatric/Behavioral: Positive for substance abuse.        Physical Exam  Temp:  [36.2 °C (97.1 °F)-37.4 °C (99.3 °F)] 36.7 °C (98.1 °F)  Pulse:  [86-99] 87  Resp:  [16-20] 16  BP: (103-128)/(54-78) 116/59  SpO2:  [93 %-96 %] 96 %    Physical Exam  Vitals and nursing note reviewed.   Constitutional:       Appearance: He is ill-appearing. He is not toxic-appearing.   HENT:      Head: Normocephalic and atraumatic.      Right Ear: External ear normal.      Left Ear: External ear normal.   Eyes:      General: No scleral icterus.  Cardiovascular:      Rate and Rhythm: Normal rate.      Heart sounds: No murmur.   Pulmonary:      Effort: Pulmonary effort is normal.      Breath sounds: No wheezing or rales.   Abdominal:      General: There is no distension.      Palpations: Abdomen is soft.      Tenderness: There is no abdominal tenderness. There is no guarding.   Musculoskeletal:         General: No swelling.   Skin:     Coloration: Skin is not jaundiced.   Neurological:      Mental Status: He is alert and oriented to person, place, and time.   Psychiatric:         Mood and Affect: Mood normal.         Behavior: Behavior normal.         Thought Content: Thought content normal.         Judgment: Judgment normal.         Fluids    Intake/Output Summary (Last 24 hours) at 5/8/2021 1023  Last data filed at 5/8/2021 0600  Gross per 24 hour   Intake 3571.25 ml   Output 350 ml   Net 3221.25 ml       Laboratory  Recent Labs     05/06/21  1852 05/07/21  0335   WBC 9.6 5.6   RBC 4.57* 4.48*   HEMOGLOBIN 12.9* 12.5*   HEMATOCRIT 37.0* 36.7*   MCV 81.0* 81.9   MCH 28.2 27.9   MCHC 34.9 34.1   RDW 38.9 40.3   PLATELETCT 177 144*   MPV 10.2 10.3     Recent Labs     05/06/21  1852 05/07/21  0335   SODIUM 126* 130*   POTASSIUM 4.4 4.0   CHLORIDE 97  101   CO2 23 25   GLUCOSE 347* 281*   BUN 19 18   CREATININE 0.77 0.74   CALCIUM 8.7 7.5*     Recent Labs     05/07/21  0658   APTT 37.3*   INR 1.09         Recent Labs     05/07/21  0335   TRIGLYCERIDE 104   HDL 21*   LDL 62       Imaging  MR-BRAIN-WITH & W/O   Final Result      1.  Mild supratentorial white matter disease. Nonspecific findings consistent with microvascular ischemic change versus demyelination or gliosis.   2.  Concerning the history of HIV, no imaging evidence of CNS lymphoma, HIV encephalopathy, toxoplasmosis, PML, or other opportunistic infection.      DX-CHEST-PORTABLE (1 VIEW)   Final Result      No acute cardiopulmonary abnormality.      CT-HEAD W/O   Final Result      No evidence of acute intracranial process.      EC-ECHOCARDIOGRAM COMPLETE W/O CONT    (Results Pending)        Assessment/Plan  Fever- (present on admission)  Assessment & Plan  Due to sepsis   5/8-remain febrile overnight    Sepsis (HCC)- (present on admission)  Assessment & Plan  This is Sepsis Present on admission  SIRS criteria identified on my evaluation include: Fever, with temperature greater than 101 deg F, Tachycardia, with heart rate greater than 90 BPM and Tachypnea, with respirations greater than 20 per minute  Source is Unknown  Suspected onset of infection (date and time) Unknown  Sepsis protocol initiated  Fluid resuscitation ordered per protocol  IV antibiotics as appropriate for source of sepsis (ceftriaxone)   While organ dysfunction may be noted elsewhere in this problem list or in the chart, degree of organ dysfunction does not meet CMS criteria for severe sepsis    Crypto negative , HSV, HIV viral load pending  covid negative   X-ray unremarkable  blood cultures positive for strep . No clear source   Echocardiogram pending  MRI brain unremarkable  LP performed in ED unremarkable . Culture to follow     5/8- stop IVF .  Continue ceftriaxone,, MAC coverage and Bactrim per ID recs.  Consider MATHEW after  echocardiogram if ID will recommend        HIV (human immunodeficiency virus infection) ([facility])- (present on admission)  Assessment & Plan  cd4 and viral count profile pending   Dr. Velazquez following           Hyponatremia- (present on admission)  Assessment & Plan  Hypovolemic, hypotonic. improving IV fluid  asymptomatic  Trend      Pancytopenia (HCC)- (present on admission)  Assessment & Plan  2/2 HIV  trend    Elevated alkaline phosphatase level- (present on admission)  Assessment & Plan  chronic    Chronic pain- (present on admission)  Assessment & Plan  Back pain  Currently exacerbated by LP performed per patient  Ketorolac   Fentanyl prn    Methamphetamine abuse ([facility])- (present on admission)  Assessment & Plan  Drug tox    DM (diabetes mellitus) ([facility])- (present on admission)  Assessment & Plan  Noncompliant  Start glargine 15 units, continue insulin sliding scale  Hypoglycemia protocol  Glucose improving       VTE prophylaxis: heparin

## 2021-05-08 NOTE — HOSPITAL COURSE
51-year-old male past medical history of HIV, noncompliance, diabetes, drug abuse presenting complaining of fever, nausea, body aches.  Refer to H&P of Dr. Lubin for further details.  On arrival patient tachycardic, febrile, WBC 9.6 (higher from his baseline), septic.  LP was performed by ED physician.  Glucose CSF elevated, total protein CSF elevated otherwise unremarkable.  CT of the head no evidence of acute intracranial process.  Patient denies cough, abdominal pain, diarrhea.  He reports trouble with voiding.  He was started on ceftriaxone and acyclovir.   ID Dr. Velazquez consulted.  Brain MRI/echocardiogram unremarkable.  Per Dr. Velazquez patient not been seen by [facility] for over a year. Acyclovir was discontinued.  Patient was started azithromycin and ethambutol for possible MAC treatment. blood culture following day positive gram-positive cocci strep.  No clear source at this time.  Echocardiogram unremarkable, no vegetation, mild mitral regurgitation and tricuspid regurgitation.  Mild pulmonary hypertension.  EF 70% .last time he used IV drugs was many months ago, per pt history .5/9 ethambutol discontinued per Dr. Velazquez recommendation.  Blood culture from 5/7 negative.

## 2021-05-08 NOTE — PROGRESS NOTES
Bedside report received from day shift RN. Communication board updated, bed locked and in lowest position and call bell within reach. POC discussed with pt; all questions answered at this time.

## 2021-05-09 NOTE — PROGRESS NOTES
51 year old male with advanced AIDS admitted with fever, chills, tremors , and sepsis.   Evaluation included LP, blood cultures and broad spectrum antibiotic nd anti-MAC therapy.   Blood culture from admit turned positive yesterday.  Feeling better today with resolved chills and fever    Meds  Scheduled Medications   Medication Dose Frequency   • insulin glargine  0.2 Units/kg/day Q EVENING    And   • insulin lispro  2-9 Units 4X/DAY ACHS   • senna-docusate  2 tablet BID   • heparin  5,000 Units Q8HRS   • cefTRIAXone  2 g Q24HRS   • azithromycin  500 mg DAILY   • sulfamethoxazole-trimethoprim  1 tablet DAILY   Review of Systems   Constitutional: Positive for malaise/fatigue. Negative for chills and fever.   Eyes: Negative for blurred vision.   Respiratory: Negative for cough, hemoptysis and sputum production.    Cardiovascular: Negative for chest pain and leg swelling.   Gastrointestinal: Negative for abdominal pain, diarrhea, nausea and vomiting.   Genitourinary: Negative for dysuria.   Skin: Negative for rash.   Neurological: Negative for headaches.     Physical Exam   Constitutional: He is oriented to person, place, and time and well-developed, well-nourished, and in no distress.   HENT:   Mouth/Throat: Oropharynx is clear and moist.   Eyes: Pupils are equal, round, and reactive to light.   Cardiovascular: Normal rate, regular rhythm and normal heart sounds.   No murmur heard.  Pulmonary/Chest: Breath sounds normal. He has no wheezes. He has no rales.   Abdominal: Soft. He exhibits no distension. There is no abdominal tenderness. There is no rebound.   Lymphadenopathy:     He has no cervical adenopathy.   Neurological: He is alert and oriented to person, place, and time.   Skin: No rash noted.   Nursing note and vitals reviewed.  Results for VANESSA ARCHIBALD (MRN [id number]) as of 5/9/2021 08:33   Ref. Range 5/7/2021 12:19   Cd4-Cd8 Ratio Latest Ref Range: 0.80 - 3.90 ratio 0.11 (L)   Cd3 Ct Latest Ref Range:  570 - 2400 cells/uL 982   Cd4 -T4 [place] Cells Latest Ref Range: 430 - 1800 cells/uL 96 (L)   Cd8 -T8 Suppressor Cells Latest Ref Range: 210 - 1200 cells/uL 841   Interpretation Unknown See Note     Component 3 d ago   Significant Indicator POSPositive (POS) P    Source BLD P    Site PERIPHERAL P    Culture Result Growth detected by Bactec instrument. 05/08/2021  02:13Abnormal  P    Culture Result Abnormal   Streptococcus agalactiae (Group B)   Susceptibilities in progress   P      Resulting Agency [facility]      Narrative  Performed by: M        Specimen Collected: 05/06/21  6:52 PM Last Resulted: 05/09/21  8:19 AM         Lab Flowsheet      Order Details      View Encounter      Lab and Collection Details      Routing      Result History        P=Value has a preliminary status           BLOOD CULTURE [[id number]]    Awaiting signature from: Tony Pena M.D. Status: Completed   Mode: Ordering in Verbal with read back mode Communicated by: Alex Dewitt R.N.   Ordering user: Alex Dewitt R.N. 05/07/21 1300 Ordering provider: Tony Pena M.D.   Authorized by: Tony Pena M.D.    Add Signature Requirement   Frequency: Once 05/07/21 1300 - 1  occurrence   Questionnaire  CSF CULTURE  Order: [id number]  Status:  Preliminary result   Visible to patient:  No (not released) Next appt:  None  Specimen Information: Tap; CSF        Component 3 d ago   Significant Indicator NEG P    Source CSF P    Site TAP P    Culture Result No growth at 24 hours. P    Gram Stain Result Rare WBCs.   No organisms seen.    Resulting Agency [facility]                 Group B strep Bacteremia - source not clear, no evidence for endocarditis with negative TTE and no murmur. Doing much better today. Tremors resolving and exam is basically normal .CSF culture so far negative . Will continue iv ceftriaxone, stop azithro and ethambutol as we now have alternative explanation for his sepsis  AIDS- CD4 down to 96. Had extended discussion with him ,  at high  Risk for death or severe opportunistic infection unless he gets back on therapy. He expresses understanding  PLAN:   1. Cont ceftriaxone  2. Stop etambutol and azithromycin  3. Continue Bactrim for PCP prophylaxis  4. Tomorrow if cont to do well and  5/7 blood culture remains neg, will likely transition to outpatient management . Will then see me later in week at [facility] for restarting HIV therapy   Patient agrees with plan

## 2021-05-09 NOTE — PROGRESS NOTES
Bedside report received from day shift RN. POC discussed with pt; all questions answered at this time.

## 2021-05-09 NOTE — CARE PLAN
Problem: Communication  Goal: The ability to communicate needs accurately and effectively will improve  Outcome: PROGRESSING AS EXPECTED     Problem: Safety  Goal: Will remain free from injury  Outcome: PROGRESSING AS EXPECTED     Problem: Knowledge Deficit  Goal: Knowledge of disease process/condition, treatment plan, diagnostic tests, and medications will improve  Outcome: PROGRESSING SLOWER THAN EXPECTED     Problem: Respiratory:  Goal: Respiratory status will improve  Outcome: PROGRESSING AS EXPECTED

## 2021-05-09 NOTE — PROGRESS NOTES
[facility] Medicine Daily Progress Note    Date of Service  5/9/2021    Chief Complaint  51 y.o. male admitted 5/6/2021 with, fever, nausea, body aches, shivering    Hospital Course  51-year-old male past medical history of HIV, noncompliance, diabetes, drug abuse presenting complaining of fever, nausea, body aches.  Refer to H&P of Dr. Lubin for further details.  On arrival patient tachycardic, febrile, WBC 9.6 (higher from his baseline), septic.  LP was performed by ED physician.  Glucose CSF elevated, total protein CSF elevated otherwise unremarkable.  CT of the head no evidence of acute intracranial process.  Patient denies cough, abdominal pain, diarrhea.  He reports trouble with voiding.  He was started on ceftriaxone and acyclovir.   ID Dr. Velazquez consulted.  Brain MRI/echocardiogram unremarkable.  Per Dr. Velazquez patient not been seen by [facility] for over a year. Acyclovir was discontinued.  Patient was started azithromycin and ethambutol for possible MAC treatment. blood culture following day positive gram-positive cocci strep.  No clear source at this time.  Echocardiogram unremarkable, no vegetation, mild mitral regurgitation and tricuspid regurgitation.  Mild pulmonary hypertension.  EF 70% .last time he used IV drugs was many months ago, per pt history .5/9 ethambutol discontinued per Dr. Velazquez recommendation.  Blood culture from 5/7 negative.       Interval Problem Update  5/8-He is more awake.  He tells me he is feeling better.  He still has shivering overnight.  Asking for breakfast.  Remain afebrile overnight.  Follow-up with ID recommendations  echoardiogram pending  Bacteremia gram-positive cocci possible strep  5/9-patient feeling better.  Tremor, shivering resolved.  Remain afebrile.  Culture positive as per above.  Appreciate condition per Dr. Velazquez.  hopefully discharge tomorrow on oral anticoagulant     Consultants/Specialty  Dr. Velazquez, infectious disease physician    Code  Status  Full Code    Disposition  Inpatient    Review of Systems  Review of Systems   Constitutional: Positive for malaise/fatigue. Negative for chills and fever.   HENT: Negative for congestion and sore throat.    Respiratory: Negative for cough, sputum production, shortness of breath and wheezing.    Cardiovascular: Negative for chest pain, palpitations and leg swelling.   Gastrointestinal: Negative for abdominal pain, diarrhea, nausea and vomiting.   Genitourinary: Negative for flank pain.        Trouble voiding for quite some time   Musculoskeletal: Negative for back pain.   Skin: Negative for itching.   Neurological: Negative for focal weakness and headaches.   Psychiatric/Behavioral: Positive for substance abuse.        Physical Exam  Temp:  [36.3 °C (97.3 °F)-36.8 °C (98.3 °F)] 36.8 °C (98.3 °F)  Pulse:  [] 82  Resp:  [14-18] 18  BP: (114-144)/(64-76) 114/71  SpO2:  [94 %-97 %] 96 %    Physical Exam  Vitals and nursing note reviewed.   Constitutional:       Appearance: He is ill-appearing. He is not toxic-appearing.   HENT:      Head: Normocephalic and atraumatic.      Right Ear: External ear normal.      Left Ear: External ear normal.   Eyes:      General: No scleral icterus.  Cardiovascular:      Rate and Rhythm: Normal rate.      Heart sounds: No murmur.   Pulmonary:      Effort: Pulmonary effort is normal.      Breath sounds: No wheezing or rales.   Abdominal:      General: There is no distension.      Palpations: Abdomen is soft.      Tenderness: There is no abdominal tenderness. There is no guarding.   Musculoskeletal:         General: No swelling.   Skin:     Coloration: Skin is not jaundiced.   Neurological:      Mental Status: He is alert and oriented to person, place, and time.   Psychiatric:         Mood and Affect: Mood normal.         Behavior: Behavior normal.         Thought Content: Thought content normal.         Judgment: Judgment normal.         Fluids    Intake/Output Summary (Last  24 hours) at 5/9/2021 1018  Last data filed at 5/8/2021 2200  Gross per 24 hour   Intake 500 ml   Output --   Net 500 ml       Laboratory  Recent Labs     05/06/21  1852 05/07/21  0335 05/08/21  1638   WBC 9.6 5.6 4.0*   RBC 4.57* 4.48* 4.27*   HEMOGLOBIN 12.9* 12.5* 11.8*   HEMATOCRIT 37.0* 36.7* 35.0*   MCV 81.0* 81.9 82.0   MCH 28.2 27.9 27.6   MCHC 34.9 34.1 33.7   RDW 38.9 40.3 40.6   PLATELETCT 177 144* 135*   MPV 10.2 10.3 10.6     Recent Labs     05/06/21  1852 05/07/21  0335 05/08/21  1638   SODIUM 126* 130* 131*   POTASSIUM 4.4 4.0 4.2   CHLORIDE 97 101 104   CO2 23 25 22   GLUCOSE 347* 281* 177*   BUN 19 18 21   CREATININE 0.77 0.74 0.75   CALCIUM 8.7 7.5* 8.0*     Recent Labs     05/07/21  0658   APTT 37.3*   INR 1.09         Recent Labs     05/07/21  0335   TRIGLYCERIDE 104   HDL 21*   LDL 62       Imaging  EC-ECHOCARDIOGRAM COMPLETE W/O CONT   Final Result      MR-BRAIN-WITH & W/O   Final Result      1.  Mild supratentorial white matter disease. Nonspecific findings consistent with microvascular ischemic change versus demyelination or gliosis.   2.  Concerning the history of HIV, no imaging evidence of CNS lymphoma, HIV encephalopathy, toxoplasmosis, PML, or other opportunistic infection.      DX-CHEST-PORTABLE (1 VIEW)   Final Result      No acute cardiopulmonary abnormality.      CT-HEAD W/O   Final Result      No evidence of acute intracranial process.           Assessment/Plan  Fever- (present on admission)  Assessment & Plan  Due to sepsis   5/8-remain febrile overnight  5/9-resolved    Sepsis (HCC)- (present on admission)  Assessment & Plan  This is Sepsis Present on admission  SIRS criteria identified on my evaluation include: Fever, with temperature greater than 101 deg F, Tachycardia, with heart rate greater than 90 BPM and Tachypnea, with respirations greater than 20 per minute  Source is Unknown  Suspected onset of infection (date and time) Unknown  Sepsis protocol initiated  Fluid  resuscitation ordered per protocol  IV antibiotics as appropriate for source of sepsis (ceftriaxone)   While organ dysfunction may be noted elsewhere in this problem list or in the chart, degree of organ dysfunction does not meet CMS criteria for severe sepsis    Crypto negative , HSV, HIV viral load pending  covid negative   X-ray unremarkable  blood cultures positive for strep . No clear source   Echocardiogram pending  MRI brain unremarkable  LP performed in ED unremarkable . Culture to follow     5/8- stop IVF .  Continue ceftriaxone,, MAC coverage and Bactrim per ID recs.  Consider MATHEW after echocardiogram if ID will recommend  5/9- no need per MATHEW.  Continue ceftriaxone for today.  Hopefully addition to oral antibiotic tomorrow.  Sepsis resolved        HIV (human immunodeficiency virus infection) (HCC)- (present on admission)  Assessment & Plan  cd4 and viral count profile complete  Dr. Velazquez following   Continue bactrim per prophylaxis         Hyponatremia- (present on admission)  Assessment & Plan  Hypovolemic, hypotonic. improving IV fluid  asymptomatic  Trend      Pancytopenia (HCC)- (present on admission)  Assessment & Plan  2/2 HIV  trend    Elevated alkaline phosphatase level- (present on admission)  Assessment & Plan  chronic    Chronic pain- (present on admission)  Assessment & Plan  Back pain  Currently exacerbated by LP performed per patient  Ketorolac   Fentanyl prn    Methamphetamine abuse (HCC)- (present on admission)  Assessment & Plan  Drug tox  Consuing provided    DM (diabetes mellitus) (HCC)- (present on admission)  Assessment & Plan  Noncompliant  Start glargine 15 units, continue insulin sliding scale  Hypoglycemia protocol  Glucose improving  5/9-add Metformin  mg daily.  Continue to monitor.  Continue the same Lantus 15 units at bedtime       VTE prophylaxis: heparin

## 2021-05-10 PROBLEM — R50.9 FEVER: Status: RESOLVED | Noted: 2020-03-10 | Resolved: 2021-01-01

## 2021-05-10 NOTE — PROGRESS NOTES
Double checked with MD that patient was only receiving diabetic medications from [facility]s-to-beds and that he was to  the antibiotic and HIV medications from the [place] [facility]. Also made sure the MD was aware that the antibiotic and HIV medication did not appear on the discharge paperwork but prescriptions were sent.

## 2021-05-10 NOTE — CARE PLAN
Problem: Communication  Goal: The ability to communicate needs accurately and effectively will improve  Outcome: PROGRESSING AS EXPECTED  Intervention: [place] patient and significant other/support system to call light to alert staff of needs  Flowsheets (Taken 5/10/2021 0905)  Oriented to:: All of the Following : Location of Bathroom, Visiting Policy, Unit Routine, Call Light and Bedside Controls, Bedside Rail Policy, Smoking Policy, Rights and Responsibilities, Bedside Report, and Patient Education Notebook  Note: Complete   Intervention: Reorient patient to environment as needed  Flowsheets (Taken 5/10/2021 0905)  Oriented to:: All of the Following : Location of Bathroom, Visiting Policy, Unit Routine, Call Light and Bedside Controls, Bedside Rail Policy, Smoking Policy, Rights and Responsibilities, Bedside Report, and Patient Education Notebook  Note: Complete      Problem: Safety  Goal: Will remain free from falls  Outcome: PROGRESSING AS EXPECTED  Intervention: Assess risk factors for falls  Note: Patient is no risk   Intervention: Implement fall precautions  Flowsheets  Taken 5/10/2021 0905  Bed Alarm: Alarm Not On  Taken 5/10/2021 0720  Environmental Precautions:   Treaded Slipper Socks on Patient   Personal Belongings, Wastebasket, Call Bell etc. in Easy Reach   Transferred to Stronger Side   Report Given to Other Health Care Providers Regarding Fall Risk   Bed in Low Position   Communication Sign for Patients & Families   Mobility Assessed & Appropriate Sign Placed  Note: Complete

## 2021-05-10 NOTE — DISCHARGE SUMMARY
Discharge Summary    CHIEF COMPLAINT ON ADMISSION  Chief Complaint   Patient presents with   • Coronavirus Screening     fever, nausea, body aches       Reason for Admission  Escorted by EMS; Flu Like Symptoms     Admission Date  5/6/2021    CODE STATUS  Full Code    HPI & HOSPITAL COURSE    51-year-old male past medical history of HIV, noncompliance, diabetes, drug abuse presenting complaining of fever, nausea, body aches.  Refer to H&P of Dr. Lubin for further details.  On arrival patient tachycardic, febrile, WBC 9.6 (higher from his baseline), septic.  LP was performed by ED physician.  Glucose CSF elevated, total protein CSF elevated otherwise unremarkable.  CT of the head no evidence of acute intracranial process.  Patient denies cough, abdominal pain, diarrhea.  He reports trouble with voiding.  He was started on ceftriaxone and acyclovir.   ID Dr. Velazquez consulted.  Brain MRI/echocardiogram unremarkable.  Per Dr. Velazquez patient not been seen by [facility] for over a year. Acyclovir was discontinued 5/7/2021.  Patient was started azithromycin and ethambutol for possible MAC treatment. blood culture 5/6 positive gram-positive cocci strep.  No clear source at this time.  Echocardiogram unremarkable, no vegetation, mild mitral regurgitation and tricuspid regurgitation.  Mild pulmonary hypertension.  EF 70% .last time he used IV drugs was many months ago, per pt history .5/9 ethambutol discontinued per Dr. Velazquez recommendation.  Blood culture from 5/7 negative.   5/10-patient remained afebrile overnight.  Vitals stable.  Patient feeling well to his baseline.  Infectious disease will recommend 2 weeks of amoxicillin 3 times a day.  He will continue Bactrim for PCP prophylaxis.  Instruction provided to patient to go to our clinic to  HIV meds and above antibiotics.   Lantus 15 units at bedtime provided with Metformin 750 mg daily .  Patient will continue to monitor and follow-up with primary  care      Therefore, he is discharged in guarded and stable condition to home with close outpatient follow-up.    The patient met 2-midnight criteria for an inpatient stay at the time of discharge.    Discharge Date  05/10/2021    FOLLOW UP ITEMS POST DISCHARGE  Follow-up with [facility] to  antibiotic and HIV medication  Follow-up on blood cultures    DISCHARGE DIAGNOSES  Active Problems:    HIV (human immunodeficiency virus infection) (HCC) POA: Yes    Hyponatremia POA: Yes    Hyperglycemia POA: Yes    Hepatitis B POA: Yes    DM (diabetes mellitus) (HCC) POA: Yes    Methamphetamine abuse (HCC) POA: Yes    Chronic pain POA: Yes    Elevated alkaline phosphatase level POA: Yes    Pancytopenia (HCC) POA: Yes  Resolved Problems:    Sepsis (HCC) POA: Yes    Fever POA: Yes      FOLLOW UP  No future appointments.  Miladys Sanchez P.A.-C.  [address] [facility] [address]  [phone]    Schedule an appointment as soon as possible for a visit        MEDICATIONS ON DISCHARGE     Medication List      START taking these medications      Instructions   metFORMIN  MG Tb24  Commonly known as: GLUCOPHAGE XR   Take 1 tablet by mouth every night with dinner.  Dose: 750 mg        CHANGE how you take these medications      Instructions   insulin glargine 100 UNIT/ML Sopn injection  What changed: how much to take  Generic drug: insulin glargine   Inject 15 Units under the skin every evening.  Dose: 15 Units        CONTINUE taking these medications      Instructions   Alcohol Swabs   Doctor's comments: Per formulary preference. ICD-10 code: E11.65 Uncontrolled type 2 Diabetes Mellitus  Wipe site with prep pad prior to injection.     * Blood Glucose Meter Kit   Doctor's comments: Or per formulary preference. ICD-10 code: E11.65 Uncontrolled type 2 Diabetes Mellitus  Test blood sugar as recommended by provider. True Metrix blood glucose monitoring kit.     * Blood Glucose Test Strips   Doctor's comments: Or per  formulary preference. ICD-10 code: E11.65 Uncontrolled type 2 Diabetes Mellitus  Use one True Metrix strip to test blood sugar twice daily before meals.     Insulin Pen Needle 32 G x 4 mm   Doctor's comments: Per patient/formulary preference. ICD-10 code: E11.65 Uncontrolled type 2 Diabetes Mellitus  Use one pen needle in pen device to inject insulin once daily.     Lancets   Doctor's comments: Or per formulary preference. ICD-10 code: E11.65 Uncontrolled type 2 Diabetes Mellitus  Use one True Metrix lancet to test blood sugar twice daily before meals.         * This list has 2 medication(s) that are the same as other medications prescribed for you. Read the directions carefully, and ask your doctor or other care provider to review them with you.                Allergies  Allergies   Allergen Reactions   • Peanut-Derived Anaphylaxis   • Hydrocodone-Acetaminophen Vomiting   • Penicillins Hives     Patient tolerated Ancef 08/13/15       DIET  Orders Placed This Encounter   Procedures   • Diet Order Diet: Regular     Standing Status:   Standing     Number of Occurrences:   1     Order Specific Question:   Diet:     Answer:   Regular [1]       ACTIVITY  As tolerated.  Weight bearing as tolerated    CONSULTATIONS  ID     PROCEDURES  None     LABORATORY  Lab Results   Component Value Date    SODIUM 131 (L) 05/10/2021    POTASSIUM 3.9 05/10/2021    CHLORIDE 102 05/10/2021    CO2 23 05/10/2021    GLUCOSE 100 (H) 05/10/2021    BUN 13 05/10/2021    CREATININE 0.54 05/10/2021    CREATININE 0.9 01/22/2008        Lab Results   Component Value Date    WBC 4.0 (L) 05/10/2021    HEMOGLOBIN 12.2 (L) 05/10/2021    HEMATOCRIT 35.6 (L) 05/10/2021    PLATELETCT 171 05/10/2021        Total time of the discharge process exceeds 35 minutes.

## 2021-05-10 NOTE — DISCHARGE INSTRUCTIONS
Discharge Instructions per Shoshana Adame M.D.  Follow up at [facility] today to  antibiotic and HIV medications   Continue to monitor glucose daily variant time. Very important to check fasting ones few times. Write them down and follow up with PCP     DIET: healthy     ACTIVITY: as tolerated     DIAGNOSIS: sepsis, bacteremia     Return to ER if confusion, fever, chest pain, shortness of breath, leg swelling, diarrhea, severe abdominal pain, unusual bleeding, severe headache, focal weakness              Discharge Instructions    Discharged to home by car with relative. Discharged via wheelchair, hospital escort: Yes.  Special equipment needed: Not Applicable    Be sure to schedule a follow-up appointment with your primary care doctor or any specialists as instructed.     Discharge Plan:   Diet Plan: Discussed  Activity Level: Discussed  Confirmed Follow up Appointment: No (Comments)  Confirmed Symptoms Management: Discussed  Medication Reconciliation Updated: Yes    I understand that a diet low in cholesterol, fat, and sodium is recommended for good health. Unless I have been given specific instructions below for another diet, I accept this instruction as my diet prescription.   Other diet: reg    Special Instructions: None    · Is patient discharged on Warfarin / Coumadin?   No     Depression / Suicide Risk    As you are discharged from this [facility] facility, it is important to learn how to keep safe from harming yourself.    Recognize the warning signs:  · Abrupt changes in personality, positive or negative- including increase in energy   · Giving away possessions  · Change in eating patterns- significant weight changes-  positive or negative  · Change in sleeping patterns- unable to sleep or sleeping all the time   · Unwillingness or inability to communicate  · Depression  · Unusual sadness, discouragement and loneliness  · Talk of wanting to die  · Neglect of personal  appearance   · Rebelliousness- reckless behavior  · Withdrawal from people/activities they love  · Confusion- inability to concentrate     If you or a loved one observes any of these behaviors or has concerns about self-harm, here's what you can do:  · Talk about it- your feelings and reasons for harming yourself  · Remove any means that you might use to hurt yourself (examples: pills, rope, extension cords, firearm)  · Get professional help from the community (Mental Health, Substance Abuse, psychological counseling)  · Do not be alone:Call your Safe Contact- someone whom you trust who will be there for you.  · Call your local CRISIS HOTLINE 551-9592 or 876-925-9415  · Call your local Children's Mobile Crisis Response Team [place] (187) 447-1872 or www.avocarrot  · Call the toll free National Suicide Prevention Hotlines   · National Suicide Prevention Lifeline 806-598-WBNU (8588)  · National [place] Network 800-SUICIDE (342-5775)        Sepsis, Diagnosis, Adult  Sepsis is a serious bodily reaction to an infection. The infection that triggers sepsis may be from a bacteria, virus, or fungus. Sepsis can result from an infection in any part of your body. Infections that commonly lead to sepsis include skin, lung, and urinary tract infections.  Sepsis is a medical emergency that must be treated right away in a hospital. In severe cases, it can lead to septic shock. Septic shock can weaken your heart and cause your blood pressure to drop. This can cause your central nervous system and your body's organs to stop working.  What are the causes?  This condition is caused by a severe reaction to infections from bacteria, viruses, or fungus. The germs that most often lead to sepsis include:  · Escherichia coli (E. coli) bacteria.  · Staphylococcus aureus (staph) bacteria.  · Some types of Streptococcus bacteria.  The most common infections affect these organs:  · The lung (pneumonia).  · The kidneys or bladder  (urinary tract infection).  · The skin (cellulitis).  · The bowel, gallbladder, or pancreas.  What increases the risk?  You are more likely to develop this condition if:  · Your body's disease-fighting system (immune system) is weakened.  · You are age 65 or older.  · You are male.  · You had surgery or you have been hospitalized.  · You have these devices inserted into your body:  ? A small, thin tube (catheter).  ? IV line.  ? Breathing tube.  ? Drainage tube.  · You are not getting enough nutrients from food (malnourished).  · You have a long-term (chronic) disease, such as cancer, lung disease, kidney disease, or diabetes.  · You are .  What are the signs or symptoms?  Symptoms of this condition may include:  · Fever.  · Chills or feeling very cold.  · Confusion or anxiety.  · Fatigue.  · Muscle aches.  · Shortness of breath.  · Nausea and vomiting.  · Urinating much less than usual.  · Fast heart rate (tachycardia).  · Rapid breathing (hyperventilation).  · Changes in skin color. Your skin may look blotchy, pale, or blue.  · Cool, clammy, or sweaty skin.  · Skin rash.  Other symptoms depend on the source of your infection.  How is this diagnosed?  This condition is diagnosed based on:  · Your symptoms.  · Your medical history.  · A physical exam.  Other tests may also be done to find out the cause of the infection and how severe the sepsis is. These tests may include:  · Blood tests.  · Urine tests.  · Swabs from other areas of your body that may have an infection. These samples may be tested (cultured) to find out what type of bacteria is causing the infection.  · Chest X-ray to check for pneumonia. Other imaging tests, such as a CT scan, may also be done.  · Lumbar puncture. This removes a small amount of the fluid that surrounds your brain and spinal cord. The fluid is then examined for infection.  How is this treated?  This condition must be treated in a hospital. Based on the cause of your  infection, you may be given an antibiotic, antiviral, or antifungal medicine.  You may also receive:  · Fluids through an IV.  · Oxygen and breathing assistance.  · Medicines to increase your blood pressure.  · Kidney dialysis. This process cleans your blood if your kidneys have failed.  · Surgery to remove infected tissue.  · Blood transfusion if needed.  · Medicine to prevent blood clots.  · Nutrients to correct imbalances in basic body function (metabolism). You may:  ? Receive important salts and minerals (electrolytes) through an IV.  ? Have your blood sugar level adjusted.  Follow these instructions at home:  Medicines    · Take over-the-counter and prescription medicines only as told by your health care provider.  · If you were prescribed an antibiotic, antiviral, or antifungal medicine, take it as told by your health care provider. Do not stop taking the medicine even if you start to feel better.  General instructions  · If you have a catheter or other indwelling device, ask to have it removed as soon as possible.  · Keep all follow-up visits as told by your health care provider. This is important.  Contact a health care provider if:  · You do not feel like you are getting better or regaining strength.  · You are having trouble coping with your recovery.  · You frequently feel tired.  · You feel worse or do not seem to get better after surgery.  · You think you may have an infection after surgery.  Get help right away if:  · You have any symptoms of sepsis.  · You have difficulty breathing.  · You have a rapid or skipping heartbeat.  · You become confused or disoriented.  · You have a high fever.  · Your skin becomes blotchy, pale, or blue.  · You have an infection that is getting worse or not getting better.  These symptoms may represent a serious problem that is an emergency. Do not wait to see if the symptoms will go away. Get medical help right away. Call your local emergency services (911 in the [place]  Do not drive yourself to the hospital.  Summary  · Sepsis is a medical emergency that requires immediate treatment in a hospital.  · This condition is caused by a severe reaction to infections from bacteria, viruses, or fungus.  · Based on the cause of your infection, you may be given an antibiotic, antiviral, or antifungal medicine.  · Treatment may also include IV fluids, breathing assistance, and kidney dialysis.  This information is not intended to replace advice given to you by your health care provider. Make sure you discuss any questions you have with your health care provider.  Document Released: 09/15/2004 Document Revised: 07/26/2019 Document Reviewed: 07/26/2019  [employer] Patient Education © 2020 [employer]      HIV Infection and AIDS  HIV (human immunodeficiency virus) infection is a permanent (chronic) viral infection. HIV kills white blood cells that are called CD4 cells. These cells help to control the body's defense system (immune system) and fight infection. If a person does not have enough CD4 cells, he or she can develop infections, cancers, and other health problems.  What are the causes?  This virus is passed from person to person:  · Through sex.  · Through contact with infected blood.  · During childbirth or breastfeeding.  What increases the risk?  This condition is more likely to develop in people who:  · Have unprotected sex.  · Share needles or other drug equipment.  What are the signs or symptoms?  Symptoms of this condition usually develop in phases:  Asymptomatic phase  You may not feel sick, or you may only feel sick some of the time. Symptoms may include:  · Low-grade fever.  · Headaches.  · Sore throat.  · Rash.  · Fatigue.  · Nausea, vomiting, or diarrhea.  · Night sweats.  Early symptomatic phase  You may notice:  · Your early symptoms getting worse or happening more often.  · Oral, vaginal, or rectal sores that are caused by infections.  · Problems that are related to  inflammation, such as joint pain.  Symptomatic phase: AIDS, or acquired immunodeficiency syndrome  Your immune system no longer protects you from infections and other health problems. You may get infections that you would not normally get if your immune system was healthy and working properly (opportunistic diseases). Problems that are caused by opportunistic diseases include:  · Coughing.  · Trouble breathing.  · Diarrhea.  · Skin sores.  · Trouble swallowing.  · High fevers.  · Blurred vision.  · Stiff neck.  · Mental confusion.  You may also begin to notice:  · Weight loss.  · Tingling or pain in your hands and feet.  · Mouth sores or tooth pain.  · Severe fatigue.  How is this diagnosed?  This condition is diagnosed with:  · A screening test to check blood for a chemical (antibody) that is produced only when the body is fighting HIV.  · A blood test to confirm the presence of HIV.  How is this treated?  There is no cure for this condition, but treatment can help to keep HIV from getting worse.  · You will be given medicines that may slow down the rate at which HIV multiplies in your body (antiretroviral therapy, or ART). ART may:  ? Keep your immune system as healthy as possible and help it work better.  ? Decrease the amount of HIV in your body.  ? Reduce the risk of problems caused by HIV.  ? Prolong your life.  ? Improve the quality of your life.  ? Help prevent passing HIV to someone.  · You will need to have routine lab tests performed to monitor your treatment and immune system.  Follow these instructions at home:  Medicines  · Take over-the-counter and prescription medicines only as told by your health care provider.  · If you were prescribed an antibiotic medicine, take it as told by your health care provider. Do not stop taking the antibiotic even if you start to feel better.  Lifestyle    · Do not use any products that contain alcohol, nicotine, tobacco, or recreational drugs. These can cause further  damage to your immune system. They can also cause problems with your liver, lungs, and heart. If you need help quitting, ask your health care provider.  · Do not share needles or other equipment used for injecting, smoking, or snorting drugs.  · Protect yourself from other STIs (sexually transmitted infections) by using condoms when you have sex. This includes vaginal, oral, and anal sex.  · Eat in a healthy way, get enough sleep, and exercise.  General instructions    · Tell your sexual partners that you have HIV. Encourage them to get tested.  · Keep your vaccinations up to date. Make sure that you get all recommended vaccines, including vaccines for hepatitis A, hepatitis B, measles, and influenza.  · See your dentist regularly. Brush and floss your teeth every day.  · See a counselor or a  to help you solve problems and find any services that you need.  · Get support from your family and friends.  · Keep all follow-up visits as told by your health care provider. This is important. You will need to have routine blood tests every 3-6 months to monitor your health.  How is this prevented?  To prevent the spread of HIV:  · Talk with your health care provider about protecting your sexual partners from HIV. Your health care provider may encourage your partner to take medicines to decrease the risk of getting HIV (pre-exposure prophylaxis, or PrEP).  · Use a condom every time you have sex. This includes vaginal, oral, and anal sex.  ? The condom should be in place from the beginning of the sexual activity to the end.  ? Use only latex or polyurethane condoms and water-based lubricants.  ? Wearing a condom reduces your risk of spreading HIV.  · Avoid alcohol and recreational drugs that affect your judgment. They may make you forget to use a condom or may increase your chances of participating in high-risk sex.  · Do not share equipment that is used to take drugs, such as needles, syringes, cookers,  tourniquets, pipes, or straws. If you share equipment, clean it before and after you use it.  Contact a health care provider if you have:  · Lost a lot of weight.  · Extreme fatigue.  · Trouble swallowing.  · Vomiting or diarrhea that does not get better.  · Muscle pain or joint pain.  · Any problems that are related to your medicines.  Get help right away if you have:  · A rash that causes your skin to peel.  · Blisters inside your mouth.  · Pain in your abdomen.  · Eye redness or swelling around your eyes.  · A high fever and chills.  · Shortness of breath.  · A cough that is dry (nonproductive) or wet (productive).  · Vision problems, such as blind spots, flashing lights, or decreased or blurred vision.  · A persistent headache, confusion, or changes in the way that you think, feel, or behave (altered mental status).  Summary  · HIV kills white blood cells called CD4 cells that help to control the body's defense system (immune system) and fight infection.  · Symptoms of this condition usually develop in phases. In the asymptomatic phase, you may not feel sick, or you may only feel sick some of the time.  · Treatment will include medicines to slow down the rate at which HIV multiplies in your body (antiretroviral therapy, or ART).  This information is not intended to replace advice given to you by your health care provider. Make sure you discuss any questions you have with your health care provider.  Document Released: 10/02/2015 Document Revised: 10/03/2019 Document Reviewed: 10/03/2019  [employer] Patient Education © 2020 [employer]

## 2021-05-10 NOTE — PROGRESS NOTES
Pt transported of the unit with RN. Pt went of the unit with Wheelchair. Pt verbally acknowledges all discharge instructions, medications, and medications regimen. Pt gathered all personal belongings, Tele box and IV have been removed. Pt home medications reviewed. All questions and needs have been met at this time. Patient knows to  the antibiotic and HIV meds from [facility]. Patient took taxi as the brother was no longer able to pick him up.

## 2021-05-10 NOTE — PROGRESS NOTES
Infectious Disease Progress Note    Date of Service: 5/10/2021  Requesting Physician: Shoshana Adame M.D.       Reason for consult:  Sepsis in an HIV patient    Subjective:  Mr. Riojas feels much better today and denies fevers, chills, abdominal pain, tremor, or weakness. He feels ready to go home and is already dressed. He agrees to follow up in the [facility] clinic in 1 week. Follow up blood cultures negative on 5/7/2021.      Review of Systems:   Review of Systems   Constitutional: Negative for chills and fever.   Respiratory: Negative for cough and shortness of breath.    Gastrointestinal: Negative for abdominal pain, nausea and vomiting.   Genitourinary: Negative for dysuria and hematuria.   Skin: Negative for itching and rash.   Neurological: Negative for focal weakness, weakness and headaches.       Objective Data:   Physical Exam:   Vitals:   Temp:  [36.2 °C (97.2 °F)-37.1 °C (98.8 °F)] 36.2 °C (97.2 °F)  Pulse:  [84-89] 86  Resp:  [17-18] 18  BP: (112-133)/(64-76) 121/76  SpO2:  [94 %-96 %] 96 %    Physical Exam  Constitutional:       Appearance: Normal appearance.   HENT:      Head: Normocephalic and atraumatic.   Cardiovascular:      Rate and Rhythm: Normal rate and regular rhythm.      Heart sounds: No murmur.   Pulmonary:      Effort: Pulmonary effort is normal.      Breath sounds: Normal breath sounds. No wheezing or rhonchi.   Abdominal:      General: Abdomen is flat. There is no distension.      Palpations: Abdomen is soft.      Tenderness: There is no abdominal tenderness.   Musculoskeletal:         General: No swelling.   Skin:     General: Skin is warm and dry.   Neurological:      General: No focal deficit present.      Mental Status: He is alert.      Cranial Nerves: No cranial nerve deficit.   Psychiatric:         Mood and Affect: Mood normal.             Assessment/Plan:    #Sepsis secondary to Group B Streptococcal Bacteremia, resolved  #HIV/AIDS(by CD4 count)  #IVDU  #Insulin-dependent Type 2  Diabetes Mellitus  -Patient presented with sepsis and was found to have group B streptococcal bacteremia without a clear source in the setting of non-compliance with diabetic and HIV medications, with a CD4 count of 96 and a viral load of >500,000. He was started on Ceftriaxone and prophylaxis for PJP with rapid improvement in his clinical status. He will follow-up with us closely in the [facility] clinic. We will prescribe his antibiotics and antiretrovirals in the clinic which he can  tomorrow. He received a dose of Ceftriaxone today which will cover him until that time.  -Follow up at [facility] tomorrow to  medications  -[facility] clinic appointment with Dr. Velazquez on Friday 5/14  -Amoxicillin 1g TID to complete 14 days of treatment  -Bactrim 1 DS tablet daily to prevent PJP  -Biktarvy to be prescribed tomorrow

## 2021-05-10 NOTE — PROGRESS NOTES
Assumed care of pt @ 0715, bedside report received from MILAN Love.  Pt sleeping without any signs of pain.. Bed locked and lowered with call light within reach and questions answered during present time.

## 2021-05-11 NOTE — DISCHARGE PLANNING
Meds-to-Beds: Discharge prescription orders listed below delivered to patient's bedside. RN Jeannie notified. Patient counseled. Patient states he has used the medications previously and is familiar with them.     Vincent Riojas Say   Home Medication Instructions KRISTIN:[id number]    Printed on:05/10/21 5462   Medication Information                      insulin glargine (INSULIN GLARGINE) 100 UNIT/ML Solution Pen-injector injection  Inject 15 Units under the skin every evening.             metFORMIN ER (GLUCOPHAGE XR) 750 MG TABLET SR 24 HR  Take 1 tablet by mouth every night with dinner.                 Katarina Durant, PharmD

## 2021-05-25 PROBLEM — E87.20 LACTIC ACIDOSIS: Status: ACTIVE | Noted: 2021-01-01

## 2021-05-25 NOTE — ASSESSMENT & PLAN NOTE
This is Sepsis Present on admission  SIRS criteria identified on my evaluation include: Fever, with temperature greater than 101 deg F, Tachycardia, with heart rate greater than 90 BPM and Tachypnea, with respirations greater than 20 per minute  Source is gram-positive cocci bacteremia  Suspected onset of infection (date and time) 5/23/21  Sepsis protocol initiated  Fluid resuscitation ordered per protocol  IV antibiotics as appropriate for source of sepsis  While organ dysfunction may be noted elsewhere in this problem list or in the chart, degree of organ dysfunction does not meet CMS criteria for severe sepsis    Recently admitted to [facility] for similar complaints  Found to have group B strep bacteremia  Treated with antibiotics and discharged on 10-day course of amoxicillin  Echocardiogram performed at that admission unremarkable  Now presenting with persistent symptoms, also with murmur not previously documented on prior visit  Concern for possible vegetations  S/p LR 30 cc/kg, NS 1 L, Rocephin  -Continue with Rocephin  -Follow-up blood cultures and urinalysis.  Blood cultures grew strep agalactiae.  Repeat blood cultures negative.  -Tylenol for symptom management  Hold off on IV fluids.  Midodrine for blood pressure support.  Discussed with ID Dr. Velazquez.    Status post MATHEW on 5/26 which showed aortic vegetations with acute aortic regurgitation new heart failure  Cardiology, cardiothoracic surgery  I did speak with intensivist regarding hemodynamics and low blood pressure

## 2021-05-25 NOTE — PROGRESS NOTES
Hospital Medicine Daily Progress Note    Date of Service  5/25/2021    Chief Complaint  51 y.o. male admitted 5/25/2021 with fevers    Hospital Course  Mr. Vincent Riojas is a 51 y.o. male with history of AIDS, diabetes, CAD with MI, brain tumor who presented on 5/25/2021 with fever.  Recently admitted to [facility] for fever 15 days prior and found to have Strep agalactiae bacteremia.  He was discharged on a 10 day course of Amoxicillin which he reports completing without issue.  After completing course he again developed fever, chills, malaise.  The patient was admitted with sepsis, lactic acidosis and started on Vancomycin and Ceftriaxone      Interval Problem Update  Patient was seen and examined at bedside.  I have personally reviewed and interpreted vitals, labs, and imaging.    5/25.  Patient having persistent fevers.  An[facility].  He is somnolent this morning.  Blood cultures already positive for gram-positive cocci and he confirms completed antibiotics as outpatient.  I did reach out to cardiology about MATHEW to rule out vegetations.  I also reached out to infectious disease.      Consultants/Specialty  ID  Cards    Code Status  Full Code    Disposition  Medical clearance    Review of Systems  ROS     Physical Exam  Temp:  [38.8 °C (101.8 °F)-39.3 °C (102.7 °F)] 38.8 °C (101.8 °F)  Pulse:  [106-119] 109  Resp:  [12-31] 18  BP: ()/(38-64) 93/38  SpO2:  [92 %-96 %] 94 %    Physical Exam    Fluids  No intake or output data in the 24 hours ending 05/25/21 0625    Laboratory  Recent Labs     05/25/21 0136   WBC 5.2   RBC 4.64*   HEMOGLOBIN 12.6*   HEMATOCRIT 37.2*   MCV 80.2*   MCH 27.2   MCHC 33.9   RDW 37.2   PLATELETCT 179   MPV 10.8     Recent Labs     05/25/21  0136 05/25/21  0405   SODIUM 127* 129*   POTASSIUM 3.9 4.1   CHLORIDE 90* 97   CO2 26 24   GLUCOSE 281* 245*   BUN 24* 23*   CREATININE 1.04 0.87   CALCIUM 8.7 8.0*     Recent Labs     05/25/21  0405   INR 1.21*                 Imaging  DX-CHEST-PORTABLE (1 VIEW)   Final Result      No radiographic evidence of acute cardiopulmonary process.           Assessment/Plan  * Sepsis (HCC)- (present on admission)  Assessment & Plan  This is Sepsis Present on admission  SIRS criteria identified on my evaluation include: Fever, with temperature greater than 101 deg F, Tachycardia, with heart rate greater than 90 BPM and Tachypnea, with respirations greater than 20 per minute  Source is unknown origin  Suspected onset of infection (date and time) 5/23/21  Sepsis protocol initiated  Fluid resuscitation ordered per protocol  IV antibiotics as appropriate for source of sepsis  While organ dysfunction may be noted elsewhere in this problem list or in the chart, degree of organ dysfunction does not meet CMS criteria for severe sepsis    Recently admitted to [facility] for similar complaints  Found to have group B strep  Treated with antibiotics and discharged on 10-day course of amoxicillin  Echocardiogram performed at that admission unremarkable  Now presenting with persistent symptoms, also with murmur not previously documented on prior visit  Concern for possible vegetations  S/p LR 30 cc/kg, NS 1 L, Vanco/Rocephin  -Continue with vancomycin/Rocephin  -Follow-up blood cultures and urine analysis  -Tylenol for symptom management  -Continue with IV fluids for BP support  -Consider ID consult for recurrent fevers.  Based on clinical course, may benefit from TTE or MATHEW    Fever, unknown origin- (present on admission)  Assessment & Plan  Plan as above    Hyponatremia- (present on admission)  Assessment & Plan  - c/w NS 125cc/hr  - f/u with repeat BMP  - Correction goal 6-8 in 24hrs  - F/u with Serum Osm, Urine Osm, Urine Na    Lactic acidosis- (present on admission)  Assessment & Plan  Lactic acid 2.2  -Trend lactic acids  -Continue with antibiotics  -Continue with IV fluids    Hyperglycemia- (present on admission)  Assessment & Plan  Glucose  281  -A1c  -ISS    VTE: SCD, Lovenox    DM (diabetes mellitus) (HCC)- (present on admission)  Assessment & Plan  Glucose 281  Reports has been compliant with medications  -A1c  -ISS  -C/W home glargine 15 units nightly  -Holding home Metformin       VTE prophylaxis: Enoxaparin      Day 0 note.

## 2021-05-25 NOTE — PROGRESS NOTES
Contacted by Dr. Rader requesting transesophageal echocardiography.  The patient has positive blood culture for gram-positive cocci probably streptococci with persistent fever.  Will arrange for MATHEW as soon as possible.

## 2021-05-25 NOTE — PROGRESS NOTES
Pharmacy Vancomycin Kinetics Note for 5/25/2021     51 y.o. male on Vancomycin day # 1     Vancomycin Indication (AUC Dosing): Endocarditis (goal trough 15-20 mcg/ml)    Provider specified end date: 05/29/21(5 days)    Active Antibiotics (From admission, onward)    Ordered     Ordering Provider       Tue May 25, 2021  4:40 AM    05/25/21 0440  vancomycin (VANCOCIN) 1,000 mg in  mL IVPB  (vancomycin (VANCOCIN) IV (LD + Maintenance))  EVERY 12 HOURS     Note to Pharmacy: Dose per Pharmacokinetic Protocol    Darnell Gillespie M.D.       Tue May 25, 2021  4:37 AM    05/25/21 0437  sulfamethoxazole-trimethoprim (BACTRIM DS) 800-160 MG tablet 1 tablet  DAILY      Darnell Gillespie M.D.       Tue May 25, 2021  4:37 AM    05/25/21 0437  bictegravir-emtricitab-TAF (Biktarvy) -25 mg tablet 1 tablet  DAILY      Darnell Gillespie M.D.       Tue May 25, 2021  3:48 AM    05/25/21 0348  cefTRIAXone (ROCEPHIN) 2 g in  mL IVPB  EVERY 24 HOURS      Darnell Gillespie M.D.    05/25/21 0348  MD Alert...Vancomycin per Pharmacy  (Unknown Source of Infection (Severe Sepsis))  PHARMACY TO DOSE     Question:  Indication(s) for vancomycin?  Answer:  Unknown source of infection    Darnell Gillespie M.D.       Tusin May 25, 2021  2:11 AM    05/25/21 0211  vancomycin (VANCOCIN) 1,750 mg in  mL IVPB  (vancomycin (VANCOCIN) IV (LD + Maintenance))  ONCE      Joyce Badillo M.D.          Dosing Weight: 74.8 kg (164 lb 14.5 oz)      Admission History: Admitted on 5/25/2021 for Sepsis (HCC) [A41.9]  Pertinent history: Pt admitted for fever and chills. Significant cardiac murmur on physical exam, concern for septic endocarditis. Pt was discharged from [facility] 2 weeks ago, treated for group B strep bacteremia, treated with Rocephin, discharged with amoxicillin for 10 days. Pt hx with AIDS, on Bactrim for PJP ppx. Pt was septic on admission, febrile (T max 102.7F), hypotensive, tachycardic and tachypnic. Lactate 2.2. PCT ordered. IV ceftriaxone and vancomycin initiated  "empirically.CXR unremarkable.     Allergies:     Peanut-derived, Penicillins, and Hydrocodone-acetaminophen     Pertinent cultures to date:     Results     Procedure Component Value Units Date/Time    MRSA By PCR (Amp) [[id number]]     Order Status: Sent Specimen: Respirate from Nares     COV-2, FLU A/B, AND RSV BY PCR (2-4 HOURS CEPHEID): Collect NP swab in VTM [[id number]] Collected: 05/25/21 0154    Order Status: Completed Specimen: Respirate Updated: 05/25/21 0348     Influenza virus A RNA Negative     Influenza virus B, PCR Negative     RSV, PCR Negative     SARS-CoV-2 by PCR NotDetected     Comment: PATIENTS: Important information regarding your results and instructions can  be found at [email or web address]   \"After your  Covid-19 Test\"    [facility] providers: PLEASE REFER TO DE-ESCALATION AND RETESTING PROTOCOL  on [email or web address]    **The License Acquisitions GeneXpert Xpress SARS-CoV-2 RT-PCR Test has been made  available for use under the Emergency Use Authorization (EUA) only.          SARS-CoV-2 Source NP Swab    Narrative:      Have you been in close contact with a person who is suspected  or known to be positive for COVID-19 within the last 30 days  (e.g. last seen that person < 30 days ago)->No    BLOOD CULTURE [[id number]] Collected: 05/25/21 0159    Order Status: Completed Specimen: Blood from Peripheral Updated: 05/25/21 0258    Narrative:      Per Hospital Policy: Only change Specimen Src: to \"Line\" if  specified by physician order.    BLOOD CULTURE [[id number]] Collected: 05/25/21 0136    Order Status: Completed Specimen: Blood from Peripheral Updated: 05/25/21 0257    Narrative:      Per Hospital Policy: Only change Specimen Src: to \"Line\" if  specified by physician order.    URINALYSIS [[id number]]     Order Status: Sent Specimen: Urine, Clean Catch     URINE CULTURE(NEW) [[id number]]     Order Status: Sent Specimen: Urine, Clean Catch           Labs:     Estimated Creatinine Clearance: " "86.9 mL/min (by C-G formula based on SCr of 1.04 mg/dL).  Recent Labs     21  0136   WBC 5.2   NEUTSPOLYS 68.00     Recent Labs     21  0136   BUN 24*   CREATININE 1.04   ALBUMIN 3.4     No intake or output data in the 24 hours ending 21 0443   /51   Pulse (!) 112   Temp (!) 39.3 °C (102.7 °F) (Temporal)   Resp 15   Ht 1.854 m (6' 1\")   Wt 74.8 kg (165 lb)   SpO2 92%  Temp (24hrs), Av.3 °C (102.7 °F), Min:39.3 °C (102.7 °F), Max:39.3 °C (102.7 °F)      List concerns for Vancomycin clearance:     BUN/Scr ratio greater than 20:1;EVE;Pressors/Hypotension;Hypermetabolic State (SIRS)    AUC kinetics:   Ke (hr ^-1): 0.0765 hr^-1  Half life: 9.06 hr  Clearance: 3.719  Estimated TDD: 1859.5  Estimated Dose: 860  Estimated interval: 11.1    A/P:     -  Vancomycin dose: Vancomycin 1000 mg IV q12hr     -  Next vancomycin level(s): not yet ordered, due ~    -  Predicted vancomycin AUC from initial AUC test calculator: 538 mg·hr/L    -  Comments: Pt with multiple risk factors for renal accumulation. Historical vancomycin dose and level reviewed. MRSA nares ordered. Pharmacy will continue to follow, and recommend de-escalation if appropriate.     Laverne Loomis, PharmD  "

## 2021-05-25 NOTE — DIETARY
"Nutrition services: Day 0 of admit.  Vincent Riojas is a 51 y.o. male with admitting DX of fever of unknown origin  Consult received for AIDS, Diabetes, cancer, MI  · Patient reported he has been losing weight due to being sick, but did not know how much.  · He stated his appetite was fair to good.  He was happy to have snacks between meals BID.    Assessment:  Height: 185.4 cm (6' 1\")  Weight: 74.8 kg (165 lb) based on a stated weight.  Body mass index is 21.77 kg/m²., BMI classification: Normal  Diet/Intake: Consistent carbohydrate, cardiac. 25-50% of Breakfast today + two milks.  At lunch he ate % of spaghetti and also had milk.    Evaluation:   1. In reviewing chart history, patient's weight was 161# in March of 2020.    2. Weights up and down from then, but no recent significant change noted.  3. Large weight loss overall from 2016; approximately 56# or 25% weight loss since then, but that looks to have happened between 2016 and 2018.  4. Pertinent Labs:  Sodium 130, glucose 206, Bun 24, Creatinine 0.96, Glycohemoglobin 11.8    Malnutrition Risk: On nutrition focused physical exam, patient is thin, but not cachectic.  Admit weight not measured.  Malnutrition not noted at this time; need measured weight.    Interventions/Recommendations/Plan:  1. Snacks added  2. Encourage intake of meals and snacks  3. Obtain measured stand up weight  4. Document intake of all meals  as % taken in ADL's to provide interdisciplinary communication across all shifts.   5. Monitor weight trends.  6. Nutrition rep will continue to see patient for ongoing meal and snack preferences.           "

## 2021-05-25 NOTE — ED NOTES
Med Rec complete per pt and pt's 3 pharmacies [facility], [facility], [facility]  Allergies Reviewed    Pt takes BACTRIM DS daily (no known stop date)    Pt Unable to define sliding scale of NOVOLOG

## 2021-05-25 NOTE — ED PROVIDER NOTES
ED Provider Note    Chief Complaint:   Fever, chills    HPI:  Vincent Riojas is a very pleasant 51-year-old gentleman who presents to the emergency department for evaluation of fever and chills.  He was discharged from this facility 15 days ago for similar symptoms.  At that time he had positive blood cultures but no other source of infection found.  About 2 days ago, he developed fever and chills, states his symptoms are identical to when he was previously seen here.  He has no associated chest pain, no shortness of breath, no abdominal pain.  He does have some associated headaches, which he had when he was previously admitted.  He did have a negative MRI, lumbar puncture with negative CSF culture as well at that time.  He was discharged on 10 days of amoxicillin, he completed that course of antibiotics.  States he is not currently on any other antibiotics.  He does have a history of AIDS, and is currently on antiretroviral therapy.  He has had some associated nausea, but no vomiting.  States that his fever and chills have been progressively worsening since time of onset.  He does have an associated headache which is not require headache, he is not having any significant worsening when moving the neck, no significant neck pain, no significant back pain.  He is unable to identify any exacerbating or alleviating factors.    Review of Systems:  See HPI for pertinent positives and negatives. All other systems negative.    Past Medical History:   has a past medical history of AIDS (acquired immune deficiency syndrome) (HCC), Brain tumor (HCC), CAD (coronary artery disease), Cancer (HCC), Depression, Diabetes mellitus, HIV (human immunodeficiency virus infection) (HCC), HIV (human immunodeficiency virus infection) (HCC), Lymphoma (HCC), and Myocardial infarct (HCC).    Social History:  Social History     Tobacco Use   • Smoking status: Current Some Day Smoker     Packs/day: 0.50     Types: Cigarettes     Last  "attempt to quit: 2015     Years since quittin.7   • Smokeless tobacco: Never Used   Vaping Use   • Vaping Use: Every day   Substance and Sexual Activity   • Alcohol use: No     Comment: socially   • Drug use: No     Types: Intravenous     Comment: hx of. currently clean   • Sexual activity: Not on file       Surgical History:  patient denies any surgical history    Current Medications:  Home Medications     Reviewed by Sarah Alford R.N. (Registered Nurse) on 21 at 0128  Med List Status: Partial   Medication Last Dose Status   Alcohol Swabs  Active   Blood Glucose Meter Kit  Active   Blood Glucose Test Strips  Active   insulin glargine (INSULIN GLARGINE) 100 UNIT/ML Solution Pen-injector injection  Active   Insulin Pen Needle 32 G x 4 mm  Active   Lancets  Active   metFORMIN ER (GLUCOPHAGE XR) 750 MG TABLET SR 24 HR  Active                Allergies:  Allergies   Allergen Reactions   • Peanut-Derived Anaphylaxis   • Hydrocodone-Acetaminophen Vomiting   • Penicillins Hives     Patient tolerated Ancef 08/13/15       Physical Exam:  Vital Signs: BP (!) 97/51   Pulse (!) 106   Temp (!) 39.3 °C (102.7 °F) (Temporal)   Resp 16   Ht 1.854 m (6' 1\")   Wt 74.8 kg (165 lb)   SpO2 94%   BMI 21.77 kg/m²   Constitutional: Alert, no acute distress  HENT: Normocephalic, mask in place  Eyes: Pupils equal and reactive, normal conjunctiva  Neck: Supple, normal range of motion, no stridor, no meningeal signs with range of motion of the head  Cardiovascular: Extremities are warm and well perfused, 3/6 holosystolic murmur present, otherwise normal cardiac auscultation  Pulmonary: No respiratory distress, normal work of breathing, no accessory muscule usage, breath sounds clear and equal bilaterally, no crackles, no wheezing  Abdomen: Soft, non-distended, non-tender to palpation, no peritoneal signs  Skin: Warm, dry, no rashes or lesions  Musculoskeletal: Normal range of motion in all extremities, no swelling " "or deformity noted  Neurologic: Alert, oriented, normal speech, normal motor function  Psychiatric: Normal and appropriate mood and affect    Medical records reviewed for continuity of care.  Discharge summary reviewed from 5/10/2021.  He is noted with past medical history significant for HIV, medication nonadherence, diabetes, as well as a history of drug abuse.  He arrived to the emergency department febrile with a white blood count 9.6.  It appears that was treated for sepsis, blood culture resulted 5/6 with gram-positive strep.  He was evaluated by infectious disease specialist, who recommended 2 weeks of amoxicillin 3 times daily.  We will continue Bactrim for PCP prophylaxis.  He was instructed to  HIV medications and antibiotics at outpatient clinic.    Labs:  Labs Reviewed   LACTIC ACID - Abnormal; Notable for the following components:       Result Value    Lactic Acid 2.2 (*)     All other components within normal limits   CBC WITH DIFFERENTIAL - Abnormal; Notable for the following components:    RBC 4.64 (*)     Hemoglobin 12.6 (*)     Hematocrit 37.2 (*)     MCV 80.2 (*)     Lymphocytes 21.90 (*)     All other components within normal limits   COMP METABOLIC PANEL - Abnormal; Notable for the following components:    Sodium 127 (*)     Chloride 90 (*)     Glucose 281 (*)     Bun 24 (*)     Alkaline Phosphatase 239 (*)     Globulin 4.1 (*)     All other components within normal limits   BLOOD CULTURE    Narrative:     Per Hospital Policy: Only change Specimen Src: to \"Line\" if  specified by physician order.   BLOOD CULTURE    Narrative:     Per Hospital Policy: Only change Specimen Src: to \"Line\" if  specified by physician order.   COV-2, FLU A/B, AND RSV BY PCR    Narrative:     Have you been in close contact with a person who is suspected  or known to be positive for COVID-19 within the last 30 days  (e.g. last seen that person < 30 days ago)->No   ESTIMATED GFR   LACTIC ACID   LACTIC ACID "   URINALYSIS   URINE CULTURE(NEW)       Radiology:  DX-CHEST-PORTABLE (1 VIEW)   Final Result      No radiographic evidence of acute cardiopulmonary process.           ED Medications Administered:  Medications   lactated ringers infusion (BOLUS): BMI less than or equal to 30 (has no administration in time range)   vancomycin (VANCOCIN) 1,750 mg in  mL IVPB (1,750 mg Intravenous New Bag 5/25/21 0239)   NS infusion 1,000 mL (1,000 mL Intravenous New Bag 5/25/21 0212)   cefTRIAXone (ROCEPHIN) 2 g in  mL IVPB (0 g Intravenous Stopped 5/25/21 0248)       Differential diagnosis:  Sirs, sepsis, septic endocarditis, bacteremia, pneumonia including COVID-19 pneumonia, pleural effusion    MDM:  Mr. iRojas presents to the emergency department today for evaluation of fever and chills that began about 2 days prior to arrival.  He did recently complete a 10-day course of amoxicillin for treatment of group B strep.  On arrival to the emergency department he is febrile to 102.7 with heart rate of 116.  IV fluid bolus given.  I am concerned for septic endocarditis as he does have a significant cardiac murmur on my physical examination, which is not noted on his admission H&P nor emergency department visit on 5/6/2021.  He did have an echocardiogram performed on 5/8/2021 that demonstrated mild mitral regurgitation and mild tricuspid regurgitation, no vegetations identified.  Ceftriaxone and vancomycin given on arrival out of concern for septic endocarditis after review of prior blood cultures, and discussion with emergency department pharmacist.    On laboratory evaluation he has a normal white blood count, lactic acid is mildly elevated to 2.2.  No bands resulted at this time.  Hemoglobin is 12.6 consistent with prior values. Sodium is low at 127, glucose is 281 consistent with his history of diabetes. COVID-19 testing was sent, that result is pending at this time.    Chest x-ray is negative for acute process.    Plan at  this time is for admission to hospitalist service for treatment of fever with unknown source. Blood cultures were sent and pending at this time. He does not have any meningeal signs on physical exam, he did have a negative CSF culture during his recent admission, doubt meningitis. However, he does have a new cardiac murmur today, with concern for septic endocarditis.    Personal protective equipment including N95 surgical respirator, goggles, and gloves were used during this encounter.       Disposition:  Admit to hospitalist in guarded condition    Final Impression:  1. Fever, unspecified fever cause    2. Chills    3. Sepsis, due to unspecified organism, unspecified whether acute organ dysfunction present (HCC)    4. Cardiac murmur    5. History of bacteremia        Electronically signed by: Joyce Badillo MD, 5/25/2021 2:59 AM

## 2021-05-25 NOTE — H&P
Hospital Medicine History & Physical Note    Date of Service  5/25/2021    Primary Care Physician  Miladys Sanchez P.A.-C.    Consultants  None    Code Status  Full Code    Chief Complaint  Chief Complaint   Patient presents with   • Fever       History of Presenting Illness  51 y.o. male with AIDS, diabetes, history of MI, history of brain tumor.  Who presented 5/25/2021 with complaints of persistent fever.  Patient was recently discharged from [facility] 15 days prior evaluation of similar complaints.  At that time he was found to have positive blood cultures with group B strep.  LP with CSF was negative for infection.  He was treated with antibiotics discharged on a 10-day course of amoxicillin which he reports he has completed without issue.  He however reports that for the past few days, his fevers or chills has returned and he has overall sensation of malaise.  He reports associated headache but denies any shortness of breath, urinary/GI complaints.  He does report a 6/10 in intensity substernal chest pain that is nonradiating and started to occur while he was in the ED today.  His symptoms lasted for about 20 minutes and have now resolved.  He denies further complaints at this time.  ED:    ED: Temperature 102.7, -106, RR 22-31-16, /64-97/51.  Hb 12.6, , CL 90, BUN 24, glucose 2881, alk phos 239, lactic acid 2.2.  CXR unremarkable.  Patient was given LR 30 cc/kg, NS 1 L, and Vanco/Rocephin.      Review of Systems  Review of Systems   Constitutional: Positive for chills, fever and malaise/fatigue. Negative for weight loss.   HENT: Negative for hearing loss and sore throat.    Eyes: Negative for blurred vision and pain.   Respiratory: Negative for cough, hemoptysis, shortness of breath and wheezing.    Cardiovascular: Positive for chest pain. Negative for palpitations, orthopnea and leg swelling.   Gastrointestinal: Negative for abdominal pain, blood in stool, constipation, diarrhea, nausea and  vomiting.   Genitourinary: Negative for dysuria and hematuria.   Musculoskeletal: Negative for joint pain and myalgias.   Skin: Negative for rash.   Neurological: Positive for weakness (Diffuse). Negative for dizziness and loss of consciousness.       Past Medical History   has a past medical history of AIDS (acquired immune deficiency syndrome) (HCC), Brain tumor (HCC), CAD (coronary artery disease), Cancer (HCC), Depression, Diabetes mellitus, HIV (human immunodeficiency virus infection) (HCC), HIV (human immunodeficiency virus infection) (HCC), Lymphoma (HCC), and Myocardial infarct (HCC).    Surgical History  No prior surgical history    Family History  No pertinent family history    Social History   reports that he has been smoking cigarettes. He has been smoking about 0.50 packs per day. He has never used smokeless tobacco. He reports that he does not drink alcohol and does not use drugs.    Allergies  Allergies   Allergen Reactions   • Peanut-Derived Anaphylaxis   • Hydrocodone-Acetaminophen Vomiting   • Penicillins Hives     Patient tolerated Anc 08/13/15       Medications  Prior to Admission Medications   Prescriptions Last Dose Informant Patient Reported? Taking?   Alcohol Swabs  Patient No No   Sig: Wipe site with prep pad prior to injection.   Patient not taking: Reported on 11/26/2020   Blood Glucose Meter Kit  Patient No No   Sig: Test blood sugar as recommended by provider. True Metrix blood glucose monitoring kit.   Patient not taking: Reported on 11/26/2020   Blood Glucose Test Strips  Patient No No   Sig: Use one True Metrix strip to test blood sugar twice daily before meals.   Patient not taking: Reported on 11/26/2020   Insulin Pen Needle 32 G x 4 mm  Patient No No   Sig: Use one pen needle in pen device to inject insulin once daily.   Patient not taking: Reported on 11/26/2020   Lancets  Patient No No   Sig: Use one True Metrix lancet to test blood sugar twice daily before meals.   Patient not  taking: Reported on 11/26/2020   insulin glargine (INSULIN GLARGINE) 100 UNIT/ML Solution Pen-injector injection   No No   Sig: Inject 15 Units under the skin every evening.   metFORMIN ER (GLUCOPHAGE XR) 750 MG TABLET SR 24 HR   No No   Sig: Take 1 tablet by mouth every night with dinner.      Facility-Administered Medications: None       Physical Exam  Temp:  [39.3 °C (102.7 °F)] 39.3 °C (102.7 °F)  Pulse:  [106-119] 110  Resp:  [16-31] 20  BP: ()/(51-64) 99/55  SpO2:  [94 %-96 %] 96 %    Physical Exam  Vitals and nursing note reviewed.   Constitutional:       General: He is not in acute distress.     Appearance: Normal appearance.      Comments: Thin   HENT:      Mouth/Throat:      Mouth: Mucous membranes are moist.   Eyes:      Extraocular Movements: Extraocular movements intact.   Cardiovascular:      Rate and Rhythm: Normal rate and regular rhythm.      Heart sounds: Murmur (Systolic ejection murmur) heard.   No gallop.    Pulmonary:      Effort: Pulmonary effort is normal.      Breath sounds: Normal breath sounds. No wheezing, rhonchi or rales.   Abdominal:      General: Abdomen is flat. Bowel sounds are normal. There is no distension.      Palpations: Abdomen is soft.      Tenderness: There is no abdominal tenderness.   Musculoskeletal:         General: No deformity. Normal range of motion.      Right lower leg: No edema.      Left lower leg: No edema.   Neurological:      General: No focal deficit present.      Mental Status: He is alert and oriented to person, place, and time.         Laboratory:  Recent Labs     05/25/21 0136   WBC 5.2   RBC 4.64*   HEMOGLOBIN 12.6*   HEMATOCRIT 37.2*   MCV 80.2*   MCH 27.2   MCHC 33.9   RDW 37.2   PLATELETCT 179   MPV 10.8     Recent Labs     05/25/21 0136   SODIUM 127*   POTASSIUM 3.9   CHLORIDE 90*   CO2 26   GLUCOSE 281*   BUN 24*   CREATININE 1.04   CALCIUM 8.7     Recent Labs     05/25/21 0136   ALTSGPT 31   ASTSGOT 18   ALKPHOSPHAT 239*   TBILIRUBIN 0.9    GLUCOSE 281*         No results for input(s): NTPROBNP in the last 72 hours.      No results for input(s): TROPONINT in the last 72 hours.    Imaging:  DX-CHEST-PORTABLE (1 VIEW)   Final Result      No radiographic evidence of acute cardiopulmonary process.            Assessment/Plan:  I anticipate this patient will require at least two midnights for appropriate medical management, necessitating inpatient admission.    * Sepsis (HCC)- (present on admission)  Assessment & Plan  This is Sepsis Present on admission  SIRS criteria identified on my evaluation include: Fever, with temperature greater than 101 deg F, Tachycardia, with heart rate greater than 90 BPM and Tachypnea, with respirations greater than 20 per minute  Source is unknown origin  Suspected onset of infection (date and time) 5/23/21  Sepsis protocol initiated  Fluid resuscitation ordered per protocol  IV antibiotics as appropriate for source of sepsis  While organ dysfunction may be noted elsewhere in this problem list or in the chart, degree of organ dysfunction does not meet CMS criteria for severe sepsis    Recently admitted to [facility] for similar complaints  Found to have group B strep  Treated with antibiotics and discharged on 10-day course of amoxicillin  Echocardiogram performed at that admission unremarkable  Now presenting with persistent symptoms, also with murmur not previously documented on prior visit  Concern for possible vegetations  S/p LR 30 cc/kg, NS 1 L, Vanco/Rocephin  -Continue with vancomycin/Rocephin  -Follow-up blood cultures and urine analysis  -Tylenol for symptom management  -Continue with IV fluids for BP support  -Consider ID consult for recurrent fevers.  Based on clinical course, may benefit from TTE or MATHEW        Fever, unknown origin- (present on admission)  Assessment & Plan  Plan as above    Hyponatremia- (present on admission)  Assessment & Plan  - c/w NS 125cc/hr  - f/u with repeat BMP  - Correction goal 6-8 in  24hrs  - F/u with Serum Osm, Urine Osm, Urine Na      Lactic acidosis- (present on admission)  Assessment & Plan  Lactic acid 2.2  -Trend lactic acids  -Continue with antibiotics  -Continue with IV fluids    Hyperglycemia- (present on admission)  Assessment & Plan  Glucose 281  -A1c  -ISS    VTE: SCD, Lovenox    DM (diabetes mellitus) (HCC)- (present on admission)  Assessment & Plan  Glucose 281  Reports has been compliant with medications  -A1c  -ISS  -C/W home glargine 15 units nightly  -Holding home Metformin

## 2021-05-25 NOTE — PROGRESS NOTES
2 RN skin check complete.   Devices in place : N/A.  Skin assessed under devices: N/A.  Confirmed pressure ulcers found on: N/A.  New potential pressure ulcers noted on: N/A. Wound consult placed: N/A.  The following interventions in place: pillows in place for positioning.

## 2021-05-25 NOTE — ED TRIAGE NOTES
Vincent Say Riojas  51 y.o. male  Chief Complaint   Patient presents with   • Fever     Pt was discharged 2 weeks ago after being hospitalized for sepsis, pt does not know infectious origin. Pt was sent home with antibiotics, and 4-5 days ago his symptoms started to worsen. Pt reports chills, and fever. Tachycardic and tachypneic, sepsis 4. Sepsis protocol ordered. PIV established, labs drawn and sent. First BC sent.    Vitals:    05/25/21 0115   BP: 126/64   Pulse: (!) 119   Resp: (!) 22   Temp: (!) 39.3 °C (102.7 °F)   SpO2: 96%        Pt immediately roomed.

## 2021-05-25 NOTE — ASSESSMENT & PLAN NOTE
Lab Results   Component Value Date/Time    HBA1C 11.8 (H) 05/25/2021 1049    HBA1C 13.4 (H) 03/10/2020 0238    HBA1C 7.5 (H) 02/10/2015 1333     Results from last 7 days   Lab Units 05/27/21  0600 05/26/21  1955 05/26/21  1710 05/26/21  0601 05/25/21  2045 05/25/21  1726 05/25/21  1150 05/25/21  0635   ACCU CHECK GLUCOSE 788 mg/dL 185* 201* 231* 178* 184* 275* 204* 315*     I have ordered insulin sliding scale with D50 and glucagon for hypoglycemia per protocol.  Diabetic diet  Diabetic education

## 2021-05-25 NOTE — CARE PLAN
Problem: Knowledge Deficit - Standard  Goal: Patient and family/care givers will demonstrate understanding of plan of care, disease process/condition, diagnostic tests and medications  Outcome: Progressing  Note: Patient is alert and oriented to person, place, time, and situation. Pt is aware of plan of care and all questions have been answered at this time. Will continue to monitor     Problem: Respiratory  Goal: Patient will achieve/maintain optimum respiratory ventilation and gas exchange  Outcome: Progressing  Note: Pt has unlabored breathing on room air and has had no complaints of shortness of breath   The patient is Watcher - Medium risk of patient condition declining or worsening

## 2021-05-25 NOTE — ED NOTES
Med rec partially completed per patient.    Patient unable to name all of his medications.     Reports:  Long Acting Insulin-  Short Acting Insulin- unknown SS  Atorvastain-  Biktarvy-  Bactrium- from his infectious disease (Prophylactic)    Will need to call home pharmacy to verify medication strengths. Patient thinks he might take a couple more but isn't positive. Reports since his discharge he restarted his medications and has been taking them as prescribed.

## 2021-05-26 PROBLEM — R73.9 HYPERGLYCEMIA: Status: RESOLVED | Noted: 2021-01-01 | Resolved: 2021-01-01

## 2021-05-26 PROBLEM — A40.1: Status: ACTIVE | Noted: 2021-01-01

## 2021-05-26 PROBLEM — R65.20: Status: ACTIVE | Noted: 2021-01-01

## 2021-05-26 PROBLEM — I95.9 HYPOTENSION: Status: ACTIVE | Noted: 2021-01-01

## 2021-05-26 PROBLEM — R50.9 FEVER, UNKNOWN ORIGIN: Status: RESOLVED | Noted: 2020-03-10 | Resolved: 2021-01-01

## 2021-05-26 PROBLEM — J96.01: Status: ACTIVE | Noted: 2021-01-01

## 2021-05-26 NOTE — PROGRESS NOTES
Hospital Medicine Daily Progress Note    Date of Service  5/26/2021    Chief Complaint  51 y.o. male admitted 5/25/2021 with fevers    Hospital Course  Mr. Vincent Riojas is a 51 y.o. male with history of AIDS, diabetes, CAD with MI, brain tumor who presented on 5/25/2021 with fever.  Recently admitted to [facility] for fever 15 days prior and found to have Strep agalactiae bacteremia.  He was discharged on a 10 day course of Amoxicillin which he reports completing without issue.  After completing course he again developed fever, chills, malaise.  The patient was admitted with sepsis secondary to recurrent strep bacteremia, lactic acidosis and started on Ceftriaxone.  Infectious disease was consulted Dr. Velazquez.  Tolerated transesophageal echo on 5/26.      Interval Problem Update  Patient was seen and examined at bedside.  I have personally reviewed and interpreted vitals, labs, and imaging.    5/25.  Patient having persistent fevers.  Answers most questions with [facility].  He is somnolent this morning.  Blood cultures already positive for gram-positive cocci and he confirms completed antibiotics as outpatient.  I did reach out to cardiology about MATHEW to rule out vegetations.  I also reached out to infectious disease.  5/26.  Patient with persistent fevers with temperature of 38.2 last night.  Tachycardia is improved.  Has had soft blood pressures.  Lactic acid 2.1.  On room air.  Replete Phos, mag.  Hyponatremia at 129.  Continue IV fluids.  Continue with fluid boluses and midodrine for blood pressure support.  Plan for MATHEW this morning.  Denies fevers, chills, shortness of breath.  He does report some chest pain which is very nonspecific and reproducible on palpation.  States it has been going on for weeks and not related to exertion.  Denies trauma.  He does report some dizziness when he first gets up.  This afternoon he did start complaining of more shortness of breath requiring supplemental oxygen.  He  does have rales on exam.  No significant edema.  Has required significant fluids and midodrine for septic shock.  Encourage incentive spirometer.  Evaluate with chest x-ray, BNP.  Repeat blood cultures pending.    Consultants/Specialty  ID - Dr. Velazquez  Cards    Code Status  Full Code    Disposition  Medical clearance    Review of Systems  Review of Systems   Constitutional: Negative for chills and fever.   HENT: Negative for congestion and sore throat.    Eyes: Negative for blurred vision.   Respiratory: Negative for cough and shortness of breath.    Cardiovascular: Positive for chest pain. Negative for palpitations and leg swelling.   Gastrointestinal: Negative for abdominal pain, constipation, diarrhea, nausea and vomiting.   Genitourinary: Negative for dysuria, frequency and urgency.   Musculoskeletal: Negative for falls.   Skin: Negative for rash.   Neurological: Positive for dizziness. Negative for weakness and headaches.   Psychiatric/Behavioral: Negative for depression. The patient is not nervous/anxious.    All other systems reviewed and are negative.       Physical Exam  Temp:  [36.8 °C (98.3 °F)-38.2 °C (100.8 °F)] 37.3 °C (99.1 °F)  Pulse:  [] 100  Resp:  [18] 18  BP: ()/(40-50) 99/46  SpO2:  [92 %-97 %] 95 %    Physical Exam  Vitals and nursing note reviewed.   Constitutional:       General: He is not in acute distress.     Appearance: Normal appearance. He is normal weight.   HENT:      Head: Normocephalic and atraumatic.      Right Ear: External ear normal.      Left Ear: External ear normal.      Nose: Nose normal.      Mouth/Throat:      Mouth: Mucous membranes are moist.      Pharynx: Oropharynx is clear.   Eyes:      Extraocular Movements: Extraocular movements intact.      Conjunctiva/sclera: Conjunctivae normal.   Cardiovascular:      Rate and Rhythm: Regular rhythm. Tachycardia present.      Pulses: Normal pulses.      Heart sounds: Murmur heard.   No friction rub. No gallop.     Pulmonary:      Effort: Pulmonary effort is normal. No respiratory distress.      Breath sounds: Rales present. No wheezing.   Chest:      Chest wall: Tenderness (reproducible on palpation) present.   Abdominal:      General: Abdomen is flat. Bowel sounds are normal. There is no distension.      Palpations: Abdomen is soft. There is no mass.      Tenderness: There is no abdominal tenderness. There is no guarding.   Musculoskeletal:         General: Normal range of motion.      Cervical back: Normal range of motion.      Right lower leg: No edema.      Left lower leg: No edema.   Skin:     General: Skin is warm.      Capillary Refill: Capillary refill takes less than 2 seconds.   Neurological:      General: No focal deficit present.      Mental Status: He is alert and oriented to person, place, and time. Mental status is at baseline.      Cranial Nerves: No cranial nerve deficit.      Motor: No weakness.   Psychiatric:         Mood and Affect: Mood normal.         Behavior: Behavior normal.         Fluids    Intake/Output Summary (Last 24 hours) at 5/26/2021 0535  Last data filed at 5/25/2021 1020  Gross per 24 hour   Intake 500 ml   Output --   Net 500 ml       Laboratory  Recent Labs     05/25/21  0136 05/25/21  1049 05/26/21  0029   WBC 5.2 4.5* 6.4   RBC 4.64* 4.32* 4.40*   HEMOGLOBIN 12.6* 11.8* 12.0*   HEMATOCRIT 37.2* 35.2* 35.9*   MCV 80.2* 81.5 81.6   MCH 27.2 27.3 27.3   MCHC 33.9 33.5* 33.4*   RDW 37.2 38.8 39.1   PLATELETCT 179 132* 157*   MPV 10.8 10.5 10.9     Recent Labs     05/25/21  0405 05/25/21  1049 05/26/21  0029   SODIUM 129* 130* 129*   POTASSIUM 4.1 3.9 4.3   CHLORIDE 97 99 99   CO2 24 20 19*   GLUCOSE 245* 206* 193*   BUN 23* 24* 25*   CREATININE 0.87 0.96 1.15   CALCIUM 8.0* 8.1* 8.3*     Recent Labs     05/25/21  0405   INR 1.21*               Imaging  DX-CHEST-PORTABLE (1 VIEW)   Final Result      No radiographic evidence of acute cardiopulmonary process.      EC-MATHEW W/O CONT    (Results  Pending)        Assessment/Plan  * Sepsis (HCC)- (present on admission)  Assessment & Plan  This is Sepsis Present on admission  SIRS criteria identified on my evaluation include: Fever, with temperature greater than 101 deg F, Tachycardia, with heart rate greater than 90 BPM and Tachypnea, with respirations greater than 20 per minute  Source is gram-positive cocci bacteremia  Suspected onset of infection (date and time) 5/23/21  Sepsis protocol initiated  Fluid resuscitation ordered per protocol  IV antibiotics as appropriate for source of sepsis  While organ dysfunction may be noted elsewhere in this problem list or in the chart, degree of organ dysfunction does not meet CMS criteria for severe sepsis    Recently admitted to [facility] for similar complaints  Found to have group B strep bacteremia  Treated with antibiotics and discharged on 10-day course of amoxicillin  Echocardiogram performed at that admission unremarkable  Now presenting with persistent symptoms, also with murmur not previously documented on prior visit  Concern for possible vegetations  S/p LR 30 cc/kg, NS 1 L, Vanco/Rocephin  -Continue with Rocephin  -Follow-up blood cultures and urinalysis  -Tylenol for symptom management  -Continue with IV fluids for BP support  Discussed with ID Dr. Velazquez.  Tolerated MATHEW on 5/26.    Hypotension  Assessment & Plan  Secondary to septic shock  Maintenance fluids, fluid boluses, and midodrine.  Patient does report dizziness/lightheadedness.  Treat sepsis as above   Trend lactic acid    Sepsis due to group B Streptococcus with acute hypoxic respiratory failure (HCC)  Assessment & Plan  This is Severe Sepsis Present on admission  SIRS criteria identified on my evaluation include: Fever, with temperature greater than 101 deg F, Tachycardia, with heart rate greater than 90 BPM and Tachypnea, with respirations greater than 20 per minute  Source is bacteremia, endocarditis  Clinical indicators of end organ dysfunction  include Systolic blood pressure (SBP) <90 mmHg or mean arterial pressure <65 mmHg  Sepsis protocol initiated  Fluid resuscitation ordered per protocol  IV antibiotics as appropriate for source of sepsis  Reassessment: I have reassessed the patient's hemodynamic status  End organ dysfunction include(s):  Acute respiratory failure    Hypoxia respiratory failure in the setting of sepsis.  Concern for fluid overload.  Evaluate with chest x-ray, BNP.    Lactic acidosis- (present on admission)  Assessment & Plan  Lactic acid 2.2  -Trend lactic acids  -Continue with antibiotics  -Continue with IV fluids    Hyponatremia- (present on admission)  Assessment & Plan  Continue normal saline IV fluids  Trend with BMP    DM (diabetes mellitus) (HCC)- (present on admission)  Assessment & Plan  Lab Results   Component Value Date/Time    HBA1C 11.8 (H) 05/25/2021 1049    HBA1C 13.4 (H) 03/10/2020 0238    HBA1C 7.5 (H) 02/10/2015 1333     Results from last 7 days   Lab Units 05/26/21  0601 05/25/21  2045 05/25/21  1726 05/25/21  1150 05/25/21  0635   ACCU CHECK GLUCOSE 788 mg/dL 178* 184* 275* 204* 315*     I have ordered insulin sliding scale with D50 and glucagon for hypoglycemia per protocol.  Diabetic diet  Diabetic education       VTE prophylaxis: Enoxaparin

## 2021-05-26 NOTE — CARE PLAN
The patient is Watcher - Medium risk of patient condition declining or worsening    Clinical goals: Keep pt's fever below 100.5  Pt goals: To get some sleep       Progress made toward(s) clinical / shift goals: Pt's fever was 100.6 at change of shift, administered PRN tylenol. Q4 vitals in place. Patient has been successful in sleeping this evening.    Patient is not progressing towards the following goals:    Problem: Respiratory  Goal: Patient will achieve/maintain optimum respiratory ventilation and gas exchange  5/26/2021 0007 by Armand Baltazar RCarolinaN.  Outcome: Not Progressing  (Patient's O2 saturations are fine, but he does exhibit shallow breathing with slight tachypnea)     Problem: Physical Regulation  Goal: Signs and symptoms of infection will decrease  5/26/2021 0007 by Armand Baltazar, R.N.  Outcome: Not Progressing  (Patient does exhibit S/S of infection, increased respirations, tachypnea and fever. Will continue to administer antibiotic regimen, blood cultures being drawn this AM)

## 2021-05-26 NOTE — CARE PLAN
Problem: Respiratory  Goal: Patient will achieve/maintain optimum respiratory ventilation and gas exchange  Outcome: Not Progressing  Flowsheets  Taken 5/26/2021 1126 by Giovanni Becker R.N.  Incentive Spirometer: Needs Assistance  Taken 5/26/2021 1115 by Negra Lobo R.N.  O2 Delivery Device: Nasal Cannula  Taken 5/26/2021 0730 by Giovanni Becker R.N.  Deep Breathe and Cough: Needs Coaching  Note: Pt was SpO2 was in the low 80s this morning, applied O2 via nasal canula at 3L to help maintain SpO2 at 90%     Problem: Knowledge Deficit - Standard  Goal: Patient and family/care givers will demonstrate understanding of plan of care, disease process/condition, diagnostic tests and medications  Outcome: Progressing  Note: Pt is aware of plan of care and all questions have been answered at this time; will continue to monitor.   The patient is Watcher - Medium risk of patient condition declining or worsening

## 2021-05-26 NOTE — ASSESSMENT & PLAN NOTE
Secondary to septic shock  Maintenance fluids, fluid boluses, and midodrine.    Increased midodrine.  Patient now being diuresed  Patient does report dizziness/lightheadedness.  Treat sepsis as above   Trend lactic acid  Repeat echocardiogram pending.  Trend lactic acid.  Appears to be perfusing well on exam  He is high risk of decompensation and intensivist is following

## 2021-05-26 NOTE — OR NURSING
"Pt. verbalized\"some sore throat\"from the procedure.Took some ice chips.Occasional loose prod. cough-suctioned self as needed.  BP's  as per baseline with MAP>60,pt.remains assymptomatic.  "

## 2021-05-26 NOTE — ASSESSMENT & PLAN NOTE
This is Severe Sepsis Present on admission  SIRS criteria identified on my evaluation include: Fever, with temperature greater than 101 deg F, Tachycardia, with heart rate greater than 90 BPM and Tachypnea, with respirations greater than 20 per minute  Source is bacteremia, endocarditis  Clinical indicators of end organ dysfunction include Systolic blood pressure (SBP) <90 mmHg or mean arterial pressure <65 mmHg  Sepsis protocol initiated  Fluid resuscitation ordered per protocol  IV antibiotics as appropriate for source of sepsis  Reassessment: I have reassessed the patient's hemodynamic status  End organ dysfunction include(s):  Acute respiratory failure    Hypoxia respiratory failure in the setting of sepsis.  Concern for fluid overload.  Evaluate with chest x-ray, BNP.  Chest x-ray does show pulmonary edema.  Elevated BNP  Started on IV diuresis

## 2021-05-27 PROBLEM — I50.21 ACUTE SYSTOLIC HEART FAILURE (HCC): Status: ACTIVE | Noted: 2021-01-01

## 2021-05-27 PROBLEM — I35.1 NONRHEUMATIC AORTIC VALVE INSUFFICIENCY: Status: ACTIVE | Noted: 2021-01-01

## 2021-05-27 PROBLEM — I35.8 AORTIC VALVE ENDOCARDITIS: Status: ACTIVE | Noted: 2021-01-01

## 2021-05-27 NOTE — PROGRESS NOTES
Patient's  alarming with oxygen saturations at 86%. Patient did not want to initially want to reposition for better breathing, but after education agreed/verbalized understanding. Repositioned in bed. Oxygen saturations 90-94% on RA with HOB at 45 degrees.

## 2021-05-27 NOTE — PROGRESS NOTES
Assumed care of patient at 0700. Received report from night shift RN. Patient is alert/orientated x4. Complains of 8/10 generalized aching constant pain - prn po 5mg oxy given per MAR. Denies SOB, chest pain, dizziness, and nausea. Provider notified of elevated pro BNP, elevated lactic acid, and estimated EF from MATHEW. New order to reduce IVF to 100ml/hr. Lung sounds clear. No edema noted at this time. No other complaints brought forward to this RN at this time. Call light within reach. Hourly rounds. Bed locked in lowest position. Will continue to implement plan of care.

## 2021-05-27 NOTE — DISCHARGE SUMMARY
Death Summary    Cause of Death  Cardiac arrest due to cardiogenic shock due to aortic regurgitation due to infective endocarditis    Contributing conditions: AIDS, DM, chronic meth abuse    Comorbid Conditions at the Time of Death  Principal Problem:    Sepsis (HCC) POA: Yes  Active Problems:    DM (diabetes mellitus) (HCC) POA: Yes    Hyponatremia POA: Yes    Lactic acidosis POA: Yes    Hypotension POA: Unknown    Sepsis due to group B Streptococcus with acute hypoxic respiratory failure (HCC) POA: Unknown    Nonrheumatic aortic valve insufficiency POA: Yes    Aortic valve endocarditis POA: Yes    Acute systolic heart failure (HCC) POA: Yes  Resolved Problems:    CAD (coronary artery disease) POA: Yes    Fever, unknown origin POA: Yes    Hyperglycemia POA: Yes      History of Presenting Illness and Hospital Course  This is a 51 y.o. male with history of AIDS, diabetes, CAD with MI, brain tumor who presented on 5/25/2021 with fever, found to have infective endocarditis on the aortic valve resulting in AR.      Recently admitted to [facility] for fever 15 days prior and found to have Strep agalactiae bacteremia.  He was discharged on a 10 day course of Amoxicillin which he reports completing without issue, however in speaking with the coordinator for appointments at the [facility] he never filled his prescriptions.    The patient was admitted with sepsis secondary to recurrent strep bacteremia, lactic acidosis and started on Ceftriaxone.  Infectious disease was consulted Dr. Velazquez.  Tolerated transesophageal echo on 5/26 which showed aortic valve vegetation and regurgitation, anteroseptal hypokinesis, EF 35%, which is decreased from TTE EF 70% just 2 weeks prior    5/27 critical care was consulted, he was evaluated on the floor, lactic acid was 2, and recommendations were made including repeat TTE. His blood pressure dropped below SBP < 90 so he was promptly moved to the ICU. Upon arrival to the ICU he was awake and  alert, SBP > 90, satting well on oxymask. He began complaining of nausea and anxiety with regard to the arterial line. Prior to the arterial line placement he became unresponsive with SBP in the 50s. NE was started immediately, CaCl, bicarb delivered while simultaneously reassessing him he became pulseless.     Despite ACLS he remained in PEA and was pronounced dead at 15:18. Moment of silence held, unable to reach family at this time.      Time of death: 15:18  Pronounced by: Sebas Merchant MD

## 2021-05-27 NOTE — DISCHARGE PLANNING
LSW responded to CODE BLUE event. Chest compressions were started by medical team, LSW attempted to contact family near bedside. LSW attempted to contact mother, Maren during the event but was unable to get in contact with her, voicemail was not set up and unable to leave voicemail. LSW attempted to talk to S/O Dilan throughout the event but was unable to, able to leave voicemail . LSW attempted to contact pt's listed phone number in hopes family would have it. Unable to reach family after many attempts. Dr. Lan actively contacting family as well during the event. Pt was pronounced, treatment team is aware that family is currently unaware of pt's passing, provider continues to actively contact NOK. Spiritual care present.     LSW will be available in case needs arise. LSW gave hand off to ED in case after hours support is needed.

## 2021-05-27 NOTE — PROGRESS NOTES
Hospital Medicine Daily Progress Note    Date of Service  5/27/2021    Chief Complaint  51 y.o. male admitted 5/25/2021 with fevers    Hospital Course  Mr. Vincent Riojas is a 51 y.o. male with history of AIDS, diabetes, CAD with MI, brain tumor who presented on 5/25/2021 with fever.  Recently admitted to [facility] for fever 15 days prior and found to have Strep agalactiae bacteremia.  He was discharged on a 10 day course of Amoxicillin which he reports completing without issue.  After completing course he again developed fever, chills, malaise.  The patient was admitted with sepsis secondary to recurrent strep bacteremia, lactic acidosis and started on Ceftriaxone.  Infectious disease was consulted Dr. Velazquez.  Tolerated transesophageal echo on 5/26 which showed aortic valve vegetation and regurgitation, anteroseptal hypokinesis, EF 35%, which is decreased from TTE EF 70% just 2 weeks prior.  Cardiology and cardiothoracic surgery were consulted.  The patient has soft blood pressure secondary to sepsis versus acute aortic regurgitation.  He did require IV fluids and midodrine for blood pressure support.  With MATHEW showing acute aortic regurgitation fluids were stopped and patient was being diuresed.      Interval Problem Update  Patient was seen and examined at bedside.  I have personally reviewed and interpreted vitals, labs, and imaging.    5/25.  Patient having persistent fevers.  Answers most questions with mmm-[facility], [facility].  He is somnolent this morning.  Blood cultures already positive for gram-positive cocci and he confirms completed antibiotics as outpatient.  I did reach out to cardiology about MATHEW to rule out vegetations.  I also reached out to infectious disease.  5/26.  Patient with persistent fevers with temperature of 38.2 last night.  Tachycardia is improved.  Has had soft blood pressures.  Lactic acid 2.1.  On room air.  Replete Phos, mag.  Hyponatremia at 129.  Continue IV fluids.  Continue with  fluid boluses and midodrine for blood pressure support.  Plan for MATHEW this morning.  Denies fevers, chills, shortness of breath.  He does report some chest pain which is very nonspecific and reproducible on palpation.  States it has been going on for weeks and not related to exertion.  Denies trauma.  He does report some dizziness when he first gets up.  This afternoon he did start complaining of more shortness of breath requiring supplemental oxygen.  He does have rales on exam.  No significant edema.  Has required significant fluids and midodrine for septic shock.  Encourage incentive spirometer.  Evaluate with chest x-ray, BNP.  Repeat blood cultures pending.  5/27.  Fever has defervesce.  Has been tachycardic and hypotensive.  On 0-3L NC.  CXR concerning for fluid overload.  MATHEW reviewed.  Consulted cardiology, cardiothoracic surgery.  Discussed with intensivist as patient has had soft blood pressures secondary to sepsis versus acute aortic regurgitation.  Start diuresis and hold off on IV fluid for now.  Increased dose of midodrine.  Follow-up on repeat TTE.  He does complain of atypical chest pain intermittently.  Reviewed EKG this morning.  Follow-up on echocardiogram.  Chest pain is reproducible on palpation.    Consultants/Specialty  ID - Dr. Marcus Oleary  Cardiothoracic surgery  Intensivist    Code Status  Full Code    Disposition  Medical clearance    Review of Systems  Review of Systems   Constitutional: Negative for chills and fever.   HENT: Negative for congestion and sore throat.    Eyes: Negative for blurred vision.   Respiratory: Negative for cough and shortness of breath.    Cardiovascular: Positive for chest pain. Negative for palpitations and leg swelling.   Gastrointestinal: Negative for abdominal pain, constipation, diarrhea, nausea and vomiting.   Genitourinary: Negative for dysuria, frequency and urgency.   Musculoskeletal: Negative for falls.   Skin: Negative for rash.   Neurological:  Positive for dizziness. Negative for weakness and headaches.   Psychiatric/Behavioral: Negative for depression. The patient is not nervous/anxious.    All other systems reviewed and are negative.       Physical Exam  Temp:  [36.6 °C (97.9 °F)-37.9 °C (100.3 °F)] 36.8 °C (98.2 °F)  Pulse:  [] 100  Resp:  [18-25] 19  BP: ()/(42-63) 98/50  SpO2:  [89 %-99 %] 91 %    Physical Exam  Vitals and nursing note reviewed.   Constitutional:       General: He is not in acute distress.     Appearance: Normal appearance. He is normal weight.   HENT:      Head: Normocephalic and atraumatic.      Right Ear: External ear normal.      Left Ear: External ear normal.      Nose: Nose normal.      Mouth/Throat:      Mouth: Mucous membranes are moist.      Pharynx: Oropharynx is clear.   Eyes:      Extraocular Movements: Extraocular movements intact.      Conjunctiva/sclera: Conjunctivae normal.   Cardiovascular:      Rate and Rhythm: Regular rhythm. Tachycardia present.      Pulses: Normal pulses.      Heart sounds: Murmur heard.   No friction rub. No gallop.    Pulmonary:      Effort: Pulmonary effort is normal. No respiratory distress.      Breath sounds: Rales present. No wheezing.   Chest:      Chest wall: Tenderness (reproducible on palpation) present.   Abdominal:      General: Abdomen is flat. Bowel sounds are normal. There is no distension.      Palpations: Abdomen is soft. There is no mass.      Tenderness: There is no abdominal tenderness. There is no guarding.   Musculoskeletal:         General: Normal range of motion.      Cervical back: Normal range of motion.      Right lower leg: No edema.      Left lower leg: No edema.   Skin:     General: Skin is warm.      Capillary Refill: Capillary refill takes less than 2 seconds.   Neurological:      General: No focal deficit present.      Mental Status: He is alert and oriented to person, place, and time. Mental status is at baseline.      Cranial Nerves: No cranial  nerve deficit.      Motor: No weakness.   Psychiatric:         Mood and Affect: Mood normal.         Behavior: Behavior normal.         Fluids    Intake/Output Summary (Last 24 hours) at 5/27/2021 0536  Last data filed at 5/26/2021 1400  Gross per 24 hour   Intake 800 ml   Output --   Net 800 ml       Laboratory  Recent Labs     05/25/21  1049 05/26/21  0029 05/27/21  0025   WBC 4.5* 6.4 7.3   RBC 4.32* 4.40* 4.67*   HEMOGLOBIN 11.8* 12.0* 12.5*   HEMATOCRIT 35.2* 35.9* 38.1*   MCV 81.5 81.6 81.6   MCH 27.3 27.3 26.8*   MCHC 33.5* 33.4* 32.8*   RDW 38.8 39.1 40.4   PLATELETCT 132* 157* 180   MPV 10.5 10.9 12.2     Recent Labs     05/25/21  1049 05/26/21  0029 05/27/21  0025   SODIUM 130* 129* 131*   POTASSIUM 3.9 4.3 4.2   CHLORIDE 99 99 99   CO2 20 19* 20   GLUCOSE 206* 193* 160*   BUN 24* 25* 26*   CREATININE 0.96 1.15 1.27   CALCIUM 8.1* 8.3* 8.4*     Recent Labs     05/25/21  0405   INR 1.21*               Imaging  DX-CHEST-2 VIEWS   Final Result         New extensive airspace opacity throughout both lungs could relate to developing atypical infection or moderate pulmonary edema.      EC-MATHEW W/O CONT   Final Result      DX-CHEST-PORTABLE (1 VIEW)   Final Result      No radiographic evidence of acute cardiopulmonary process.           Assessment/Plan  * Sepsis (HCC)- (present on admission)  Assessment & Plan  This is Sepsis Present on admission  SIRS criteria identified on my evaluation include: Fever, with temperature greater than 101 deg F, Tachycardia, with heart rate greater than 90 BPM and Tachypnea, with respirations greater than 20 per minute  Source is gram-positive cocci bacteremia  Suspected onset of infection (date and time) 5/23/21  Sepsis protocol initiated  Fluid resuscitation ordered per protocol  IV antibiotics as appropriate for source of sepsis  While organ dysfunction may be noted elsewhere in this problem list or in the chart, degree of organ dysfunction does not meet CMS criteria for severe  sepsis    Recently admitted to [facility] for similar complaints  Found to have group B strep bacteremia  Treated with antibiotics and discharged on 10-day course of amoxicillin  Echocardiogram performed at that admission unremarkable  Now presenting with persistent symptoms, also with murmur not previously documented on prior visit  Concern for possible vegetations  S/p LR 30 cc/kg, NS 1 L, Rocephin  -Continue with Rocephin  -Follow-up blood cultures and urinalysis.  Blood cultures grew strep agalactiae.  Repeat blood cultures negative.  -Tylenol for symptom management  Hold off on IV fluids.  Midodrine for blood pressure support.  Discussed with ID Dr. Velazquez.    Status post MATHEW on 5/26 which showed aortic vegetations with acute aortic regurgitation new heart failure  Cardiology, cardiothoracic surgery  I did speak with intensivist regarding hemodynamics and low blood pressure    Hypotension  Assessment & Plan  Secondary to septic shock  Maintenance fluids, fluid boluses, and midodrine.    Increased midodrine.  Patient now being diuresed  Patient does report dizziness/lightheadedness.  Treat sepsis as above   Trend lactic acid  Repeat echocardiogram pending.  Trend lactic acid.  Appears to be perfusing well on exam  He is high risk of decompensation and intensivist is following    Aortic valve endocarditis- (present on admission)  Assessment & Plan  Found on MATHEW 5/26.  Likely cause of hypotension  CT surgery is following with plan for surgery on Sunday    Sepsis due to group B Streptococcus with acute hypoxic respiratory failure (HCC)  Assessment & Plan  This is Severe Sepsis Present on admission  SIRS criteria identified on my evaluation include: Fever, with temperature greater than 101 deg F, Tachycardia, with heart rate greater than 90 BPM and Tachypnea, with respirations greater than 20 per minute  Source is bacteremia, endocarditis  Clinical indicators of end organ dysfunction include Systolic blood pressure  (SBP) <90 mmHg or mean arterial pressure <65 mmHg  Sepsis protocol initiated  Fluid resuscitation ordered per protocol  IV antibiotics as appropriate for source of sepsis  Reassessment: I have reassessed the patient's hemodynamic status  End organ dysfunction include(s):  Acute respiratory failure    Hypoxia respiratory failure in the setting of sepsis.  Concern for fluid overload.  Evaluate with chest x-ray, BNP.  Chest x-ray does show pulmonary edema.  Elevated BNP  Started on IV diuresis    Lactic acidosis- (present on admission)  Assessment & Plan  Lactic acid 2.1  -Trend lactic acids  -Continue with antibiotics  -Continue with IV fluids    Hyponatremia- (present on admission)  Assessment & Plan  Appears hypervolemic hyponatremia  Diuresis with furosemide  Trend with BMP    DM (diabetes mellitus) (HCC)- (present on admission)  Assessment & Plan  Lab Results   Component Value Date/Time    HBA1C 11.8 (H) 05/25/2021 1049    HBA1C 13.4 (H) 03/10/2020 0238    HBA1C 7.5 (H) 02/10/2015 1333     Results from last 7 days   Lab Units 05/27/21  0600 05/26/21  1955 05/26/21  1710 05/26/21  0601 05/25/21  2045 05/25/21  1726 05/25/21  1150 05/25/21  0635   ACCU CHECK GLUCOSE 788 mg/dL 185* 201* 231* 178* 184* 275* 204* 315*     I have ordered insulin sliding scale with D50 and glucagon for hypoglycemia per protocol.  Diabetic diet  Diabetic education       VTE prophylaxis: Enoxaparin

## 2021-05-27 NOTE — CONSULTS
REFERRING PHYSICIAN: Aan Rader DO.     CONSULTING PHYSICIAN: Alexis Aguiar DO.    CHIEF COMPLAINT: Fever, chills, malaise.    HISTORY OF PRESENT ILLNESS: The patient is a 51 y.o. male with history significant for diabetes, CAD s/p MI 1992, AIDS, brain tumor, lymphoma treated with chemotherapy and depression who presented to the ER 5/25/21 with complaints of fever/chills and malaise.   He was discharged from [facility] 15 days prior after presenting with similar symptoms.  He had positive blood cultures at that time with no source found.  Transthoracic echocardiogram was relatively benign with no vegetations seen. He was discharged on 10 days of Amoxicillin, which he completed. His fever/chills returned x 2days and he came back into ER.  No syncope or near syncope.   He was found to be septic and started on Ceftriaxone.  He underwent MATHEW which revealed aortic valve vegetations and EF of 35%.   proBNP is currently 12,347.  I was asked to consult. He complains of shortness of breath. He complains of occasional atypical chest pain. No other issues noted.     PAST MEDICAL HISTORY:   Past Medical History:   Diagnosis Date   • AIDS (acquired immune deficiency syndrome) (HCC)    • Brain tumor (HCC)    • CAD (coronary artery disease)    • Cancer (HCC)    • Depression    • Diabetes mellitus    • HIV (human immunodeficiency virus infection) (HCC)    • HIV (human immunodeficiency virus infection) (HCC)    • Lymphoma (HCC)    • Myocardial infarct (HCC)     1992       PAST SURGICAL HISTORY:   History reviewed. No pertinent surgical history.    FAMILY HISTORY:   History reviewed. No pertinent family history.     SOCIAL HISTORY:   Social History     Socioeconomic History   • Marital status: Single     Spouse name: Not on file   • Number of children: Not on file   • Years of education: Not on file   • Highest education level: Not on file   Occupational History   • Not on file   Tobacco Use   • Smoking status: Current Some  Day Smoker     Packs/day: 0.50     Types: Cigarettes     Last attempt to quit: 2015     Years since quittin.7   • Smokeless tobacco: Never Used   Vaping Use   • Vaping Use: Every day   Substance and Sexual Activity   • Alcohol use: No     Comment: socially   • Drug use: No     Types: Intravenous     Comment: hx of. currently clean   • Sexual activity: Not on file   Other Topics Concern   • Not on file   Social History Narrative   • Not on file     Social Determinants of Health     Financial Resource Strain:    • Difficulty of Paying Living Expenses:    Food Insecurity:    • Worried About Running Out of Food in the Last Year:    • Ran Out of Food in the Last Year:    Transportation Needs:    • Lack of Transportation (Medical):    • Lack of Transportation (Non-Medical):    Physical Activity:    • Days of Exercise per Week:    • Minutes of Exercise per Session:    Stress:    • Feeling of Stress :    Social Connections:    • Frequency of Communication with Friends and Family:    • Frequency of Social Gatherings with Friends and Family:    • Attends [employer] Services:    • Active Member of Clubs or Organizations:    • Attends Club or Organization Meetings:    • Marital Status:    Intimate Partner Violence:    • Fear of Current or Ex-Partner:    • Emotionally Abused:    • Physically Abused:    • Sexually Abused:        ALLERGIES:   Allergies   Allergen Reactions   • Peanut-Derived Anaphylaxis   • Penicillins Hives     Patient tolerated Ancef 08/13/15   • Hydrocodone-Acetaminophen Vomiting        CURRENT MEDICATIONS:     Current Facility-Administered Medications:   •  furosemide (LASIX) injection 20 mg, 20 mg, Intravenous, BID DIURETIC, Celestina De La Paz M.D., 20 mg at 21 0939  •  midodrine (PROAMATINE) tablet 10 mg, 10 mg, Oral, TID WITH MEALS, Sebas Merchant M.D., 10 mg at 21 1022  •  magnesium sulfate IVPB premix 2 g, 2 g, Intravenous, Once, Sebas Merchant M.D., Last Rate: 25 mL/hr at  05/27/21 1023, 2 g at 05/27/21 1023  •  Pharmacy Consult Request ...Pain Management Review 1 Each, 1 Each, Other, PHARMACY TO DOSE, Marco Rader D.O.  •  oxyCODONE immediate-release (ROXICODONE) tablet 2.5 mg, 2.5 mg, Oral, Q3HRS PRN, 2.5 mg at 05/26/21 0807 **OR** oxyCODONE immediate-release (ROXICODONE) tablet 5 mg, 5 mg, Oral, Q3HRS PRN, 5 mg at 05/27/21 0738 **OR** HYDROmorphone (Dilaudid) injection 0.25 mg, 0.25 mg, Intravenous, Q3HRS PRN, Marco Rader D.O.  •  Respiratory Therapy Consult, , Nebulization, Continuous RT, Marco Rader D.O.  •  senna-docusate (PERICOLACE or SENOKOT S) 8.6-50 MG per tablet 2 tablet, 2 tablet, Oral, BID, 2 tablet at 05/27/21 0558 **AND** polyethylene glycol/lytes (MIRALAX) PACKET 1 Packet, 1 Packet, Oral, QDAY PRN **AND** magnesium hydroxide (MILK OF MAGNESIA) suspension 30 mL, 30 mL, Oral, QDAY PRN **AND** bisacodyl (DULCOLAX) suppository 10 mg, 10 mg, Rectal, QDAY PRN, AGATHA Hill.D.  •  enoxaparin (LOVENOX) inj 40 mg, 40 mg, Subcutaneous, DAILY, Darnell Gillespie, M.D., 40 mg at 05/27/21 0558  •  acetaminophen (Tylenol) tablet 650 mg, 650 mg, Oral, Q6HRS PRN, BROCK HillDCarolina, 650 mg at 05/25/21 2034  •  cefTRIAXone (ROCEPHIN) 2 g in  mL IVPB, 2 g, Intravenous, Q24HRS, AGATHA Hill.DCarolina, Stopped at 05/27/21 0238  •  ondansetron (ZOFRAN) syringe/vial injection 4 mg, 4 mg, Intravenous, Q4HRS PRN, Darnell Gillespie M.D., 4 mg at 05/25/21 1652  •  ondansetron (ZOFRAN ODT) dispertab 4 mg, 4 mg, Oral, Q4HRS PRN, Darnell Gillespie M.D.  •  promethazine (PHENERGAN) tablet 12.5-25 mg, 12.5-25 mg, Oral, Q4HRS PRN, Darnell Gillespie M.D.  •  promethazine (PHENERGAN) suppository 12.5-25 mg, 12.5-25 mg, Rectal, Q4HRS PRN, Darnell Gillespie M.D.  •  prochlorperazine (COMPAZINE) injection 5-10 mg, 5-10 mg, Intravenous, Q4HRS PRN, Darnell Gillespie M.D.  •  insulin lispro (AdmeLOG) injection, 2-9 Units, Subcutaneous, 4X/DAY ACHS, 2 Units at 05/27/21 0558 **AND** POC blood glucose manual result, , , Q AC AND BEDTIME(S)  **AND** NOTIFY MD and PharmD, , , Once **AND** glucose 4 g chewable tablet 16 g, 16 g, Oral, Q15 MIN PRN **AND** dextrose 50% (D50W) injection 50 mL, 50 mL, Intravenous, Q15 MIN PRN, Darnell Gillespie M.D.  •  insulin glargine (Semglee) injection, 15 Units, Subcutaneous, Q EVENING, Darnell Gillespie M.D., 15 Units at 05/26/21 1757  •  bictegravir-emtricitab-TAF (Biktarvy) -25 mg tablet 1 tablet, 1 tablet, Oral, DAILY, AGATHA Hill.DCarolina, 1 tablet at 05/27/21 0558  •  atorvastatin (LIPITOR) tablet 40 mg, 40 mg, Oral, DAILY, BROCK HillDCarolina, 40 mg at 05/27/21 0558  •  sulfamethoxazole-trimethoprim (BACTRIM DS) 800-160 MG tablet 1 tablet, 1 tablet, Oral, DAILY, BROCK HillDCarolina, 1 tablet at 05/27/21 0558     LABS REVIEWED:  Lab Results   Component Value Date/Time    SODIUM 131 (L) 05/27/2021 12:25 AM    POTASSIUM 4.2 05/27/2021 12:25 AM    CHLORIDE 99 05/27/2021 12:25 AM    CO2 20 05/27/2021 12:25 AM    GLUCOSE 160 (H) 05/27/2021 12:25 AM    BUN 26 (H) 05/27/2021 12:25 AM    CREATININE 1.27 05/27/2021 12:25 AM    CREATININE 0.9 01/22/2008 09:30 AM      Lab Results   Component Value Date/Time    PROTHROMBTM 15.7 (H) 05/25/2021 04:05 AM    INR 1.21 (H) 05/25/2021 04:05 AM      Lab Results   Component Value Date/Time    WBC 7.3 05/27/2021 12:25 AM    RBC 4.67 (L) 05/27/2021 12:25 AM    HEMOGLOBIN 12.5 (L) 05/27/2021 12:25 AM    HEMATOCRIT 38.1 (L) 05/27/2021 12:25 AM    MCV 81.6 05/27/2021 12:25 AM    MCH 26.8 (L) 05/27/2021 12:25 AM    MCHC 32.8 (L) 05/27/2021 12:25 AM    RDW 40.4 05/27/2021 12:25 AM    PLATELETCT 180 05/27/2021 12:25 AM    MPV 12.2 05/27/2021 12:25 AM    NEUTSPOLYS 62.30 05/27/2021 12:25 AM    LYMPHOCYTES 23.30 05/27/2021 12:25 AM    MONOCYTES 10.80 05/27/2021 12:25 AM    EOSINOPHILS 2.90 05/27/2021 12:25 AM    BASOPHILS 0.30 05/27/2021 12:25 AM    ANISOCYTOSIS 1+ 03/10/2020 02:38 AM        IMAGING REVIEWED AND INTERPRETED:    ECHOCARDIOGRAM: 5/8/2021   CONCLUSIONS  Normal left ventricular size, wall thickness,  systolic, and diastolic   function.  Normal right ventricular size and systolic function.  Mild mitral regurgitation.  Mild tricuspid regurgitation.  Estimated right ventricular systolic pressure is 44 mmHg; mild   pulmonary hypertension.  No pericardial effusion seen.     No prior study is available for comparison.     TRANSESOPHAGEAL ECHOCARDIOGRAM 5/26/2021  CONCLUSIONS  Positive for aortic valve vegetation with flail leaflet of probably   non-coronary cusp and moderate aortic regurgitation.  Moderately reduced left ventricular systolic function.  Anteroseptal hypokinesis  Left ventricular ejection fraction is visually estimated to be 35%.    ANGIOGRAM: none available.     REVIEW OF SYSTEMS:   Review of Systems   Constitutional: Positive for chills, fever and malaise/fatigue.   HENT: Negative for ear pain, nosebleeds and tinnitus.    Eyes: Negative for double vision, photophobia and pain.   Respiratory: Positive for shortness of breath. Negative for cough and hemoptysis.    Cardiovascular: Positive for leg swelling. Negative for chest pain, palpitations, orthopnea and PND.   Gastrointestinal: Negative for abdominal pain, blood in stool, nausea and vomiting.   Genitourinary: Negative for frequency, hematuria and urgency.   Musculoskeletal: Positive for joint pain.   Skin: Negative for rash.   Neurological: Negative for dizziness, tremors, speech change, focal weakness, seizures and headaches.   Endo/Heme/Allergies: Negative for polydipsia. Does not bruise/bleed easily.   Psychiatric/Behavioral: Negative for hallucinations and memory loss.       PHYSICAL EXAMINATION:   Physical Exam  Vitals and nursing note reviewed.   Constitutional:       General: He is not in acute distress.     Appearance: He is ill-appearing.   HENT:      Head: Normocephalic and atraumatic.   Eyes:      Pupils: Pupils are equal, round, and reactive to light.   Neck:      Vascular: No JVD.   Cardiovascular:      Rate and Rhythm: Regular rhythm.  "Tachycardia present.      Heart sounds: Murmur heard.   No friction rub. No gallop.    Pulmonary:      Effort: Pulmonary effort is normal. No respiratory distress.      Breath sounds: Rales present. No wheezing.   Abdominal:      General: There is no distension.      Palpations: Abdomen is soft.      Tenderness: There is no abdominal tenderness. There is no rebound.   Musculoskeletal:         General: No tenderness or deformity.      Cervical back: Neck supple.   Skin:     General: Skin is warm and dry.      Findings: No erythema or rash.   Neurological:      Mental Status: He is alert and oriented to person, place, and time.      Gait: Gait is intact.   Psychiatric:         Mood and Affect: Affect normal.         Cognition and Memory: Memory normal.       CONSTITUTIONAL:  BP (!) 98/54   Pulse 99   Temp 36.9 °C (98.4 °F) (Temporal)   Resp (!) 24   Ht 1.854 m (6' 1\")   Wt 76.6 kg (168 lb 14 oz)   SpO2 94%         IMPRESSION:  Bacterial endocarditis, sepsis, acute systolic heart failure, diabetes,       PLAN:  I recommend Medical optimization and cardiac cath in preparation for valve replacement this hospital stay. I also recommend diuresis, as much he can tolerate prior to surgery. He will be scheduled in the next few days, as his decreased EF in the face of moderate regurgitation is very concerning.     The procedure, its risks, benefits, potential complications and alternative treatments were discussed with the patient in detail including the risks should he decide not to undergo my recommended treatment. All of his questions were answered to his satisfaction and he is willing to proceed with the operation. The risks include death, stroke,  infection: to include a rare bacterial infection related to the use of the heart/lung machine, kellen-operative myocardial infarction, dysrhythmias, diaphragmatic paralysis, chest wall paresthesia, tracheostomy, kidney or other organ failure, possible return to the operating " room for bleeding, bleeding requiring transfusion with its attendant risks including AIDS or hepatitis, dehiscence of surgical incisions, respiratory complications including the need for prolonged ventilator support, Protamine or other drug reaction, peripheral neuropathy, loss of limb, and miscount of surgical items. The operative mortality risk is approximately 5%. The STS mortality risk score is 3% and the morbidity and mortality risk score is 19%. The scores were discussed with patient.          Sincerely,       Alexis Aguiar DO.      Alexis ARTHUR DO performed a substantial portion of the EM visit face-to-face with the same patient on the same date of service with No Cantu PA-C. I was personally involved in reviewing and interpreting the films and conducted elements of the history and physical exam. I performed all of the medical decision making for the patient.

## 2021-05-27 NOTE — CARE PLAN
The patient is Watcher - Medium risk of patient condition declining or worsening      Progress made toward(s) clinical / shift goals:    Problem: Physical Regulation  Goal: Signs and symptoms of infection will decrease  Outcome: Progressing (Patient's fevers have subsided today. Continues to have SOB and soft BP's.      Problem: Pain - Standard  Goal: Alleviation of pain or a reduction in pain to the patient’s comfort goal  Outcome: Progressing       Patient is not progressing towards the following goals:    Problem: Respiratory  Goal: Patient will achieve/maintain optimum respiratory ventilation and gas exchange  Outcome: Not Progressing (Patient has been removing his O2 mask due to discomfort, then his O2 saturation will drop into the upper 80's. Education provided)

## 2021-05-27 NOTE — CONSULTS
51 y.o. male with history of AIDS, diabetes, CAD with MI, brain tumor who presented on 5/25/2021 with fever, found to have strep bacteremia secondary to aortic valve endocarditis based upon MATHEW 5/25.    Critical care consulted for possible cardiogenic shock from HFrEF + AR and need for higher level of care.    My evaluation demonstrates an ill-appearing male who is nontoxic and in no acute distress satting in the high 90s on oxygen mask 4 L breathing in the 20s.    He is warm and well-perfused, no significant lower extremity pitting edema.    Limited bedside ultrasound using the butterfly demonstrates an IVC of 1.8 cm with greater than 50% collapse, EF is reduced but the nature of the portable point-of-care ultrasound makes it difficult for an accurate assessment.    He was given midodrine and Lasix this morning, he has not made sufficient urine thereafter.  His repeat chest x-ray demonstrates bilateral infiltrates stable/improved from last night.    Clinically and by POCUS he does not appear significantly volume overloaded, NT proBNP may partly reflect his EVE, heart failure, sepsis.     I have ordered for him to have a Herman placed, repeat lactic acid, and repeat limited TTE for better assessment of his LVEF as his MATHEW images yesterday seem somewhat limited with regard to assessing LVEF.    In the interim I would suggest holding his Lasix until we get a better sense of his intravascular volume with a repeat formal echo.  It may be that his infiltrates on chest x-ray are noncardiogenic from his sepsis secondary to strep bacteremia.    Should his clinical status change and he need ICU status please reach out to the on-call intensivist without hesitation.    Sebas Merchant M.D.

## 2021-05-27 NOTE — PROGRESS NOTES
51 y.o. male with history of AIDS, diabetes, CAD with MI, who presented on 5/25/2021 with fever, found to have strep bacteremia secondary to aortic valve endocarditis based upon MATHEW 5/25.    Earlier in the day critical care was contacted - see prior note.     His symptoms persisted, LVEF 40% on repeat TTE, with a possible WMA, low UOP so he was moved to the ICU.     Upon arrival to the ICU he was awake and alert, oriented with a SBP in the 90s (MAP 60-65) on oxymask satting in the mid-90s. CPAP was started to help with pulmonary edema in hopes of decreasing afterload. He became anxious and did not tolerate this so it was removed and he was returned to Oxymask.     Arterial line prepped to be place for continual hemodynamic monitoring and he began complaining of nausea, vitals remained unchanged. Prior to the needle stick for the arterial line he became anxious then unresponsive. Repeat SBP 50s, levophed started peripherally, bicarb and calcium delivered; repeat assessment during these interventions revealed PEA. Code Blue called, BVM then Igel placement, epi delivered, bilateral I/Os placed. ACLS algorithm followed, see code sheet for details. At 10 minutes POCUS performed without cardiac activity. He remained in PEA for 23 minutes without a rhythm change, ETCO2 dropped from 19 to 10, repeat POCUS demonstrated complete cardiac standstill. He was pronounced dead at 15:18    A moment of silence was held, still attempting to contact family.    Critical care time = 36 minutes in directly providing and coordinating critical care and extensive data review.  No time overlap and excludes procedures.

## 2021-05-27 NOTE — PROGRESS NOTES
Patient now complaining of 8/10 chest pain. STAT EKG completed - sinus tachy. Echo currently being completed at bedside.

## 2021-05-27 NOTE — PROGRESS NOTES
Called to bedside by Onc RN and RICU RRT RN for increasing chest pain and discomfort by patient.  Repeat EKG ordered, no changes from previous EKG with noted ST depression in V4 and V5.  Dr Merchant updated as he was consulted on patient.  BP 89/45, .  Consulted by CT surgery with possible surgery planned per patient.  Dr Merchant placed ICU transfer orders.

## 2021-05-27 NOTE — CONSULTS
Cardiology Consultation Note      Date of service: 5/27/2021      Requesting Physician: Dr. Marco Rader      Reason for consultation: Aortic valve endocarditis with new aortic regurgitation and acute heart failure      History of present illness    The patient is 51 years old male with history of HIV and remote history of non-Hodgkin lymphoma who was hospitalized here's a few weeks ago for fever of unknown origin.  He is blood culture was positive 1/3 for group B streptococci. Echocardiography showed no evidence of valvular dysfunction or vegetation with NL LV systolic function.  Reportedly he was discharged home on amoxicillin he took it for about 10 days.  He initially improved but a few day after he completed his antibiotic the fever returned.  Still started to experience shortness of breath repeat blood culture again show positive strep group B 202 he stated his shortness of breath has been progressive.    Transesophageal echocardiography yesterday revealed new aortic regurgitation (probably moderate) with vegetation and probably fail noncoronary cusp along with moderately reduced left ventricle systolic function.     He has been started on ceftriaxone.  His saturation on room air is currently in the high 80s.  He reports shortness of breath at rest denies chest pain.  Blood pressure has been in the high 90s systolic with HR in the low 100.    Allergies   Allergen Reactions   • Peanut-Derived Anaphylaxis   • Penicillins Hives     Patient tolerated Ancef 08/13/15   • Hydrocodone-Acetaminophen Vomiting       @HOMEMEDS      Current Facility-Administered Medications:   •  Pharmacy Consult Request ...Pain Management Review 1 Each, 1 Each, Other, PHARMACY TO DOSE, Marco Rader D.O.  •  oxyCODONE immediate-release (ROXICODONE) tablet 2.5 mg, 2.5 mg, Oral, Q3HRS PRN, 2.5 mg at 05/26/21 0807 **OR** oxyCODONE immediate-release (ROXICODONE) tablet 5 mg, 5 mg, Oral, Q3HRS PRN, 5 mg at 05/27/21 0738 **OR**  HYDROmorphone (Dilaudid) injection 0.25 mg, 0.25 mg, Intravenous, Q3HRS PRN, Marco Rader D.O.  •  Respiratory Therapy Consult, , Nebulization, Continuous RT, Marco Rader D.O.  •  senna-docusate (PERICOLACE or SENOKOT S) 8.6-50 MG per tablet 2 tablet, 2 tablet, Oral, BID, 2 tablet at 05/27/21 0558 **AND** polyethylene glycol/lytes (MIRALAX) PACKET 1 Packet, 1 Packet, Oral, QDAY PRN **AND** magnesium hydroxide (MILK OF MAGNESIA) suspension 30 mL, 30 mL, Oral, QDAY PRN **AND** bisacodyl (DULCOLAX) suppository 10 mg, 10 mg, Rectal, QDAY PRN, Darnell Gillespie M.D.  •  enoxaparin (LOVENOX) inj 40 mg, 40 mg, Subcutaneous, DAILY, Darnell Gillespie M.D., 40 mg at 05/27/21 0558  •  acetaminophen (Tylenol) tablet 650 mg, 650 mg, Oral, Q6HRS PRN, BROCK HillDCarolina, 650 mg at 05/25/21 2034  •  cefTRIAXone (ROCEPHIN) 2 g in  mL IVPB, 2 g, Intravenous, Q24HRS, AGATHA Hill.DCarolina, Stopped at 05/27/21 0238  •  ondansetron (ZOFRAN) syringe/vial injection 4 mg, 4 mg, Intravenous, Q4HRS PRN, Darnell Gillespie M.DCarolina, 4 mg at 05/25/21 1652  •  ondansetron (ZOFRAN ODT) dispertab 4 mg, 4 mg, Oral, Q4HRS PRN, Darnell Gillespie M.CARYL.  •  promethazine (PHENERGAN) tablet 12.5-25 mg, 12.5-25 mg, Oral, Q4HRS PRN, Darnell Gillespie M.D.  •  promethazine (PHENERGAN) suppository 12.5-25 mg, 12.5-25 mg, Rectal, Q4HRS PRN, Darnell Gillespie M.D.  •  prochlorperazine (COMPAZINE) injection 5-10 mg, 5-10 mg, Intravenous, Q4HRS PRN, Darnell Gillespie M.D.  •  insulin lispro (AdmeLOG) injection, 2-9 Units, Subcutaneous, 4X/DAY ACHS, 2 Units at 05/27/21 0558 **AND** POC blood glucose manual result, , , Q AC AND BEDTIME(S) **AND** NOTIFY MD and PharmD, , , Once **AND** glucose 4 g chewable tablet 16 g, 16 g, Oral, Q15 MIN PRN **AND** dextrose 50% (D50W) injection 50 mL, 50 mL, Intravenous, Q15 MIN PRN, Darnell Gillespie M.D.  •  insulin glargine (Semglee) injection, 15 Units, Subcutaneous, Q EVENING, Darnell Gillespie M.D., 15 Units at 05/26/21 6129  •  bictegravir-emtricitab-TAF (Biktarvy) -25 mg  tablet 1 tablet, 1 tablet, Oral, DAILY, Darnell Gillespie M.D., 1 tablet at 21  •  atorvastatin (LIPITOR) tablet 40 mg, 40 mg, Oral, DAILY, Darnell Gillespie M.D., 40 mg at 21  •  sulfamethoxazole-trimethoprim (BACTRIM DS) 800-160 MG tablet 1 tablet, 1 tablet, Oral, DAILY, Darnell Gillespie M.D., 1 tablet at 21  •  midodrine (PROAMATINE) tablet 5 mg, 5 mg, Oral, TID WITH MEALS, Marco Rader D.O., 5 mg at 21 0738    Past Medical History:   Diagnosis Date   • AIDS (acquired immune deficiency syndrome) (HCC)    • Brain tumor (HCC)    • CAD (coronary artery disease)    • Cancer (HCC)    • Depression    • Diabetes mellitus    • HIV (human immunodeficiency virus infection) (HCC)    • HIV (human immunodeficiency virus infection) (HCC)    • Lymphoma (HCC)    • Myocardial infarct (HCC)            Past surgical history + brain tumor resection and lymphoma surgery left axilla    History reviewed. No pertinent family history.    Social History     Socioeconomic History   • Marital status: Single     Spouse name: Not on file   • Number of children: Not on file   • Years of education: Not on file   • Highest education level: Not on file   Occupational History   • Not on file   Tobacco Use   • Smoking status: Current Some Day Smoker     Packs/day: 0.50     Types: Cigarettes     Last attempt to quit: 2015     Years since quittin.7   • Smokeless tobacco: Never Used   Vaping Use   • Vaping Use: Every day   Substance and Sexual Activity   • Alcohol use: No     Comment: socially   • Drug use: No     Types: Intravenous     Comment: hx of. currently clean   • Sexual activity: Not on file   Other Topics Concern   • Not on file   Social History Narrative   • Not on file     Social Determinants of Health     Financial Resource Strain:    • Difficulty of Paying Living Expenses:    Food Insecurity:    • Worried About Running Out of Food in the Last Year:    • Ran Out of Food in the Last Year:     Transportation Needs:    • Lack of Transportation (Medical):    • Lack of Transportation (Non-Medical):    Physical Activity:    • Days of Exercise per Week:    • Minutes of Exercise per Session:    Stress:    • Feeling of Stress :    Social Connections:    • Frequency of Communication with Friends and Family:    • Frequency of Social Gatherings with Friends and Family:    • Attends [employer] Services:    • Active Member of Clubs or Organizations:    • Attends Club or Organization Meetings:    • Marital Status:    Intimate Partner Violence:    • Fear of Current or Ex-Partner:    • Emotionally Abused:    • Physically Abused:    • Sexually Abused:          Review of systems;    General: + fever and chills, + weakness or fatigue  HENT: No discharge, no ringing in the ears, no toothache or sore throat, no neck pain  Eyes: No redness, no blurred vision or double vision  Heart: No palpitation, + PND or orthopnea, no claudication, no leg swelling  Lung: + productive cough, no hemoptysis  Abdomen: No abdominal pain, no nausea vomiting or diarrhea, no blood in stool  : No dysuria, no frequency or hematuria  Musculoskeletal: No myalgia, no back pain, some joint pain  Hematology: No easy bruising  Skin: No rash or itching  Neurological: No headache, no new focal weakness or numbness  Psychological: Denies depression, anxiety or insomnia  All other review of systems are negative    Vitals:    05/26/21 2338 05/27/21 0440 05/27/21 0738 05/27/21 0800   BP: (!) 94/51 (!) 98/50  (!) 96/55   Pulse: (!) 113 100  (!) 107   Resp: 20 19  20   Temp: 36.8 °C (98.2 °F) 36.8 °C (98.2 °F)  37.2 °C (99 °F)   TempSrc:    Temporal   SpO2: 94% 91% 93% 90%   Weight:       Height:         GENERAL not in acute distress, not dyspnic at rest  Head atraumatic, normocephalic  Eyes EOMI  ENT neck supple, no JVD, no carotid bruits or thyromegaly  Lung good expansion, + basilar rales  Heart RRR, lightly tachycardic, with musical  high-pitched  2/6systolic murmur normal rate, no murmur, gallop or rub  Abd soft, no tenderness, mass or bruits  Ext no edema  Skin no ecchymosis or petechiae  Musculoskeletal no deformity  Neuro grossly intact  Psych normal mood, normal affect    Labs WBC 7K.  NT-pro BNP 87146    Results for VANESSA ARCHIBALD (MRN [id number]) as of 5/27/2021 09:21   Ref. Range 5/27/2021 00:25   Sodium Latest Ref Range: 135 - 145 mmol/L 131 (L)   Potassium Latest Ref Range: 3.6 - 5.5 mmol/L 4.2   Chloride Latest Ref Range: 96 - 112 mmol/L 99   Co2 Latest Ref Range: 20 - 33 mmol/L 20   Anion Gap Latest Ref Range: 7.0 - 16.0  12.0   Glucose Latest Ref Range: 65 - 99 mg/dL 160 (H)   Bun Latest Ref Range: 8 - 22 mg/dL 26 (H)   Creatinine Latest Ref Range: 0.50 - 1.40 mg/dL 1.27   GFR If  Latest Ref Range: >60 mL/min/1.73 m 2 >60   GFR If Non  Latest Ref Range: >60 mL/min/1.73 m 2 59 (A)   Calcium Latest Ref Range: 8.5 - 10.5 mg/dL 8.4 (L)   AST(SGOT) Latest Ref Range: 12 - 45 U/L 40   ALT(SGPT) Latest Ref Range: 2 - 50 U/L 49   Alkaline Phosphatase Latest Ref Range: 30 - 99 U/L 283 (H)   Total Bilirubin Latest Ref Range: 0.1 - 1.5 mg/dL 1.3   Albumin Latest Ref Range: 3.2 - 4.9 g/dL 3.1 (L)   Total Protein Latest Ref Range: 6.0 - 8.2 g/dL 7.1   Globulin Latest Ref Range: 1.9 - 3.5 g/dL 4.0 (H)   A-G Ratio Latest Units: g/dL 0.8   Phosphorus Latest Ref Range: 2.5 - 4.5 mg/dL 2.9   Magnesium Latest Ref Range: 1.5 - 2.5 mg/dL 1.8   Lactic Acid Latest Ref Range: 0.5 - 2.0 mmol/L 2.1 (H)   Osmolality Serum Latest Ref Range: 278 - 298 mOsm/kg H2O 281     EKG yesterday by my review showed sinus tachycardia with poor R wave progression otherwise unremarkable    Assessment and plans    1. 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  He has pulmonary congestion requiring diuresis but his blood pressure is borderline low.  He may have it from  dobutamine and will need close monitoring.  Agree with infectious disease consult.  I am concerned that he may require high aortic valve replacement and would obtain CT surgery consultation to have them follow along.    2.  New systolic dysfunction  ACEI if BP allows. Low dose carvedilol when more stable    3. History of HIV  Per primary team    Will follow the patient along with you.  Thank you consultation.    Please note that this dictation was created using voice recognition software. I have worked with consultants from the vendor as well as technical experts from [employer] to optimize the interface. I have made every reasonable attempt to correct obvious errors, but I expect that there are errors of grammar and possibly content I did not discover before finalizing the note

## 2021-05-27 NOTE — ASSESSMENT & PLAN NOTE
Found on MATHEW 5/26.  Likely cause of hypotension  CT surgery is following with plan for surgery on Sunday

## 2021-05-28 NOTE — PROGRESS NOTES
Spiritual Care Note    Patient Information     Patient's Name: Vincent Riojas   MRN: [id number]    YOB: 1970   Age and Gender: 51 y.o. male   Service Area: CARDIAC ICU [facility]   Room [facility]and Bed[facility] [place]   Ethnicity or Nationality:     Primary Language: English   Jainism/Spiritual preference: Evangelical   Place of Residence: [place]   Family/Friends/Others Present: no   Clinical Team Present: Code Blue Team   Medical Diagnosis(-es)/Procedure(s): Sepsis   Code Status: Full Code    Date of Admission: 5/25/2021   Length of Stay: 2 days        Spiritual Care Provider Information:  Name of Spiritual Care Provider: Verito Dent  Title of Spiritual Care Provider: Associate   Phone Number: [phone]  E-mail: [email or web address]  Total time : 30 minutes    Spiritual Screen Results:    Encounter/Request Information  Encounter/Request Type   Visited With: Patient, Health care provider  Nature of the Visit: Initial, On shift  Continue Visiting: No  Crisis Visit: Patient actively dying/EOL, Death  Referral From/ Origin of Request: Verbal staff (CODE BLUE CALLED)    Religous Needs/Values  Ritual Needs Visit    Ritual Needs: EOL ritual    Spiritual Assessment   Spiritual Care Encounters    Observations/Symptoms: Other (Comment) (Pt in active resuscitation efforts upon  arrival, no family present)    Interacton/Conversation:  responded to code blue situation.   checked in with RN, JEREMÍAS, currently unable to locate family.  Unfortunately pt unable to be resuscitated -  checked pt's religous designation, confirmed it was Evangelical.   prayed traditional Evangelical prayers for the dcsd.    Assessment: Need    Need: Seeking Spiritual Assistance and Support (for dcsd pt)    Intended Effects: Barbara Affirmation    Interventions: EOL Ritual    Outcomes: Value/Dignity/Respect    Plan: No Further Visits    Notes:

## 2021-05-28 NOTE — PROCEDURES
Arterial Line Insertion    Date/Time: 5/27/2021 2:55 PM  Performed by: Sebas Merchant M.D.  Authorized by: Sebas Merchant M.D.   Preparation: Patient was prepped and draped in the usual sterile fashion.  Indications: hemodynamic monitoring  Location: right radial  Anesthesia: local infiltration    Anesthesia:  Local Anesthetic: lidocaine 1% without epinephrine    Sedation:  Patient sedated: no    Bryan's test normal: yes  Needle gauge: 20  Seldinger technique: Seldinger technique used  Number of attempts: 1  Post-procedure: line sutured and dressing applied  Post-procedure CMS: normal  Patient tolerance: patient tolerated the procedure well with no immediate complications  Comments: Resident physician, Lulu, set up to do the arterial line with a sterile drape when the patient began feeling unwell - I had the nurse repeat the blood pressure with a SBP in the 50s. I quickly assumed responsibility for the arterial line to expedite it while adding NE, CaCl, and bicarbonate. The arterial line went in quickly without issue during his kellen-arrest period.

## 2021-05-29 NOTE — DOCUMENTATION QUERY
[facility]                                                                       Query Response Note      PATIENT:               VANESSA ARCHIBALD  ACCT #:                  [id number]  MRN:                     [id number]  :                      1970  ADMIT DATE:       2021 1:24 AM  DISCH DATE:        2021 3:18 PM  RESPONDING  PROVIDER #:        [id number]           QUERY TEXT:    Sepsis and AIDS (Acquired Immune Deficiency Syndrome) are documented in the Medical Record. Please clarify the relationship, if any, between sepsis and AIDS (acquired immune deficiency syndrome).     NOTE:  If an appropriate response is not listed below, please respond with a new note.     The patient's Clinical Indicators include:  Per D/ C Summary   -Cardiac arrest due to cardiogenic shock due to aortic regurgitation due to infective endocarditis  -Contributing conditions: AIDS, DM, meth abuse  -Sepsis due to group B Streptococcus with acute hypoxic respiratory failure   -Recently admitted to [facility] for fever 15 days prior and found to have Strep agalactiae bacteremia   -in speaking with the coordinator for appointments at the [facility] he never filled his prescriptions    Prior Labs 21   Cd4-Cd8 ratio:0.11  Cd3 Ct: 982  Cd4-T4 helper cells: 96  Cd8 -T8 suppressor cells: 841    Treatment:   Transfer to ICU, IV NS 1000ml bolus X 3 & continuous @ 100ml/hr, Rocephin, Vanco, Bactrim, Levophed, & Biktarvy    Risk Factors:   AIDS, sepsis due to group B Streptococcus, acute respiratory failure, septic shock, aortic valve endocarditis, acute systolic heart failure, cardiogenic shock, DM with hyperglycemia, & lactic acidosis    Thank You,  Irasema Ram RN BSN  Clinical   Connect via [employer]  Options provided:   -- Sepsis is due to or associated with AIDS (Acquired Immune Deficiency Syndrome)   -- Unrelated to each other   --  Unable to determine      Query created by: Irasema Ram on 5/28/2021 7:33 AM    RESPONSE TEXT:    Sepsis is due to or associated with AIDS (Acquired Immune Deficiency Syndrome)       QUERY TEXT:    Infiltrates on chest x-ray has been documented in the Critical Care Consultation. Can this condition be further clarified?     NOTE:  If an appropriate response is not listed below, please respond with a new note.    The patient's Clinical Indicators include:  Per Critical Care Consultation  -Clinically and by POCUS he does not appear significantly volume overloaded,   -NT proBNP may partly reflect his EVE, heart failure, sepsis   -His repeat chest x-ray demonstrates bilateral infiltrates stable/improved from last night  -It may be that his infiltrates on chest x-ray are noncardiogenic from his sepsis secondary to strep bacteremia.     Results Review:   WBC 4.5-7.0  Procalcitonin 0.27, 0.25  NT-proBNP 90038  Troponin T 58  CXR 5/25:  No radiographic evidence of acute cardiopulmonary process.  CXR 5/26:  New extensive airspace opacity throughout both lungs could relate to developing atypical infection or moderate pulmonary edema.  CXR 5/27:  1. Poorly defined pulmonary opacifications in each lung have decreased somewhat compared to the prior exam which could indicate resolving pulmonary edema or pneumonia. 2. No new infiltrates or consolidations are identified.  Echo 5/27: Estimated EF 40%, LAD regional wall abnormality    Treatment:   Transfer to ICU, IV NS 1000ml bolus X 3 & continuous @ 100ml/hr, IV Lasix, repeat echocardiogram, serial CXR, NT-proBNP, procalcitonin, Rocephin, Vanco, Bactrim, Levophed, & Biktarvy    Risk Factors:   AIDS, sepsis due to group B Streptococcus, acute respiratory failure, septic shock, acute kidney injury, aortic valve endocarditis, acute systolic heart failure, cardiogenic shock, DM with hyperglycemia, & lactic acidosis    Thank You,  Irasema Ram RN BSN  Clinical Documentation  Specialist  Connect via [employer]  Options provided:   -- Suspected/Likely Pneumonia is present   -- Cardiogenic pulmonary edema is present   -- Unable to determine      Query created by: Irasema Ram on 5/28/2021 7:57 AM    RESPONSE TEXT:    Unable to determine          Electronically signed by:  MARION SOTO MD 5/29/2021 7:55 AM

## 2021-05-31 LAB
BACTERIA BLD CULT: NORMAL
BACTERIA BLD CULT: NORMAL
SIGNIFICANT IND 70042: NORMAL
SIGNIFICANT IND 70042: NORMAL
SITE SITE: NORMAL
SITE SITE: NORMAL
SOURCE SOURCE: NORMAL
SOURCE SOURCE: NORMAL

## 2021-06-20 LAB
MYCOBACTERIUM BLD CULT: NORMAL
SIGNIFICANT IND 70042: NORMAL
SITE SITE: NORMAL
SOURCE SOURCE: NORMAL

## 2021-09-08 NOTE — LETTER
[facility] [address] [address]  Phone:  [phone] - Fax:  [phone]   [facility] Progress Report and Disability Certification  Date of Service: 2020   No Show:  No  Date / Time of Next Visit: 1/10/2020   Claim Information   Patient Name: Vincent Riojas  Claim Number:     Employer: [employer]  Date of Injury: 2020     Insurer / TPA: [employer]  ID / SSN:     Occupation: Associate  Diagnosis: The encounter diagnosis was Strain of calf muscle, right, initial encounter.    Medical Information   Related to Industrial Injury? Yes    Subjective Complaints:  DOI 2020: patient states was was pushing heavy carts at work and when he went to push off, he felt a pull in his right calf muscle. Denies falling. States he went to see on site MD and was given ace wrap and told to take ibuprofen. States with mild to moderate calf pain, worsened with walking/bearing weight. States with improvement today. Denies prior injury to the area. Has been using cane to aid with walking.   Objective Findings: Right calf: TTP to the medial right calf. No bruising, erythema or swelling present. Negative Dixon's sign. Distal neurovascular and sensation intact.   Right calf and left calf equal measurements   Pre-Existing Condition(s): None   Assessment:   Initial Visit    Status: Additional Care Required  Permanent Disability:No    Plan:      Diagnostics:      Comments:       Disability Information   Status: Released to Restricted Duty    From:  2020  Through: 1/10/2020 Restrictions are: Temporary   Physical Restrictions   Sitting:    Standing:  < or = to 1 hr/day Stooping:  < or = to 1 hr/day Bending:      Squatting:  < or = to 1 hr/day Walking:  < or = to 1 hr/day Climbin hrs/day Pushing:      Pulling:    Other:    Reaching Above Shoulder (L):   Reaching Above Shoulder (R):       Reaching Below Shoulder (L):    Reaching Below Shoulder  Form filled out and faxed, [place] aware (R):      Not to exceed Weight Limits   Carrying(hrs):   Weight Limit(lb):   Lifting(hrs):   Weight  Limit(lb):     Comments: Continue with ace wrap and May take over-the-counter Ibuprofen 600-800 mg every 8 hours as needed for pain. Return to office for worsening symptoms.      Repetitive Actions   Hands: i.e. Fine Manipulations from Grasping:     Feet: i.e. Operating Foot Controls:     Driving / Operate Machinery:     Physician Name: MARY BETH Reed Physician Signature: AMANDA Gibson e-Signature: Dr. Sukh Putnam, Medical Director   Clinic Name / Location: [address] [address] Clinic Phone Number: Dept: [phone]   Appointment Time: 5:40 Pm Visit Start Time: 6:37 PM   Check-In Time:  5:55 Pm Visit Discharge Time:  7:15 PM   Original-Treating Physician or Chiropractor    Page 2-Insurer/TPA    Page 3-Employer    Page 4-Employee